# Patient Record
Sex: FEMALE | Race: BLACK OR AFRICAN AMERICAN | NOT HISPANIC OR LATINO | Employment: UNEMPLOYED | URBAN - METROPOLITAN AREA
[De-identification: names, ages, dates, MRNs, and addresses within clinical notes are randomized per-mention and may not be internally consistent; named-entity substitution may affect disease eponyms.]

---

## 2017-02-03 ENCOUNTER — TELEPHONE (OUTPATIENT)
Dept: OBSTETRICS AND GYNECOLOGY | Facility: CLINIC | Age: 27
End: 2017-02-03

## 2017-02-20 ENCOUNTER — TELEPHONE (OUTPATIENT)
Dept: OBSTETRICS AND GYNECOLOGY | Facility: CLINIC | Age: 27
End: 2017-02-20

## 2017-02-20 DIAGNOSIS — N91.2 AMENORRHEA: Primary | ICD-10-CM

## 2017-03-02 ENCOUNTER — TELEPHONE (OUTPATIENT)
Dept: OBSTETRICS AND GYNECOLOGY | Facility: CLINIC | Age: 27
End: 2017-03-02

## 2017-03-02 NOTE — TELEPHONE ENCOUNTER
----- Message from Mili Soto sent at 3/2/2017 12:17 PM CST -----  Contact: PAOLA HAWLEY [[id number]]  _x  1st Request  _  2nd Request  _  3rd Request        Who: PAOLA HAWLEY [[id number]]    Why: Pt states she would like to schedule her initial OB appointment. Pt states her LMP was around 12/25/16    What Number to Call Back: [phone]    When to Expect a call back: (Before the end of the day)   -- if call after 3:00 call back will be tomorrow.

## 2017-03-03 ENCOUNTER — TELEPHONE (OUTPATIENT)
Dept: OBSTETRICS AND GYNECOLOGY | Facility: CLINIC | Age: 27
End: 2017-03-03

## 2017-03-03 NOTE — TELEPHONE ENCOUNTER
She is already scheduled to see Linsey Silverman on 3/8. She was trying to get a sooner appt. She's going to keep her appt with Linsey Silverman.

## 2017-03-03 NOTE — TELEPHONE ENCOUNTER
----- Message from Mili Valera sent at 3/3/2017 12:47 PM CST -----  Contact: PAOLA HAWLEY [[id number]]  _x  1st Request  _  2nd Request  _  3rd Request        Who: PAOLA HAWLEY [[id number]]    Why: patient is returning a call    What Number to Call Back: [phone]    When to Expect a call back: (Before the end of the day)   -- if call after 3:00 call back will be tomorrow.

## 2017-03-08 ENCOUNTER — OFFICE VISIT (OUTPATIENT)
Dept: OBSTETRICS AND GYNECOLOGY | Facility: CLINIC | Age: 27
End: 2017-03-08
Payer: MEDICAID

## 2017-03-08 ENCOUNTER — HOSPITAL ENCOUNTER (EMERGENCY)
Facility: OTHER | Age: 27
Discharge: HOME | End: 2017-03-08
Attending: OBSTETRICS & GYNECOLOGY
Payer: MEDICAID

## 2017-03-08 VITALS
SYSTOLIC BLOOD PRESSURE: 120 MMHG | OXYGEN SATURATION: 97 % | TEMPERATURE: 99 F | DIASTOLIC BLOOD PRESSURE: 76 MMHG | HEART RATE: 92 BPM

## 2017-03-08 VITALS
SYSTOLIC BLOOD PRESSURE: 122 MMHG | HEIGHT: 62 IN | WEIGHT: 224.63 LBS | DIASTOLIC BLOOD PRESSURE: 78 MMHG | BODY MASS INDEX: 41.34 KG/M2

## 2017-03-08 DIAGNOSIS — R10.9 ABDOMINAL CRAMPING AFFECTING PREGNANCY, ANTEPARTUM: Primary | ICD-10-CM

## 2017-03-08 DIAGNOSIS — Z3A.18 18 WEEKS GESTATION OF PREGNANCY: ICD-10-CM

## 2017-03-08 DIAGNOSIS — N91.2 AMENORRHEA: Primary | ICD-10-CM

## 2017-03-08 DIAGNOSIS — M54.32 SCIATICA OF LEFT SIDE: ICD-10-CM

## 2017-03-08 DIAGNOSIS — O26.899 ABDOMINAL CRAMPING AFFECTING PREGNANCY, ANTEPARTUM: Primary | ICD-10-CM

## 2017-03-08 DIAGNOSIS — Z32.01 POSITIVE PREGNANCY TEST: ICD-10-CM

## 2017-03-08 DIAGNOSIS — O16.2 ELEVATED BLOOD PRESSURE COMPLICATING PREGNANCY, ANTEPARTUM, SECOND TRIMESTER: ICD-10-CM

## 2017-03-08 LAB
ALBUMIN SERPL BCP-MCNC: 3.1 G/DL
ALP SERPL-CCNC: 63 U/L
ALT SERPL W/O P-5'-P-CCNC: 8 U/L
ANION GAP SERPL CALC-SCNC: 8 MMOL/L
AST SERPL-CCNC: 10 U/L
B-HCG UR QL: POSITIVE
BACTERIA #/AREA URNS HPF: ABNORMAL /HPF
BILIRUB SERPL-MCNC: 0.2 MG/DL
BILIRUB SERPL-MCNC: NORMAL MG/DL
BILIRUB UR QL STRIP: NEGATIVE
BLOOD URINE, POC: NORMAL
BUN SERPL-MCNC: 6 MG/DL
CALCIUM SERPL-MCNC: 9.5 MG/DL
CHLORIDE SERPL-SCNC: 109 MMOL/L
CLARITY UR: CLEAR
CO2 SERPL-SCNC: 24 MMOL/L
COLOR UR: YELLOW
COLOR, POC UA: NORMAL
CREAT SERPL-MCNC: 0.6 MG/DL
CREAT UR-MCNC: 90.3 MG/DL
CTP QC/QA: YES
EST. GFR  (AFRICAN AMERICAN): >60 ML/MIN/1.73 M^2
EST. GFR  (NON AFRICAN AMERICAN): >60 ML/MIN/1.73 M^2
GLUCOSE SERPL-MCNC: 87 MG/DL
GLUCOSE UR QL STRIP: NEGATIVE
GLUCOSE UR QL STRIP: NORMAL
HGB UR QL STRIP: ABNORMAL
KETONES UR QL STRIP: NEGATIVE
KETONES UR QL STRIP: NORMAL
LDH SERPL L TO P-CCNC: 196 U/L
LEUKOCYTE ESTERASE UR QL STRIP: NEGATIVE
LEUKOCYTE ESTERASE URINE, POC: NORMAL
MICROSCOPIC COMMENT: ABNORMAL
NITRITE UR QL STRIP: NEGATIVE
NITRITE, POC UA: NORMAL
PH UR STRIP: 7 [PH] (ref 5–8)
PH, POC UA: NORMAL
POTASSIUM SERPL-SCNC: 3.4 MMOL/L
PROT SERPL-MCNC: 6.6 G/DL
PROT UR QL STRIP: NEGATIVE
PROT UR-MCNC: 12 MG/DL
PROT/CREAT RATIO, UR: 0.13
PROTEIN, POC: NORMAL
RBC #/AREA URNS HPF: 12 /HPF (ref 0–4)
SODIUM SERPL-SCNC: 141 MMOL/L
SP GR UR STRIP: 1.02 (ref 1–1.03)
SPECIFIC GRAVITY, POC UA: NORMAL
SQUAMOUS #/AREA URNS HPF: 5 /HPF
URN SPEC COLLECT METH UR: ABNORMAL
UROBILINOGEN UR STRIP-ACNC: 1 EU/DL
UROBILINOGEN, POC UA: NORMAL

## 2017-03-08 PROCEDURE — 81000 URINALYSIS NONAUTO W/SCOPE: CPT

## 2017-03-08 PROCEDURE — 80053 COMPREHEN METABOLIC PANEL: CPT

## 2017-03-08 PROCEDURE — 99283 EMERGENCY DEPT VISIT LOW MDM: CPT | Mod: ,,, | Performed by: OBSTETRICS & GYNECOLOGY

## 2017-03-08 PROCEDURE — 99284 EMERGENCY DEPT VISIT MOD MDM: CPT | Mod: 25,27

## 2017-03-08 PROCEDURE — 81002 URINALYSIS NONAUTO W/O SCOPE: CPT

## 2017-03-08 PROCEDURE — 99203 OFFICE O/P NEW LOW 30 MIN: CPT | Mod: S$PBB,TH,, | Performed by: NURSE PRACTITIONER

## 2017-03-08 PROCEDURE — 83615 LACTATE (LD) (LDH) ENZYME: CPT

## 2017-03-08 PROCEDURE — 84156 ASSAY OF PROTEIN URINE: CPT

## 2017-03-08 PROCEDURE — 99999 PR PBB SHADOW E&M-EST. PATIENT-LVL II: CPT | Mod: PBBFAC,,, | Performed by: NURSE PRACTITIONER

## 2017-03-08 NOTE — ED AVS SNAPSHOT
[facility]  [address]  [facility] [address]               Macie Guzman   3/8/2017  9:29 PM   ED    Description:  Female : 1990   Department:  [facility]           Your Care was Coordinated By:     Provider Role From To    Cara Platt DO Attending Provider [phone] --      Reason for Visit     Abdominal Pain     Back Pain           Diagnoses this Visit        Comments    Abdominal cramping affecting pregnancy, antepartum    -  Primary     Elevated blood pressure complicating pregnancy, antepartum, second trimester         18 weeks gestation of pregnancy           ED Disposition     ED Disposition Condition Comment    Discharge             To Do List           Follow-up Information     Follow up In 1 week.    Why:  primary OB - as scheduled      [facility]     [facility] Care [facility] -  Assistance  Registered nurses in the [facility] provide clinical advisement, health education, appointment booking, and other advisory services.  Call for this free service at [phone].             Medications           Message regarding Medications     Verify the changes and/or additions to your medication regime listed below are the same as discussed with your clinician today.  If any of these changes or additions are incorrect, please notify your healthcare provider.             Verify that the below list of medications is an accurate representation of the medications you are currently taking.  If none reported, the list may be blank. If incorrect, please contact your healthcare provider. Carry this list with you in case of emergency.           Current Medications            Clinical Reference Information           Your Vitals Were     BP Pulse Temp Last Period SpO2       120/76 92 98.8 °F (37.1 °C) 2016 (Approximate) 97%       Allergies as of 3/8/2017     No Known Allergies      Immunizations Administered on  Date of Encounter - 3/8/2017     None      ED Micro, Lab, POCT     Start Ordered       Status Ordering Provider    03/08/17 2217 03/08/17 2216  Urinalysis Clean Catch  STAT      Final result     03/08/17 2216 03/08/17 2216  Urinalysis Microscopic  Once      Final result     03/08/17 2154 03/08/17 2153  Lactate dehydrogenase  STAT      Final result     03/08/17 2154 03/08/17 2153  Protein / creatinine ratio, urine  Once      Final result     03/08/17 2154 03/08/17 2153  Comprehensive metabolic panel  STAT      Final result     03/08/17 2154 03/08/17 2154  POCT urinalysis, dipstick or tablet reag  Once      Final result       ED Imaging Orders     None        Discharge Instructions       Call clinic [phone] or L & D after hours at [phone] for vaginal bleeding, leakage of fluids, contractions 4-5 in one hour, decreased fetal movements ( 10 kicks in 2 hours), headache not relieved by Tylenol, blurry vision, or temp of 100.4 or greater.  Begin doing fetal kick counts, at least 10 movements in 2 hours starting at 28 weeks gestation.  Keep next clinic appointment      Your Scheduled Appointments     Apr 05, 2017  9:15 AM CDT   Routine Prenatal Visit with Brea Celestin MD   [facility] [address] [facility]    [address] [place] [id number] [phone]               [facility] complies with applicable Federal civil rights laws and does not discriminate on the basis of race, color, national origin, age, disability, or sex.        Language Assistance Services     ATTENTION: Language assistance services are available, free of charge. Please call [phone].      ATENCIÓN: Si habla español, tiene a mayo disposición servicios gratuitos de asistencia lingüística. Llame al [phone].     CHÚ Ý: N?u b?n nói Ti?ng Vi?t, có các d?ch v? h? tr? ngôn ng? mi?n phí dành cho b?n. G?i s? [phone].

## 2017-03-08 NOTE — PROGRESS NOTES
"  CC: Positive Pregnancy Test    HISTORY OF PRESENT ILLNESS:    Macie Guzman is a 26 y.o. female, ,  Presents today for a routine exam complaining of amenorrhea and positive home urine pregnancy test.  Patient's last menstrual period was 2016 (approximate).  Pt states she had the IUP confirmed at a women's clinic in [facility] with a NP that works with Dr. Paredes. Pt states they did an ultrasound, but told her the baby had "no organs". Pt states she was never given an ZAINAB or actually saw the doctor. Reports they did blood work and a pap which was normal. Pt is going to request her records from them. Transferring to [facility]-has seen Dr. Thurman in the past.  Reports + fetal movement. Denies pertinent medical history. Hx  x 2 ( and ) @ term with no complications per patient report.     Denies nausea. Reports breast tenderness. Denies pelvic pain. No vaginal bleeding.     LMP: 2016  EGA: 18w1d  EDC: 2017    ROS:  GENERAL: No weight changes. No swelling. No fatigue. No fever.  CARDIOVASCULAR: No chest pain. No shortness of breath. No leg cramps.   NEUROLOGICAL: No headaches. No vision changes.  BREASTS: No pain. No lumps. No discharge.  ABDOMEN: No pain. No diarrhea. No constipation.  REPRODUCTIVE: No abnormal bleeding.   VULVA: No pain. No lesions. No itching.  VAGINA: No relaxation. No itching. No odor. No discharge. No lesions.  URINARY: No incontinence. No nocturia. No frequency. No dysuria.    MEDICATIONS AND ALLERGIES:  Reviewed    COMPREHENSIVE GYN HISTORY:  PAP History: Denies abnormal Paps.  Infection History: Denies STDs. Denies PID.  Benign History: Denies uterine fibroids. Denies ovarian cysts. Denies endometriosis. Denies other conditions.  Cancer History: Denies cervical cancer. Denies uterine cancer or hyperplasia. Denies ovarian cancer. Denies vulvar cancer or pre-cancer. Denies vaginal cancer or pre-cancer. Denies breast cancer. Denies colon cancer.  Sexual Activity " "History: Reports currently being sexually active  Menstrual History: None.  Contraception: None    /78  Ht 5' 2" (1.575 m)  Wt 101.9 kg (224 lb 10.4 oz)  LMP 2016 (Approximate)  BMI 41.09 kg/m2    PE:  AFFECT: Calm, alert and oriented X 3. Interactive during exam  GENERAL: Appears well-nourished, well-developed, in no acute distress.  HEAD: Normocephalic, atruamatic  TEETH: Good dentition.  THYROID: No thyromegally   BREASTS: No masses, skin changes, nipple discharge or adenopathy bilaterally.  SKIN: Normal for race, warm, & dry. No lesions or rashes.  LUNGS: Easy and unlabored, clear to auscultation bilaterally.  HEART: Regular rate and rhythm   ABDOMEN: Soft and nontender without masses or organomegally.  VULVA: No lesions, masses or tenderness.  VAGINA: Moist and well rugated without lesions or discharge.  CERVIX: Moist and pink without lesions, discharge or tenderness.      UTERUS SIZE: 18 week size, nontender and without masses.  ADNEXA: No masses or tenderness.  ESTIMATE OF PELVIC CAPACITY: Adequate  EXTREMITIES: No cyanosis, clubbing or edema. No calf tenderness.  LYMPH NODES: No axillary or inguinal adenopathy.    PROCEDURES:  UPT Positive  Genprobe  Pap current    ASSESSMENT/PLAN:  Amenorrhea  Positive urine pregnancy test (ZAINAB: 2017, EGA: 18w1d based on LMP)    -  Routine prenatal care    Nausea and vomiting in pregnancy    -  Education regarding lifestyle and dietary modifications    -  Advised use of B6/Unisom. Pt will notify us if no relief/worsening symptoms, will consider Zofran if needed.    1st TRIMESTER COUNSELING: Discussed all, booklet provided:  Common complaints of pregnancy  HIV and other routine prenatal tests including  genetic screening  Risk factors identified by prenatal history  Oriented to practice - discussed anticipated course of prenatal care & indications for Ultrasound  Childbirth classes/Hospital facilities   Nutrition and weight gain " counseling  Toxoplasmosis precautions (Cats/Raw Meat)  Sexual activity and exercise  Environmental/Work hazards  Travel  Tobacco (Ask, Advise, Assess, Assist, and Arrange), as well as alcohol and drug use  Use of any medications (Including supplements, Vitamins, Herbs, or OTC Drugs)  Domestic violence  Seat belt use    TERATOLOGY COUNSELING:   Discussed indications and options for aneuploidy screening - pamphlets given    -  Pt declines testing    FOLLOW-UP in 4 weeks with Dr. Thurman  IOB labs today  Request records today  Anatomy/dating ultrasound ordered.   FHTs verified today with [id number]    Linsey Silverman NP  OB/GYN

## 2017-03-08 NOTE — MR AVS SNAPSHOT
"    [facility] [address]  [address]  [address]  [facility] [address]  Phone: [phone]  Fax: [phone]                  Macie Guzman   3/8/2017 11:00 AM   Office Visit    Description:  Female : 1990   Provider:  Linsey Silverman NP   Department:  [facility] [address]           Reason for Visit     Amenorrhea           Diagnoses this Visit        Comments    Amenorrhea    -  Primary     Positive pregnancy test                To Do List           Goals (5 Years of Data)     None      Follow-Up and Disposition     Return in about 4 weeks (around 2017) for OB FOLLOW-UP.      [facility]     [facility] On Call Nurse Care Line -  Assistance  Registered nurses in the [facility] Center provide clinical advisement, health education, appointment booking, and other advisory services.  Call for this free service at [phone].             Medications           Message regarding Medications     Verify the changes and/or additions to your medication regime listed below are the same as discussed with your clinician today.  If any of these changes or additions are incorrect, please notify your healthcare provider.        STOP taking these medications     naproxen (NAPROSYN) 500 MG tablet Take 1 tablet (500 mg total) by mouth every 8 (eight) hours.    oxycodone-acetaminophen (PERCOCET) 5-325 mg per tablet Take 1 tablet by mouth every 4 (four) hours as needed (pain 1-5).           Verify that the below list of medications is an accurate representation of the medications you are currently taking.  If none reported, the list may be blank. If incorrect, please contact your healthcare provider. Carry this list with you in case of emergency.           Current Medications            Clinical Reference Information           Your Vitals Were     BP Height Weight Last Period BMI    122/78 5' 2" (1.575 m) 101.9 kg (224 lb 10.4 oz) 2016 (Approximate) 41.09 kg/m2      Blood Pressure  "         Most Recent Value    BP  122/78      Allergies as of 3/8/2017     No Known Allergies      Immunizations Administered on Date of Encounter - 3/8/2017     None      Orders Placed During Today's Visit      Normal Orders This Visit    C. trachomatis/N. gonorrhoeae by AMP DNA Cervix     POCT urine pregnancy     Urine culture     Future Labs/Procedures Expected by Expires    Basic metabolic panel  3/8/2017 5/7/2018    CBC auto differential  3/8/2017 5/7/2018    Hepatitis B surface antigen  3/8/2017 5/7/2018    HIV-1 and HIV-2 antibodies  3/8/2017 5/7/2018    RPR  3/8/2017 5/7/2018    Rubella antibody, IgG  3/8/2017 5/7/2018    Type & Screen  3/8/2017 5/7/2018      Language Assistance Services     ATTENTION: Language assistance services are available, free of charge. Please call [phone].      ATENCIÓN: Si habla español, tiene a mayo disposición servicios gratuitos de asistencia lingüística. Llame al [phone].     CHÚ Ý: N?u b?n nói Ti?ng Vi?t, có các d?ch v? h? tr? ngôn ng? mi?n phí dành cho b?n. G?i s? [phone].         [facility] [address] complies with applicable Federal civil rights laws and does not discriminate on the basis of race, color, national origin, age, disability, or sex.

## 2017-03-09 NOTE — ED PROVIDER NOTES
Encounter Date: 3/8/2017       History     Chief Complaint   Patient presents with    Abdominal Pain    Back Pain     Review of patient's allergies indicates:  No Known Allergies  HPI Comments: Macie Guzman is a 26 y.o. [id number] at approximately 18 weeks presents complaining of back pain that radiates to her left leg. Patient reports driving approximately two hours prior to arriving to [facility] with sharp pain down her left leg. Patient denies one sided leg swelling, chest tightness or pain. Patient unsure if anything relieves or aggravates the pain. Patient denies dysuria, urinary urgency or frequency, denies fevers. Patient reports pain has diminished over the last two hours.   This IUP is complicated by late to pre  care.  Patient denies contractions, denies vaginal bleeding, denies LOF.   Fetal Movement: normal.      The history is provided by the patient.     No past medical history on file.  No past surgical history on file.  Family History   Problem Relation Age of Onset    Colon cancer Neg Hx     Eclampsia Neg Hx     Breast cancer Neg Hx     Ovarian cancer Neg Hx      Social History   Substance Use Topics    Smoking status: Never Smoker    Smokeless tobacco: Not on file    Alcohol use No     Review of Systems   Constitutional: Negative for chills, fatigue and fever.   Respiratory: Negative for cough, chest tightness and wheezing.    Cardiovascular: Negative for chest pain and leg swelling.   Gastrointestinal: Negative for abdominal pain.   Genitourinary: Negative for dysuria, pelvic pain, vaginal bleeding, vaginal discharge and vaginal pain.       Physical Exam   Initial Vitals   BP Pulse Resp Temp SpO2   17 2136 17 2136 -- 17 2136 17 2136   174/98 94  98.8 °F (37.1 °C) 100 %     Physical Exam    Constitutional: She appears well-developed and well-nourished. No distress.   HENT:   Head: Normocephalic and atraumatic.   Cardiovascular: Normal rate and regular rhythm.   No  "murmur heard.  Pulmonary/Chest: Breath sounds normal. No respiratory distress. She has no wheezes. She has no rales.   Abdominal: Soft. She exhibits distension (gravid).   Musculoskeletal:   Mild bilateral back pain. Negative CVA tenderness   Neurological: She is alert and oriented to person, place, and time.   Psychiatric: She has a normal mood and affect.     OB LABOR EXAM:                     Comments: FHT: Established with doppler 135bpm  SVE: cl/th/hi       ED Course   Procedures  Labs Reviewed   LACTATE DEHYDROGENASE   PROTEIN / CREATININE RATIO, URINE   COMPREHENSIVE METABOLIC PANEL   URINALYSIS   POCT URINALYSIS, DIPSTICK OR TABLET REAGENT, AUTOMATED, WITH MICROSCOP             Medical Decision Making:   Initial Assessment:   25 yo  at  ~18 weeks presents with left abdominal pain  Differential Diagnosis:   Normal discomforts of pregnancy vs nephrolithiasis  ED Management:  Back pain  - Normal discomforts of pregnancy. Patient pain relieved in triage and is comfortable without intervention  - No CVA tenderness  - 1+ blood in urine  - Patient counseled to return to [facility] if back pain returns, she becomes febrile, or has dysuria     Elevated pressures  - Transiently elevated pressures in [facility]. Resolved with multiple normal ranges  - CMP, CBC, P/C ratio wnl  - Denied HA, vision changes, difficulty breathing, chest pain, RUQ pain or new leg edema  - Follow up this week for BP checks with primary OB      IUP at 18 weeks  - Counseled patient to establish care with [facility] as patient desires to transfer from prior practice  - FHT established and wnl, cervix non laboring/closed  - Counseled to call MD for:  Excessive vaginal bleeding beyond spotting over next 48 hours  Frequent, painful contractions  Fluid loss ("gush of fluid")  Decreased fetal movement      Dispo:  Discharged home in stable condition              Attending Attestation:   Physician Attestation Statement for Resident:  As the supervising " MD   Physician Attestation Statement: I have personally seen and examined this patient.   I agree with the above history. -:   As the supervising MD I agree with the above PE.    As the supervising MD I agree with the above treatment, course, plan, and disposition.  I have reviewed and agree with the residents interpretation of the following: lab data.                    ED Course   Comment By Time   Macie is here after initial ob visit here at [facility].  She started having lower back pain radiating around abdomen.  On exam, BP's elevated initially.  BP series started, pre-e labs ordered.  FH 20 cm.  Cervix FT, thick and high. Will follow up results Cara Platt, DO 03/08 2226     Clinical Impression:   The primary encounter diagnosis was Abdominal cramping affecting pregnancy, antepartum. Diagnoses of Elevated blood pressure complicating pregnancy, antepartum, second trimester and 18 weeks gestation of pregnancy were also pertinent to this visit.          Brayan Peralta MD  Resident  03/09/17 0002       Cara Platt,   03/09/17 0102

## 2017-03-09 NOTE — ED NOTES
Pt discharged home with self care; discharge instructions reviewed, pt verbalized an understanding; will follow-up with primary OB

## 2017-03-09 NOTE — ED NOTES
Pt received in triage with c/o constant lower abdominal and back pain that started 1 hour prior to ED arrival; pt states that she is 24wks pregnant; denies LOF, VB; states that she has no prior history or complications during her other pregnancies; upon assessment elevated BPs noted; pt states that she is extremely hot and flush; pre-e labs sent, urine collected;

## 2017-03-09 NOTE — DISCHARGE INSTRUCTIONS
Call clinic [phone] or L & D after hours at [phone] for vaginal bleeding, leakage of fluids, contractions 4-5 in one hour, decreased fetal movements ( 10 kicks in 2 hours), headache not relieved by Tylenol, blurry vision, or temp of 100.4 or greater.  Begin doing fetal kick counts, at least 10 movements in 2 hours starting at 28 weeks gestation.  Keep next clinic appointment

## 2017-03-11 LAB — BACTERIA UR CULT: NORMAL

## 2017-03-13 ENCOUNTER — PATIENT MESSAGE (OUTPATIENT)
Dept: OBSTETRICS AND GYNECOLOGY | Facility: CLINIC | Age: 27
End: 2017-03-13

## 2017-03-27 ENCOUNTER — OFFICE VISIT (OUTPATIENT)
Dept: MATERNAL FETAL MEDICINE | Facility: CLINIC | Age: 27
End: 2017-03-27
Payer: MEDICAID

## 2017-03-27 ENCOUNTER — APPOINTMENT (OUTPATIENT)
Dept: LAB | Facility: OTHER | Age: 27
End: 2017-03-27
Attending: OBSTETRICS & GYNECOLOGY
Payer: MEDICAID

## 2017-03-27 DIAGNOSIS — Z32.01 POSITIVE PREGNANCY TEST: ICD-10-CM

## 2017-03-27 DIAGNOSIS — O35.03X0 CHOROID PLEXUS CYST OF FETUS AFFECTING MANAGEMENT OF MOTHER IN SINGLETON PREGNANCY, ANTEPARTUM: ICD-10-CM

## 2017-03-27 DIAGNOSIS — N91.2 AMENORRHEA: ICD-10-CM

## 2017-03-27 DIAGNOSIS — O35.DXX0 ECHOGENIC FOCUS OF BOWEL OF FETUS AFFECTING MANAGEMENT OF MOTHER IN SINGLETON PREGNANCY, ANTEPARTUM: ICD-10-CM

## 2017-03-27 PROCEDURE — 99215 OFFICE O/P EST HI 40 MIN: CPT | Mod: S$PBB,TH,, | Performed by: PEDIATRICS

## 2017-03-27 PROCEDURE — 76811 OB US DETAILED SNGL FETUS: CPT | Mod: 26,S$PBB,, | Performed by: PEDIATRICS

## 2017-03-27 NOTE — PROGRESS NOTES
Pt given [id number] paperwork give to pt along with instructions to get to the lab. Pt verbalized understanding.

## 2017-03-28 ENCOUNTER — PATIENT MESSAGE (OUTPATIENT)
Dept: OBSTETRICS AND GYNECOLOGY | Facility: CLINIC | Age: 27
End: 2017-03-28

## 2017-03-28 PROBLEM — O35.DXX0 ECHOGENIC FOCUS OF BOWEL OF FETUS AFFECTING MANAGEMENT OF MOTHER IN SINGLETON PREGNANCY, ANTEPARTUM: Status: ACTIVE | Noted: 2017-03-28

## 2017-03-28 PROBLEM — O35.03X0 CHOROID PLEXUS CYST OF FETUS AFFECTING MANAGEMENT OF MOTHER IN SINGLETON PREGNANCY, ANTEPARTUM: Status: ACTIVE | Noted: 2017-03-28

## 2017-03-28 NOTE — PROGRESS NOTES
Indication  ========    Fetal anatomy survey.    Pregnancy History  ==============    Maternal Lab Tests  Genetic screening declined.      Method  ======    Transabdominal ultrasound examination. View: Good view.    Pregnancy  =========    Lewis pregnancy. Number of fetuses: 1.    Dating  ======    Cycle: regular cycle  Ultrasound examination on: 3/27/2017  GA by U/S based upon: AC, BPD, Femur, HC  GA by U/S 25 w + 5 d  ZAINAB by U/S: 7/5/2017  Assigned: Dating performed on 03/27/2017, based on ultrasound (AC, BPD, Femur, HC)  Assigned GA 25 w + 5 d  Assigned ZAINAB: 7/5/2017    General Evaluation  ==============    Cardiac activity: present.  bpm.  Fetal movements: visualized.  Presentation: breech.  Placenta: posterior, fundal.  Umbilical cord: 2 vessel cord.  Amniotic fluid: normal amount.    Fetal Biometry  ============    Fetal Biometry  BPD 61.7 mm 20% 25w 0d Hadlock  OFD 89.7 mm >99% 28w 6d Pippa  .1 mm 62% 26w 5d Hadlock  .0 mm 41% 25w 5d Hadlock  Femur 45.9 mm 22% 25w 2d Hadlock  Cerebellum tr 27.1 mm 31% 25w 2d Robertson  CM 5.4 mm 23% Nicolaides  Humerus 42.9 mm 41% 25w 5d Pippa   g 34% 25w 2d Hadlock  Calculated by: Hadlock (BPD-HC-AC-FL)  EFW (lb) 1 lb  EFW (oz) 13 oz  Cephalic index 0.69 <1% Nicolaides  HC / AC 1.16  FL / BPD 0.74  FL / AC 0.22   bpm  Head / Face / Neck   3.3 mm    Fetal Anatomy  ===========    Cranium: normal  Lateral ventricles: normal  Choroid plexus: normal  Midline falx: normal  Cavum septi pellucidi: normal  Cerebellum: normal  Cisterna magna: normal  Neck: appears normal  Nuchal fold: normal  Lips: normal  Profile: normal  Nose: normal  4-chamber view: normal  RVOT: normal  LVOT: normal  Situs: normal  Aortic arch: normal  Ductal arch: normal  SVC: normal  IVC: normal  3-vessel view: normal  3-vessel-trachea view: normal  Cardiac axis: normal  Cardiac size: normal  Cardiac rhythm: normal  Rt lung: normal  Lt  lung: normal  Diaphragm: normal  Cord insertion: normal  Stomach: normal  Kidneys: normal  Bladder: normal  Genitals: normal  Abdom. wall: normal  Rt kidney: normal  Lt kidney: normal  Liver: normal  Small bowel: echogenic bowel  Rt renal artery: normal  Lt renal artery: normal  Cervical spine: normal  Thoracic spine: normal  Lumbar spine: normal  Sacral spine: normal  Rt hand: normal  Lt hand: normal  Rt foot: normal  Lt foot: normal  Gender: male  Wants to know gender: yes  Other: Right hand closed. Left hand closed    Maternal Structures  ===============    Uterus / Cervix  Uterus: Normal  Cervical length 69.4 mm  Ovaries / Tubes / Adnexa  Rt ovary: Visualized  Lt ovary: Visualized  Pouch of Sammy / Other Structures  Cul de Sac: Visualized    Consultation  ==========    Type: Abnormal Ultrasound Finding.  Referring MD: Dr. Celestin    The finding of isolated choroid plexus cyst(s) was reviewed with the patient. Choroid plexus cysts are seen in approximately 1 - 3% of normal  fetuses and have no functional or clinical significance. They are also seen in approximately 50% of fetuses with trisomy 18; therefore, their  primary significance on prenatal sonography is as a potential marker for trisomy 18. Risk assessment for trisomy 18 includes maternal age,  other screening tests for trisomy 18, and the presence of other sonographic abnormalities. At maternal age 25, the risk to have a child born  with trisomy 18 is approximately 1 in 3020.    I counselled the patient, as the additional minor marker, echogenic bowel, was noted.    Echogenic bowel can be a normal variant or it can be associated with chromosome abnormalities, congenital infections, cystic fibrosis,  intestinal malformation or bleeding during pregnancy. When seen in conjuction with an elevated msAFP, echogenic bowel has been associated  with poor pregnancy outcome such as IUGR, fetal demise and stillbirth. Liver calcifications can also be associated with  congenital infections.    Various criteria for diagnosis of bowel echogenicity have been suggested. The simplest criteria is echogenicity similar to or greater than that of  adjacent bone with the ultrasound gain set to the lowest point at which bone appears white. The iliac wing is the usual standard for  comparison. Some authors have defined grades of echogenicity, with the most severe form (grade 3) being as bright as bone.    The chromosome abnormality most commonly associated with echogenic bowel is trisomy 21. The infections most commonly associated with  echogenic bowel are cytomegalovirus (CMV), parvovirus, and toxoplasmosis. MsCristiana [ ] stated that she had/had not been around cats during the  pregnancy.    With regard to cystic fibrosis, the carrier rate in the  population is approximately 1 in 65. This makes the empiric risk for two  people of  origin to have a child with cystic fibrosis at 1 in 16,900.    Bleeding into the amniotic fluid might account for echogenic bowel if swallowed by the fetus.    We discussed cffDNA. She has accepted testing. No other sonographic abnormalities were noted on the detailed ultrasound study performed  today. The risk for Trisomy 18 in this pregnancy is very low. Nevertheless, the option of genetic amniocentesis for definitive cytogenetic  diagnosis was reviewed, including the risks and benefits of the procedure. The patient declined amniocentesis.    Impression  =========    Normal fetal anatomy with 2 minor markers for aneuploidy, a CPC and Gr 2 echogenic bowel. This completes the anatomic survey.    Dating done by this ultrasound.    Normal amniotic fluid volume per qualitative assessment.    Normal placental location without evidence of previa.  Normal appearing cervical length per trans-abdominal screening.      I spent 25 minutes in direct consultation and care management.    Recommendation  ==============    Suggest repeat scan in 4 weeks  for growth and follow-up.  Await [id number] results.    Thank you for allowing us to participate in the care of your patients. If you have any questions concerning today's consultation feel free to  contact me or one of my partners. We can be reached at [phone] during normal business hours. If you have a question after normal  business hours, please contact Labor and Delivery [phone] and the unit secretary will page our on call physician.

## 2017-03-30 ENCOUNTER — TELEPHONE (OUTPATIENT)
Dept: OBSTETRICS AND GYNECOLOGY | Facility: CLINIC | Age: 27
End: 2017-03-30

## 2017-03-30 NOTE — TELEPHONE ENCOUNTER
Contacted pt regarding concerns about her ultrasound report from [facility]. Discussed CPC, 2 vessel cord,and echogenic bowel findings. Pt verbalized understanding. MT21 test results are still pending.

## 2017-03-30 NOTE — TELEPHONE ENCOUNTER
----- Message from Tiffanie Hoover LPN sent at 3/29/2017  4:22 PM CDT -----  Ok Thanks! When will I  receive the results from the lab? I'm just very scared sorry to keep bothering you .

## 2017-04-03 ENCOUNTER — TELEPHONE (OUTPATIENT)
Dept: MATERNAL FETAL MEDICINE | Facility: CLINIC | Age: 27
End: 2017-04-03

## 2017-04-03 NOTE — TELEPHONE ENCOUNTER
Patient has been notified of NEGATIVE [id number] results. This specimen showed an expected representation of chromosomes 13, 18 and 21 material consistent with a MALE fetus.    Pt verbalized understanding of information.

## 2017-04-10 ENCOUNTER — PATIENT MESSAGE (OUTPATIENT)
Dept: OBSTETRICS AND GYNECOLOGY | Facility: CLINIC | Age: 27
End: 2017-04-10

## 2017-04-13 ENCOUNTER — ROUTINE PRENATAL (OUTPATIENT)
Dept: OBSTETRICS AND GYNECOLOGY | Facility: CLINIC | Age: 27
End: 2017-04-13
Attending: OBSTETRICS & GYNECOLOGY
Payer: MEDICAID

## 2017-04-13 VITALS
BODY MASS INDEX: 41.21 KG/M2 | SYSTOLIC BLOOD PRESSURE: 120 MMHG | DIASTOLIC BLOOD PRESSURE: 70 MMHG | WEIGHT: 225.31 LBS

## 2017-04-13 DIAGNOSIS — R10.9 CRAMPING AFFECTING PREGNANCY, ANTEPARTUM: ICD-10-CM

## 2017-04-13 DIAGNOSIS — O26.899 CRAMPING AFFECTING PREGNANCY, ANTEPARTUM: ICD-10-CM

## 2017-04-13 DIAGNOSIS — O09.30 LATE PRENATAL CARE: ICD-10-CM

## 2017-04-13 DIAGNOSIS — Z34.93 PREGNANCY WITH ONE FETUS, THIRD TRIMESTER: Primary | ICD-10-CM

## 2017-04-13 DIAGNOSIS — Z87.59 HISTORY OF GESTATIONAL HYPERTENSION: ICD-10-CM

## 2017-04-13 PROBLEM — Z34.90 PREGNANCY WITH ONE FETUS: Status: ACTIVE | Noted: 2017-04-13

## 2017-04-13 PROCEDURE — 99213 OFFICE O/P EST LOW 20 MIN: CPT | Mod: TH,S$PBB,, | Performed by: ADVANCED PRACTICE MIDWIFE

## 2017-04-13 PROCEDURE — 99999 PR PBB SHADOW E&M-EST. PATIENT-LVL II: CPT | Mod: PBBFAC,,, | Performed by: ADVANCED PRACTICE MIDWIFE

## 2017-04-13 PROCEDURE — 99212 OFFICE O/P EST SF 10 MIN: CPT | Mod: PBBFAC | Performed by: ADVANCED PRACTICE MIDWIFE

## 2017-04-13 NOTE — PROGRESS NOTES
Pt uncomfortable today - reports back pain and lower abdominal cramping.  Believes she lost her mucous plug three days ago and has become increasingly uncomfortable since that time. Reports cramping throughout the day today several times an hour.  Appears agitated during visit - moving around room and appears she cannot get comfortable with frequent position changes.  Reports good FM, no LOF or VB. Reports has been drinking good amount of water today and doesn't feel she's overexerted herself.  Advised F/U on L&D for further evaluation, r/o PTL.  Reviewed OB Glucola & CBC next week.  Limited prenatal care to this point - discussed importance of routine prenatal care with regular visits.    Reviewed [facility] ultrasound - CPC & Echogenic bowel noted. MaterniT 21 negative. Repeat sono with [facility] scheduled.  RTC 2 wks.

## 2017-04-17 ENCOUNTER — TELEPHONE (OUTPATIENT)
Dept: OBSTETRICS AND GYNECOLOGY | Facility: CLINIC | Age: 27
End: 2017-04-17

## 2017-04-17 NOTE — TELEPHONE ENCOUNTER
----- Message from Karyn Garza CNM sent at 4/13/2017  5:04 PM CDT -----  Please schedule for routine visit with Dr. Celestin in 2 weeks.

## 2017-04-26 ENCOUNTER — OFFICE VISIT (OUTPATIENT)
Dept: MATERNAL FETAL MEDICINE | Facility: CLINIC | Age: 27
End: 2017-04-26
Payer: MEDICAID

## 2017-04-26 ENCOUNTER — LAB VISIT (OUTPATIENT)
Dept: LAB | Facility: OTHER | Age: 27
End: 2017-04-26
Attending: OBSTETRICS & GYNECOLOGY
Payer: MEDICAID

## 2017-04-26 ENCOUNTER — ROUTINE PRENATAL (OUTPATIENT)
Dept: OBSTETRICS AND GYNECOLOGY | Facility: CLINIC | Age: 27
End: 2017-04-26
Attending: OBSTETRICS & GYNECOLOGY
Payer: MEDICAID

## 2017-04-26 VITALS — DIASTOLIC BLOOD PRESSURE: 80 MMHG | WEIGHT: 226 LBS | SYSTOLIC BLOOD PRESSURE: 130 MMHG | BODY MASS INDEX: 41.33 KG/M2

## 2017-04-26 VITALS — DIASTOLIC BLOOD PRESSURE: 84 MMHG | SYSTOLIC BLOOD PRESSURE: 132 MMHG

## 2017-04-26 DIAGNOSIS — E66.01 MORBID OBESITY WITH BMI OF 40.0-44.9, ADULT: ICD-10-CM

## 2017-04-26 DIAGNOSIS — Z36.89 ENCOUNTER FOR ULTRASOUND TO CHECK FETAL GROWTH: Primary | ICD-10-CM

## 2017-04-26 DIAGNOSIS — Z34.93 PREGNANCY WITH ONE FETUS, THIRD TRIMESTER: ICD-10-CM

## 2017-04-26 DIAGNOSIS — Z34.93 PREGNANCY WITH ONE FETUS, THIRD TRIMESTER: Primary | ICD-10-CM

## 2017-04-26 DIAGNOSIS — N91.2 AMENORRHEA: ICD-10-CM

## 2017-04-26 LAB
BASOPHILS # BLD AUTO: 0.01 K/UL
BASOPHILS NFR BLD: 0.1 %
DIFFERENTIAL METHOD: ABNORMAL
EOSINOPHIL # BLD AUTO: 0.1 K/UL
EOSINOPHIL NFR BLD: 0.6 %
ERYTHROCYTE [DISTWIDTH] IN BLOOD BY AUTOMATED COUNT: 13 %
GLUCOSE SERPL-MCNC: 126 MG/DL
HCT VFR BLD AUTO: 35.4 %
HGB BLD-MCNC: 11.9 G/DL
LYMPHOCYTES # BLD AUTO: 1.9 K/UL
LYMPHOCYTES NFR BLD: 14.9 %
MCH RBC QN AUTO: 28.7 PG
MCHC RBC AUTO-ENTMCNC: 33.6 %
MCV RBC AUTO: 86 FL
MONOCYTES # BLD AUTO: 0.5 K/UL
MONOCYTES NFR BLD: 3.8 %
NEUTROPHILS # BLD AUTO: 10.3 K/UL
NEUTROPHILS NFR BLD: 79.3 %
PLATELET # BLD AUTO: 227 K/UL
PMV BLD AUTO: 10.8 FL
RBC # BLD AUTO: 4.14 M/UL
WBC # BLD AUTO: 12.98 K/UL

## 2017-04-26 PROCEDURE — 76816 OB US FOLLOW-UP PER FETUS: CPT | Mod: 26,S$PBB,, | Performed by: PEDIATRICS

## 2017-04-26 PROCEDURE — 99999 PR PBB SHADOW E&M-EST. PATIENT-LVL II: CPT | Mod: PBBFAC,,, | Performed by: OBSTETRICS & GYNECOLOGY

## 2017-04-26 PROCEDURE — 82950 GLUCOSE TEST: CPT

## 2017-04-26 PROCEDURE — 83020 HEMOGLOBIN ELECTROPHORESIS: CPT

## 2017-04-26 PROCEDURE — 99213 OFFICE O/P EST LOW 20 MIN: CPT | Mod: TH,S$PBB,, | Performed by: OBSTETRICS & GYNECOLOGY

## 2017-04-26 PROCEDURE — 85025 COMPLETE CBC W/AUTO DIFF WBC: CPT

## 2017-04-26 PROCEDURE — 76819 FETAL BIOPHYS PROFIL W/O NST: CPT | Mod: 26,S$PBB,, | Performed by: PEDIATRICS

## 2017-04-26 PROCEDURE — 36415 COLL VENOUS BLD VENIPUNCTURE: CPT

## 2017-04-26 PROCEDURE — 99999 PR PBB SHADOW E&M-EST. PATIENT-LVL I: CPT | Mod: PBBFAC,,,

## 2017-04-26 PROCEDURE — 99499 UNLISTED E&M SERVICE: CPT | Mod: S$PBB,,, | Performed by: PEDIATRICS

## 2017-04-26 NOTE — PROGRESS NOTES
Complaints today: ctx  Good fm.  Denies vb, lof.  More than one ctx an hour    /80  Wt 102.5 kg (225 lb 15.5 oz)  LMP 2016  BMI 41.33 kg/m2    26 y.o., at 30w0d by Estimated Date of Delivery: 17  Patient Active Problem List   Diagnosis    Choroid plexus cyst of fetus affecting management of mother in cabrera pregnancy, antepartum    Echogenic focus of bowel of fetus affecting management of mother in cabrera pregnancy, antepartum    Late prenatal care    Pregnancy with one fetus    History of gestational hypertension - prior pregnancy     OB History    Para Term  AB SAB TAB Ectopic Multiple Living   3 2 2      0 2      # Outcome Date GA Lbr Erick/2nd Weight Sex Delivery Anes PTL Lv   3 Current            2 Term 14 39w0d  2.892 kg (6 lb 6 oz) F Vag-Spont EPI N Y      Complications: Gestational (pregnancy-induced) hypertension without significant proteinuria, first trimester   1 Term  37w0d  3.345 kg (7 lb 6 oz) F Vag-Spont  N Y      Obstetric Comments   Pt reports having 2 living children. Obstetric history initially had 4 NSVDs- updated on 3/8/2017 per patient report.        Dating reviewed    Allergies and problem list reviewed and updated    Medical and surgical history reviewed    Prenatal labs reviewed and updated    Physical Exam:  ABD: soft, gravid, nontender,     Assessment:  Macie was seen today for routine prenatal visit and dizziness.    Diagnoses and all orders for this visit:    Pregnancy with one fetus, third trimester  -     Hemoglobin Electrophoresis,Hgb A2 Remi.; Future  -     CBC auto differential; Future  -     OB Glucose Screen; Future         Plan:   PTL precautions.  follow up labs   follow up 2Weeks, kick counts, labor precautions

## 2017-04-26 NOTE — MR AVS SNAPSHOT
[facility] [address]  [address]  [address]  [place] [address]  Phone: [phone]  Fax: [phone]                  Macie Guzman   2017 3:00 PM   Routine Prenatal    Description:  Female : 1990   Provider:  Brea Celestin MD   Department:  [facility] [address]           Reason for Visit     Routine Prenatal Visit     Dizziness           Diagnoses this Visit        Comments    Pregnancy with one fetus, third trimester    -  Primary            To Do List           Goals (5 Years of Data)     None      Follow-Up and Disposition     Return in about 2 weeks (around 5/10/2017) for st.      [facility] On Call     [facility] On Call Nurse Care Line -  Assistance  Unless otherwise directed by your provider, please contact [facility], our nurse care line that is available for  assistance.     Registered nurses in the [facility] On Call Center provide: appointment scheduling, clinical advisement, health education, and other advisory services.  Call: [phone] (toll free)               Medications           Message regarding Medications     Verify the changes and/or additions to your medication regime listed below are the same as discussed with your clinician today.  If any of these changes or additions are incorrect, please notify your healthcare provider.             Verify that the below list of medications is an accurate representation of the medications you are currently taking.  If none reported, the list may be blank. If incorrect, please contact your healthcare provider. Carry this list with you in case of emergency.                Clinical Reference Information           Prenatal Vitals     Enc. Date GA Prenatal Vitals Prenatal Pulse Pain Level Urine Albumin/Glucose Edema Presentation Dilation/Effacement/Station    17 30w0d 132/84           17 30w0d 130/80 / 102.5 kg (225 lb 15.5 oz)  /  / Present  7 Trace / Negative       17 28w1d 120/70 / 102.2 kg  (225 lb 5 oz) 31 cm / 150 / Present  0 Trace / Negative         Your Vitals Were     BP Weight Last Period BMI       130/80 102.5 kg (225 lb 15.5 oz) 09/01/2016 41.33 kg/m2       Allergies as of 4/26/2017     No Known Allergies      Immunizations Administered on Date of Encounter - 4/26/2017     None      Orders Placed During Today's Visit     Future Labs/Procedures Expected by Expires    CBC auto differential  4/26/2017 4/26/2018    Hemoglobin Electrophoresis,Hgb A2 Remi.  4/26/2017 6/25/2018    OB Glucose Screen  4/26/2017 6/25/2018      Language Assistance Services     ATTENTION: Language assistance services are available, free of charge. Please call [phone].      ATENCIÓN: Si walkerla nacho, tiene a mayo disposición servicios gratuitos de asistencia lingüística. Llame al [phone].     CHÚ Ý: N?u b?n nói Ti?ng Vi?t, có các d?ch v? h? tr? ngôn ng? mi?n phí dành cho b?n. G?i s? [phone].         [facility] [address] complies with applicable Federal civil rights laws and does not discriminate on the basis of race, color, national origin, age, disability, or sex.

## 2017-04-28 ENCOUNTER — PATIENT MESSAGE (OUTPATIENT)
Dept: OBSTETRICS AND GYNECOLOGY | Facility: CLINIC | Age: 27
End: 2017-04-28

## 2017-04-28 PROBLEM — E66.01 MORBID OBESITY WITH BMI OF 40.0-44.9, ADULT: Status: ACTIVE | Noted: 2017-04-28

## 2017-04-28 NOTE — PROGRESS NOTES
Indication  ========    Evaluation of fetal growth, 2VC, echogenic bowel.    Pregnancy History  ==============    Maternal Lab Tests  Test: Cell Free DNA Testing  Result: Materni T21 negative  Wants to know gender: yes    Method  ======    Transabdominal ultrasound examination. View: Suboptimal view: limited by fetal position.    Pregnancy  =========    Lewis pregnancy. Number of fetuses: 1.    Dating  ======    Cycle: regular cycle  Ultrasound examination on: 4/26/2017  GA by U/S based upon: AC, BPD, Femur, HC  GA by U/S 30 w + 2 d  ZAINAB by U/S: 7/3/2017  Assigned: Dating performed on 03/27/2017, based on ultrasound (AC, BPD, Femur, HC)  Assigned GA 30 w + 0 d  Assigned ZAINAB: 7/5/2017    General Evaluation  ==============    Cardiac activity:  bpm.  Placenta: anterior, fundal.  Umbilical cord: 2 vessel cord.  Amniotic fluid: MVP 5.9 cm.    Biophysical Profile  ==============    2: Fetal breathing movements  2: Gross body movements  2: Fetal tone  2: Amniotic fluid volume  8/8: Biophysical profile score  Interpretation: normal    Fetal Biometry  ============    Fetal Biometry  BPD 76.5 mm 60% 30w 5d Hadlock  OFD 99.5 mm 94% 32w 1d Pippa  .9 mm 56% 31w 3d Hadlock  .6 mm 29% 29w 3d Hadlock  Femur 55.7 mm 18% 29w 2d Hadlock  EFW 1,437 g 28% 29w 2d Hadlock  Calculated by: Hadlock (BPD-HC-AC-FL)  EFW (lb) 3 lb  EFW (oz) 3 oz  Cephalic index 0.77 25% Nicolaides  HC / AC 1.13  FL / BPD 0.73  FL / AC 0.22  MVP 5.9 cm   bpm  Head / Face / Neck   8.6 mm    Fetal Anatomy  ============    Cranium: normal  Lateral ventricles: normal  Choroid plexus: normal  Midline falx: normal  Cavum septi pellucidi: documented previously  Cerebellum: normal  Cisterna magna: normal  Lips: normal  Profile: normal  Nose: normal  4-chamber view: normal  Cord insertion: normal  Stomach: normal  Kidneys: normal  Bladder: normal  Arms: both visualized  Legs: both visualized  Gender: male  Wants to know  gender: yes  Other: Right hand closed. Left hand closed        Impression  =========    Seen in consult earlier for 2 VC, echogenic bowel. [id number] screen negative.    Limited anatomy was negative. No anomalies seen.  AFV normal.  Fetal biometry is consistent and concordant with dating.        Recommendation  ==============    Repeat growth in 6 weeks with BPP and weekly BPP following (BMII>40.).    Thank you for allowing us to participate in the care of your patients. If you have any questions concerning today's consultation feel free to  contact me or one of my partners. We can be reached at [phone] during normal business hours. If you have a question after normal  business hours, please contact Labor and Delivery [phone] and the unit secretary will page our on call physician.

## 2017-05-01 LAB
HGB A2 MFR BLD HPLC: 2.9 %
HGB FRACT BLD ELPH-IMP: NORMAL
HGB FRACT BLD ELPH-IMP: NORMAL

## 2017-05-09 ENCOUNTER — ROUTINE PRENATAL (OUTPATIENT)
Dept: OBSTETRICS AND GYNECOLOGY | Facility: CLINIC | Age: 27
End: 2017-05-09
Payer: MEDICAID

## 2017-05-09 ENCOUNTER — PATIENT MESSAGE (OUTPATIENT)
Dept: OBSTETRICS AND GYNECOLOGY | Facility: CLINIC | Age: 27
End: 2017-05-09

## 2017-05-09 VITALS
SYSTOLIC BLOOD PRESSURE: 122 MMHG | WEIGHT: 225.75 LBS | DIASTOLIC BLOOD PRESSURE: 70 MMHG | BODY MASS INDEX: 41.29 KG/M2

## 2017-05-09 DIAGNOSIS — Z34.93 PREGNANCY WITH ONE FETUS, THIRD TRIMESTER: Primary | ICD-10-CM

## 2017-05-09 PROCEDURE — 99999 PR PBB SHADOW E&M-EST. PATIENT-LVL II: CPT | Mod: PBBFAC,,, | Performed by: OBSTETRICS & GYNECOLOGY

## 2017-05-09 PROCEDURE — 99212 OFFICE O/P EST SF 10 MIN: CPT | Mod: PBBFAC,PO | Performed by: OBSTETRICS & GYNECOLOGY

## 2017-05-09 PROCEDURE — 99213 OFFICE O/P EST LOW 20 MIN: CPT | Mod: TH,S$PBB,, | Performed by: OBSTETRICS & GYNECOLOGY

## 2017-05-09 NOTE — MR AVS SNAPSHOT
[facility]  [address] [address]  Phone: [phone]                  Macie Guzman   2017 3:00 PM   Routine Prenatal    Description:  Female : 1990   Provider:  Brea Celestin MD   Department:  [facility]           Reason for Visit     Routine Prenatal Visit     Abdominal Pain           Diagnoses this Visit        Comments    Pregnancy with one fetus, third trimester    -  Primary            To Do List           Future Appointments        Provider Department Dept Phone    2017 3:00 PM Brea Celestin MD [facility] OB/ -[phone]      Goals (5 Years of Data)     None      Follow-Up and Disposition     Return in about 2 weeks (around 2017).      [facility] On Call     [facility] On Call Nurse Care Line -  Assistance  Unless otherwise directed by your provider, please contact [facility], our nurse care line that is available for  assistance.     Registered nurses in the [facility] On Call Center provide: appointment scheduling, clinical advisement, health education, and other advisory services.  Call: [phone] (toll free)               Medications           Message regarding Medications     Verify the changes and/or additions to your medication regime listed below are the same as discussed with your clinician today.  If any of these changes or additions are incorrect, please notify your healthcare provider.             Verify that the below list of medications is an accurate representation of the medications you are currently taking.  If none reported, the list may be blank. If incorrect, please contact your healthcare provider. Carry this list with you in case of emergency.                Clinical Reference Information           Prenatal Vitals     Enc. Date GA Prenatal Vitals Prenatal Pulse Pain Level Urine Albumin/Glucose Edema Presentation Dilation/Effacement/Station    17 31w6d 122/70 / 102.4 kg (225 lb 12 oz) 32 cm /  165 / Present  0 Trace / Negative None / None      4/26/17 30w0d 132/84           4/26/17 30w0d 130/80 / 102.5 kg (225 lb 15.5 oz) 31 cm / + / Present  7 Trace / Negative None / None  0 / 0 / -5    4/13/17 28w1d 120/70 / 102.2 kg (225 lb 5 oz) 31 cm / 150 / Present  0 Trace / Negative         Your Vitals Were     BP Weight Last Period BMI       122/70 102.4 kg (225 lb 12 oz) 09/01/2016 41.29 kg/m2       Allergies as of 5/9/2017     No Known Allergies      Immunizations Administered on Date of Encounter - 5/9/2017     None      Language Assistance Services     ATTENTION: Language assistance services are available, free of charge. Please call [phone].      ATENCIÓN: Si walkerla nacho, tiene a mayo disposición servicios gratuitos de asistencia lingüística. Llame al [phone].     CHÚ Ý: N?u b?n nói Ti?ng Vi?t, có các d?ch v? h? tr? ngôn ng? mi?n phí dành cho b?n. G?i s? [phone].         [facility] complies with applicable Federal civil rights laws and does not discriminate on the basis of race, color, national origin, age, disability, or sex.

## 2017-05-09 NOTE — PROGRESS NOTES
Complaints today: none, doing well. Few rare contractions, good FM, no vaginal bleeding, no LOF    /70  Wt 102.4 kg (225 lb 12 oz)  LMP 2016  BMI 41.29 kg/m2    26 y.o., at 31w6d by Estimated Date of Delivery: 17  Patient Active Problem List   Diagnosis    Choroid plexus cyst of fetus affecting management of mother in cabrera pregnancy, antepartum    Echogenic focus of bowel of fetus affecting management of mother in cabrera pregnancy, antepartum    Late prenatal care    Pregnancy with one fetus/bottle/btl papers signed 17    History of gestational hypertension - prior pregnancy    Morbid obesity with BMI of 40.0-44.9, adult     OB History    Para Term  AB SAB TAB Ectopic Multiple Living   3 2 2      0 2      # Outcome Date GA Lbr Erick/2nd Weight Sex Delivery Anes PTL Lv   3 Current            2 Term 14 39w0d  2.892 kg (6 lb 6 oz) F Vag-Spont EPI N Y      Complications: Gestational (pregnancy-induced) hypertension without significant proteinuria, first trimester   1 Term  37w0d  3.345 kg (7 lb 6 oz) F Vag-Spont  N Y      Obstetric Comments   Pt reports having 2 living children. Obstetric history initially had 4 NSVDs- updated on 3/8/2017 per patient report.        Dating reviewed  Allergies and problem list reviewed and updated  Medical and surgical history reviewed  Prenatal labs reviewed and updated    Physical Exam:  ABD: soft, gravid, nontender, size appropriate for date  FHT verified    Assessment:  IUP @ 31.6    Plan:   RTC 2 weeks  Kick counts, labor precautions, JUSTINE discussed

## 2017-05-17 ENCOUNTER — PATIENT MESSAGE (OUTPATIENT)
Dept: OBSTETRICS AND GYNECOLOGY | Facility: CLINIC | Age: 27
End: 2017-05-17

## 2017-05-24 ENCOUNTER — PATIENT MESSAGE (OUTPATIENT)
Dept: OBSTETRICS AND GYNECOLOGY | Facility: CLINIC | Age: 27
End: 2017-05-24

## 2017-05-31 ENCOUNTER — PATIENT MESSAGE (OUTPATIENT)
Dept: OBSTETRICS AND GYNECOLOGY | Facility: CLINIC | Age: 27
End: 2017-05-31

## 2017-06-06 ENCOUNTER — HOSPITAL ENCOUNTER (INPATIENT)
Facility: OTHER | Age: 27
LOS: 2 days | Discharge: HOME | End: 2017-06-09
Attending: OBSTETRICS & GYNECOLOGY | Admitting: OBSTETRICS & GYNECOLOGY
Payer: MEDICAID

## 2017-06-06 DIAGNOSIS — O14.13 PRE-ECLAMPSIA, SEVERE, ANTEPARTUM, THIRD TRIMESTER: Primary | ICD-10-CM

## 2017-06-06 PROCEDURE — 96372 THER/PROPH/DIAG INJ SC/IM: CPT

## 2017-06-06 PROCEDURE — 96365 THER/PROPH/DIAG IV INF INIT: CPT

## 2017-06-06 PROCEDURE — 99285 EMERGENCY DEPT VISIT HI MDM: CPT | Mod: 25

## 2017-06-06 PROCEDURE — 59025 FETAL NON-STRESS TEST: CPT

## 2017-06-06 PROCEDURE — 51702 INSERT TEMP BLADDER CATH: CPT

## 2017-06-06 PROCEDURE — 96376 TX/PRO/DX INJ SAME DRUG ADON: CPT

## 2017-06-06 PROCEDURE — 11000001 HC ACUTE MED/SURG PRIVATE ROOM

## 2017-06-07 ENCOUNTER — ANESTHESIA (OUTPATIENT)
Dept: OBSTETRICS AND GYNECOLOGY | Facility: OTHER | Age: 27
End: 2017-06-07
Payer: MEDICAID

## 2017-06-07 ENCOUNTER — ANESTHESIA EVENT (OUTPATIENT)
Dept: OBSTETRICS AND GYNECOLOGY | Facility: OTHER | Age: 27
End: 2017-06-07
Payer: MEDICAID

## 2017-06-07 PROBLEM — O14.13 SEVERE PRE-ECLAMPSIA IN THIRD TRIMESTER: Status: ACTIVE | Noted: 2017-06-07

## 2017-06-07 PROBLEM — O14.10 PRE-ECLAMPSIA, SEVERE, ANTEPARTUM: Status: ACTIVE | Noted: 2017-06-07

## 2017-06-07 LAB
ABO + RH BLD: NORMAL
ALBUMIN SERPL BCP-MCNC: 3 G/DL
ALLENS TEST: ABNORMAL
ALP SERPL-CCNC: 114 U/L
ALT SERPL W/O P-5'-P-CCNC: 8 U/L
ANION GAP SERPL CALC-SCNC: 9 MMOL/L
AST SERPL-CCNC: 10 U/L
BASOPHILS # BLD AUTO: 0.01 K/UL
BASOPHILS NFR BLD: 0.1 %
BILIRUB SERPL-MCNC: 0.3 MG/DL
BLD GP AB SCN CELLS X3 SERPL QL: NORMAL
BUN SERPL-MCNC: 7 MG/DL
CALCIUM SERPL-MCNC: 8.6 MG/DL
CHLORIDE SERPL-SCNC: 107 MMOL/L
CO2 SERPL-SCNC: 22 MMOL/L
CREAT SERPL-MCNC: 0.7 MG/DL
CREAT UR-MCNC: 130.4 MG/DL
DIFFERENTIAL METHOD: ABNORMAL
EOSINOPHIL # BLD AUTO: 0.1 K/UL
EOSINOPHIL NFR BLD: 0.4 %
ERYTHROCYTE [DISTWIDTH] IN BLOOD BY AUTOMATED COUNT: 13 %
EST. GFR  (AFRICAN AMERICAN): >60 ML/MIN/1.73 M^2
EST. GFR  (NON AFRICAN AMERICAN): >60 ML/MIN/1.73 M^2
GLUCOSE SERPL-MCNC: 79 MG/DL
HCO3 UR-SCNC: 24.9 MMOL/L (ref 24–28)
HCT VFR BLD AUTO: 34.8 %
HGB BLD-MCNC: 11.9 G/DL
HIV 1+2 AB+HIV1 P24 AG SERPL QL IA: NEGATIVE
HIV1+2 IGG SERPL QL IA.RAPID: NEGATIVE
LDH SERPL L TO P-CCNC: 188 U/L
LYMPHOCYTES # BLD AUTO: 1.9 K/UL
LYMPHOCYTES NFR BLD: 15.4 %
MCH RBC QN AUTO: 28.6 PG
MCHC RBC AUTO-ENTMCNC: 34.2 %
MCV RBC AUTO: 84 FL
MONOCYTES # BLD AUTO: 0.9 K/UL
MONOCYTES NFR BLD: 7.3 %
NEUTROPHILS # BLD AUTO: 9.3 K/UL
NEUTROPHILS NFR BLD: 76.1 %
PCO2 BLDA: 52.2 MMHG (ref 35–45)
PH SMN: 7.29 [PH] (ref 7.35–7.45)
PLATELET # BLD AUTO: 228 K/UL
PMV BLD AUTO: 10.9 FL
PO2 BLDA: 18 MMHG (ref 80–100)
POC BE: -2 MMOL/L
POC SATURATED O2: 22 % (ref 95–100)
POTASSIUM SERPL-SCNC: 3.4 MMOL/L
PROT SERPL-MCNC: 6.7 G/DL
PROT UR-MCNC: 21 MG/DL
PROT/CREAT RATIO, UR: 0.16
RBC # BLD AUTO: 4.16 M/UL
RPR SER QL: NORMAL
SAMPLE: ABNORMAL
SITE: ABNORMAL
SODIUM SERPL-SCNC: 138 MMOL/L
WBC # BLD AUTO: 12.25 K/UL

## 2017-06-07 PROCEDURE — 72100003 HC LABOR CARE, EA. ADDL. 8 HRS

## 2017-06-07 PROCEDURE — 72100002 HC LABOR CARE, 1ST 8 HOURS

## 2017-06-07 PROCEDURE — 86900 BLOOD TYPING SEROLOGIC ABO: CPT

## 2017-06-07 PROCEDURE — 4A1HXCZ MONITORING OF PRODUCTS OF CONCEPTION, CARDIAC RATE, EXTERNAL APPROACH: ICD-10-PCS | Performed by: OBSTETRICS & GYNECOLOGY

## 2017-06-07 PROCEDURE — 11000001 HC ACUTE MED/SURG PRIVATE ROOM

## 2017-06-07 PROCEDURE — 63600175 PHARM REV CODE 636 W HCPCS: Performed by: OBSTETRICS & GYNECOLOGY

## 2017-06-07 PROCEDURE — 80053 COMPREHEN METABOLIC PANEL: CPT

## 2017-06-07 PROCEDURE — 27200710 HC EPIDURAL INFUSION PUMP SET: Performed by: STUDENT IN AN ORGANIZED HEALTH CARE EDUCATION/TRAINING PROGRAM

## 2017-06-07 PROCEDURE — 99900035 HC TECH TIME PER 15 MIN (STAT)

## 2017-06-07 PROCEDURE — 86592 SYPHILIS TEST NON-TREP QUAL: CPT

## 2017-06-07 PROCEDURE — 59200 INSERT CERVICAL DILATOR: CPT

## 2017-06-07 PROCEDURE — 86703 HIV-1/HIV-2 1 RESULT ANTBDY: CPT

## 2017-06-07 PROCEDURE — 25000003 PHARM REV CODE 250: Performed by: OBSTETRICS & GYNECOLOGY

## 2017-06-07 PROCEDURE — 59409 OBSTETRICAL CARE: CPT | Mod: AA,,, | Performed by: ANESTHESIOLOGY

## 2017-06-07 PROCEDURE — 25000003 PHARM REV CODE 250: Performed by: STUDENT IN AN ORGANIZED HEALTH CARE EDUCATION/TRAINING PROGRAM

## 2017-06-07 PROCEDURE — 62326 NJX INTERLAMINAR LMBR/SAC: CPT | Performed by: STUDENT IN AN ORGANIZED HEALTH CARE EDUCATION/TRAINING PROGRAM

## 2017-06-07 PROCEDURE — 82803 BLOOD GASES ANY COMBINATION: CPT

## 2017-06-07 PROCEDURE — 72200006 HC VAGINAL DELIVERY LEVEL III

## 2017-06-07 PROCEDURE — 83615 LACTATE (LD) (LDH) ENZYME: CPT

## 2017-06-07 PROCEDURE — 86850 RBC ANTIBODY SCREEN: CPT

## 2017-06-07 PROCEDURE — 3E0P7GC INTRODUCTION OF OTHER THERAPEUTIC SUBSTANCE INTO FEMALE REPRODUCTIVE, VIA NATURAL OR ARTIFICIAL OPENING: ICD-10-PCS | Performed by: OBSTETRICS & GYNECOLOGY

## 2017-06-07 PROCEDURE — 27800517 HC TRAY,EPIDURAL-CONTINUOUS: Performed by: STUDENT IN AN ORGANIZED HEALTH CARE EDUCATION/TRAINING PROGRAM

## 2017-06-07 PROCEDURE — 51702 INSERT TEMP BLADDER CATH: CPT

## 2017-06-07 PROCEDURE — 10907ZC DRAINAGE OF AMNIOTIC FLUID, THERAPEUTIC FROM PRODUCTS OF CONCEPTION, VIA NATURAL OR ARTIFICIAL OPENING: ICD-10-PCS | Performed by: OBSTETRICS & GYNECOLOGY

## 2017-06-07 PROCEDURE — 99283 EMERGENCY DEPT VISIT LOW MDM: CPT | Mod: ,,, | Performed by: OBSTETRICS & GYNECOLOGY

## 2017-06-07 PROCEDURE — 63600175 PHARM REV CODE 636 W HCPCS: Performed by: STUDENT IN AN ORGANIZED HEALTH CARE EDUCATION/TRAINING PROGRAM

## 2017-06-07 PROCEDURE — 82570 ASSAY OF URINE CREATININE: CPT

## 2017-06-07 PROCEDURE — 85025 COMPLETE CBC W/AUTO DIFF WBC: CPT

## 2017-06-07 PROCEDURE — 86703 HIV-1/HIV-2 1 RESULT ANTBDY: CPT | Mod: 91

## 2017-06-07 RX ORDER — SODIUM CITRATE AND CITRIC ACID MONOHYDRATE 334; 500 MG/5ML; MG/5ML
30 SOLUTION ORAL ONCE
Status: DISCONTINUED | OUTPATIENT
Start: 2017-06-07 | End: 2017-06-07

## 2017-06-07 RX ORDER — BUTALBITAL, ACETAMINOPHEN AND CAFFEINE 50; 325; 40 MG/1; MG/1; MG/1
2 TABLET ORAL EVERY 6 HOURS PRN
Status: DISCONTINUED | OUTPATIENT
Start: 2017-06-07 | End: 2017-06-07

## 2017-06-07 RX ORDER — FENTANYL CITRATE 50 UG/ML
INJECTION, SOLUTION INTRAMUSCULAR; INTRAVENOUS
Status: DISCONTINUED | OUTPATIENT
Start: 2017-06-07 | End: 2017-06-07

## 2017-06-07 RX ORDER — BUTALBITAL, ACETAMINOPHEN AND CAFFEINE 50; 325; 40 MG/1; MG/1; MG/1
2 TABLET ORAL ONCE
Status: DISCONTINUED | OUTPATIENT
Start: 2017-06-07 | End: 2017-06-07

## 2017-06-07 RX ORDER — CALCIUM GLUCONATE 98 MG/ML
1 INJECTION, SOLUTION INTRAVENOUS
Status: DISCONTINUED | OUTPATIENT
Start: 2017-06-07 | End: 2017-06-07

## 2017-06-07 RX ORDER — OXYTOCIN/RINGER'S LACTATE 20/1000 ML
41.65 PLASTIC BAG, INJECTION (ML) INTRAVENOUS CONTINUOUS
Status: ACTIVE | OUTPATIENT
Start: 2017-06-07 | End: 2017-06-08

## 2017-06-07 RX ORDER — METHYLERGONOVINE MALEATE 0.2 MG/ML
INJECTION INTRAVENOUS
Status: DISPENSED
Start: 2017-06-07 | End: 2017-06-08

## 2017-06-07 RX ORDER — MISOPROSTOL 200 UG/1
TABLET ORAL
Status: DISPENSED
Start: 2017-06-07 | End: 2017-06-08

## 2017-06-07 RX ORDER — MAGNESIUM SULFATE HEPTAHYDRATE 40 MG/ML
2 INJECTION, SOLUTION INTRAVENOUS ONCE
Status: COMPLETED | OUTPATIENT
Start: 2017-06-07 | End: 2017-06-07

## 2017-06-07 RX ORDER — OXYTOCIN/RINGER'S LACTATE 20/1000 ML
2 PLASTIC BAG, INJECTION (ML) INTRAVENOUS CONTINUOUS
Status: DISCONTINUED | OUTPATIENT
Start: 2017-06-07 | End: 2017-06-07

## 2017-06-07 RX ORDER — HYDRALAZINE HYDROCHLORIDE 20 MG/ML
5 INJECTION INTRAMUSCULAR; INTRAVENOUS ONCE
Status: DISCONTINUED | OUTPATIENT
Start: 2017-06-07 | End: 2017-06-07

## 2017-06-07 RX ORDER — OXYTOCIN 10 [USP'U]/ML
INJECTION, SOLUTION INTRAMUSCULAR; INTRAVENOUS
Status: DISPENSED
Start: 2017-06-07 | End: 2017-06-08

## 2017-06-07 RX ORDER — LIDOCAINE HYDROCHLORIDE AND EPINEPHRINE 15; 5 MG/ML; UG/ML
INJECTION, SOLUTION EPIDURAL
Status: DISCONTINUED | OUTPATIENT
Start: 2017-06-07 | End: 2017-06-07

## 2017-06-07 RX ORDER — DIPHENHYDRAMINE HYDROCHLORIDE 50 MG/ML
25 INJECTION INTRAMUSCULAR; INTRAVENOUS EVERY 4 HOURS PRN
Status: DISCONTINUED | OUTPATIENT
Start: 2017-06-07 | End: 2017-06-09 | Stop reason: HOSPADM

## 2017-06-07 RX ORDER — FENTANYL/BUPIVACAINE/NS/PF 2MCG/ML-.1
PLASTIC BAG, INJECTION (ML) INJECTION
Status: DISPENSED
Start: 2017-06-07 | End: 2017-06-08

## 2017-06-07 RX ORDER — TERBUTALINE SULFATE 1 MG/ML
0.25 INJECTION SUBCUTANEOUS
Status: DISCONTINUED | OUTPATIENT
Start: 2017-06-07 | End: 2017-06-07

## 2017-06-07 RX ORDER — MAGNESIUM SULFATE HEPTAHYDRATE 40 MG/ML
2 INJECTION, SOLUTION INTRAVENOUS CONTINUOUS
Status: DISCONTINUED | OUTPATIENT
Start: 2017-06-07 | End: 2017-06-08

## 2017-06-07 RX ORDER — FENTANYL/BUPIVACAINE/NS/PF 2MCG/ML-.1
PLASTIC BAG, INJECTION (ML) INJECTION CONTINUOUS
Status: DISCONTINUED | OUTPATIENT
Start: 2017-06-07 | End: 2017-06-07

## 2017-06-07 RX ORDER — OXYCODONE AND ACETAMINOPHEN 5; 325 MG/1; MG/1
1 TABLET ORAL EVERY 4 HOURS PRN
Status: DISCONTINUED | OUTPATIENT
Start: 2017-06-07 | End: 2017-06-09 | Stop reason: HOSPADM

## 2017-06-07 RX ORDER — FAMOTIDINE 10 MG/ML
20 INJECTION INTRAVENOUS ONCE
Status: DISCONTINUED | OUTPATIENT
Start: 2017-06-07 | End: 2017-06-07

## 2017-06-07 RX ORDER — METOCLOPRAMIDE HYDROCHLORIDE 5 MG/ML
10 INJECTION INTRAMUSCULAR; INTRAVENOUS ONCE
Status: DISCONTINUED | OUTPATIENT
Start: 2017-06-07 | End: 2017-06-07

## 2017-06-07 RX ORDER — IBUPROFEN 600 MG/1
600 TABLET ORAL EVERY 6 HOURS
Status: DISCONTINUED | OUTPATIENT
Start: 2017-06-07 | End: 2017-06-07

## 2017-06-07 RX ORDER — IBUPROFEN 600 MG/1
600 TABLET ORAL EVERY 6 HOURS PRN
Status: DISCONTINUED | OUTPATIENT
Start: 2017-06-07 | End: 2017-06-09 | Stop reason: HOSPADM

## 2017-06-07 RX ORDER — SODIUM CHLORIDE, SODIUM LACTATE, POTASSIUM CHLORIDE, CALCIUM CHLORIDE 600; 310; 30; 20 MG/100ML; MG/100ML; MG/100ML; MG/100ML
1000 INJECTION, SOLUTION INTRAVENOUS CONTINUOUS
Status: DISCONTINUED | OUTPATIENT
Start: 2017-06-07 | End: 2017-06-07

## 2017-06-07 RX ORDER — ONDANSETRON 8 MG/1
8 TABLET, ORALLY DISINTEGRATING ORAL EVERY 8 HOURS PRN
Status: DISCONTINUED | OUTPATIENT
Start: 2017-06-07 | End: 2017-06-07

## 2017-06-07 RX ORDER — BUTALBITAL, ACETAMINOPHEN AND CAFFEINE 50; 325; 40 MG/1; MG/1; MG/1
2 TABLET ORAL ONCE
Status: COMPLETED | OUTPATIENT
Start: 2017-06-07 | End: 2017-06-07

## 2017-06-07 RX ORDER — NIFEDIPINE 10 MG/1
CAPSULE ORAL
Status: DISPENSED
Start: 2017-06-07 | End: 2017-06-07

## 2017-06-07 RX ORDER — NIFEDIPINE 10 MG/1
10 CAPSULE ORAL ONCE
Status: COMPLETED | OUTPATIENT
Start: 2017-06-07 | End: 2017-06-07

## 2017-06-07 RX ORDER — SODIUM CHLORIDE, SODIUM LACTATE, POTASSIUM CHLORIDE, CALCIUM CHLORIDE 600; 310; 30; 20 MG/100ML; MG/100ML; MG/100ML; MG/100ML
INJECTION, SOLUTION INTRAVENOUS CONTINUOUS
Status: DISCONTINUED | OUTPATIENT
Start: 2017-06-07 | End: 2017-06-07

## 2017-06-07 RX ORDER — BETAMETHASONE SODIUM PHOSPHATE AND BETAMETHASONE ACETATE 3; 3 MG/ML; MG/ML
12 INJECTION, SUSPENSION INTRA-ARTICULAR; INTRALESIONAL; INTRAMUSCULAR; SOFT TISSUE EVERY 24 HOURS
Status: DISCONTINUED | OUTPATIENT
Start: 2017-06-07 | End: 2017-06-07

## 2017-06-07 RX ORDER — FENTANYL CITRATE 50 UG/ML
INJECTION, SOLUTION INTRAMUSCULAR; INTRAVENOUS
Status: DISPENSED
Start: 2017-06-07 | End: 2017-06-08

## 2017-06-07 RX ORDER — SODIUM CHLORIDE, SODIUM LACTATE, POTASSIUM CHLORIDE, CALCIUM CHLORIDE 600; 310; 30; 20 MG/100ML; MG/100ML; MG/100ML; MG/100ML
INJECTION, SOLUTION INTRAVENOUS CONTINUOUS
Status: DISCONTINUED | OUTPATIENT
Start: 2017-06-07 | End: 2017-06-09 | Stop reason: HOSPADM

## 2017-06-07 RX ORDER — HYDROCORTISONE 25 MG/G
CREAM TOPICAL 3 TIMES DAILY PRN
Status: DISCONTINUED | OUTPATIENT
Start: 2017-06-07 | End: 2017-06-09 | Stop reason: HOSPADM

## 2017-06-07 RX ORDER — OXYCODONE AND ACETAMINOPHEN 10; 325 MG/1; MG/1
1 TABLET ORAL EVERY 4 HOURS PRN
Status: DISCONTINUED | OUTPATIENT
Start: 2017-06-07 | End: 2017-06-09 | Stop reason: HOSPADM

## 2017-06-07 RX ORDER — MISOPROSTOL 200 UG/1
800 TABLET ORAL
Status: DISCONTINUED | OUTPATIENT
Start: 2017-06-07 | End: 2017-06-09 | Stop reason: HOSPADM

## 2017-06-07 RX ORDER — BUPIVACAINE HYDROCHLORIDE 2.5 MG/ML
INJECTION, SOLUTION EPIDURAL; INFILTRATION; INTRACAUDAL
Status: DISPENSED
Start: 2017-06-07 | End: 2017-06-08

## 2017-06-07 RX ORDER — FENTANYL/BUPIVACAINE/NS/PF 2MCG/ML-.1
PLASTIC BAG, INJECTION (ML) INJECTION CONTINUOUS PRN
Status: DISCONTINUED | OUTPATIENT
Start: 2017-06-07 | End: 2017-06-07

## 2017-06-07 RX ORDER — ONDANSETRON 8 MG/1
8 TABLET, ORALLY DISINTEGRATING ORAL EVERY 8 HOURS PRN
Status: DISCONTINUED | OUTPATIENT
Start: 2017-06-07 | End: 2017-06-09 | Stop reason: HOSPADM

## 2017-06-07 RX ORDER — OXYTOCIN/RINGER'S LACTATE 20/1000 ML
20 PLASTIC BAG, INJECTION (ML) INTRAVENOUS ONCE
Status: DISCONTINUED | OUTPATIENT
Start: 2017-06-07 | End: 2017-06-09 | Stop reason: HOSPADM

## 2017-06-07 RX ORDER — CARBOPROST TROMETHAMINE 250 UG/ML
INJECTION, SOLUTION INTRAMUSCULAR
Status: DISPENSED
Start: 2017-06-07 | End: 2017-06-08

## 2017-06-07 RX ORDER — CARBOPROST TROMETHAMINE 250 UG/ML
250 INJECTION, SOLUTION INTRAMUSCULAR
Status: DISCONTINUED | OUTPATIENT
Start: 2017-06-07 | End: 2017-06-09 | Stop reason: HOSPADM

## 2017-06-07 RX ORDER — MISOPROSTOL 100 MCG
25 TABLET ORAL EVERY 4 HOURS
Status: DISCONTINUED | OUTPATIENT
Start: 2017-06-07 | End: 2017-06-07

## 2017-06-07 RX ADMIN — BUTALBITAL, ACETAMINOPHEN AND CAFFEINE 2 TABLET: 50; 325; 40 TABLET ORAL at 12:06

## 2017-06-07 RX ADMIN — MAGNESIUM SULFATE HEPTAHYDRATE 2 G/HR: 40 INJECTION, SOLUTION INTRAVENOUS at 02:06

## 2017-06-07 RX ADMIN — FENTANYL CITRATE 100 MCG: 50 INJECTION, SOLUTION INTRAMUSCULAR; INTRAVENOUS at 01:06

## 2017-06-07 RX ADMIN — MAGNESIUM SULFATE IN WATER 2 G: 40 INJECTION, SOLUTION INTRAVENOUS at 01:06

## 2017-06-07 RX ADMIN — BUTALBITAL, ACETAMINOPHEN AND CAFFEINE 2 TABLET: 50; 325; 40 TABLET ORAL at 05:06

## 2017-06-07 RX ADMIN — IBUPROFEN 600 MG: 600 TABLET, FILM COATED ORAL at 09:06

## 2017-06-07 RX ADMIN — Medication 25 MCG: at 02:06

## 2017-06-07 RX ADMIN — DEXTROSE 2.5 MILLION UNITS: 50 INJECTION, SOLUTION INTRAVENOUS at 10:06

## 2017-06-07 RX ADMIN — Medication 2 MILLI-UNITS/MIN: at 06:06

## 2017-06-07 RX ADMIN — BETAMETHASONE SODIUM PHOSPHATE AND BETAMETHASONE ACETATE 12 MG: 3; 3 INJECTION, SUSPENSION INTRA-ARTICULAR; INTRALESIONAL; INTRAMUSCULAR at 01:06

## 2017-06-07 RX ADMIN — SODIUM CHLORIDE, SODIUM LACTATE, POTASSIUM CHLORIDE, AND CALCIUM CHLORIDE 1000 ML: .6; .31; .03; .02 INJECTION, SOLUTION INTRAVENOUS at 01:06

## 2017-06-07 RX ADMIN — DEXTROSE 2.5 MILLION UNITS: 50 INJECTION, SOLUTION INTRAVENOUS at 02:06

## 2017-06-07 RX ADMIN — Medication 10 ML: at 01:06

## 2017-06-07 RX ADMIN — LIDOCAINE HYDROCHLORIDE AND EPINEPHRINE 3 ML: 15; 5 INJECTION, SOLUTION EPIDURAL at 01:06

## 2017-06-07 RX ADMIN — DEXTROSE 5 MILLION UNITS: 50 INJECTION, SOLUTION INTRAVENOUS at 02:06

## 2017-06-07 RX ADMIN — NIFEDIPINE 10 MG: 10 CAPSULE, LIQUID FILLED ORAL at 12:06

## 2017-06-07 RX ADMIN — SODIUM CHLORIDE, SODIUM LACTATE, POTASSIUM CHLORIDE, AND CALCIUM CHLORIDE: .6; .31; .03; .02 INJECTION, SOLUTION INTRAVENOUS at 11:06

## 2017-06-07 RX ADMIN — NIFEDIPINE 10 MG: 10 CAPSULE, LIQUID FILLED ORAL at 06:06

## 2017-06-07 RX ADMIN — MAGNESIUM SULFATE HEPTAHYDRATE 2 G/HR: 40 INJECTION, SOLUTION INTRAVENOUS at 10:06

## 2017-06-07 RX ADMIN — Medication 10 ML/HR: at 01:06

## 2017-06-07 RX ADMIN — DEXTROSE 2.5 MILLION UNITS: 50 INJECTION, SOLUTION INTRAVENOUS at 06:06

## 2017-06-07 NOTE — PROGRESS NOTES
Cooks catheter inserted by Dr. White. U/V balloons inflated 20/20 per verbal order. Orders to increase 20/20 q20 minutes until balloons inflated to 80/80. Will continue to monitor pt.

## 2017-06-07 NOTE — PROGRESS NOTES
"MAG NOTE/LABOR NOTE     Macie Guzman is a 26 y.o.  who is undergoing induction of labor secondary to PreE w/SF at 36w0d, on MgSO4 for seizure prophylaxis.    To bedside for deep variable deceleration x 2 after epidural placement, spontaneously recovered.    GARCÍA returned around noon, improving after fioricet. Pt denies vision changes/CP/SOB. Denies lightheadedness/dizziness. Denies RUQ or epigastric pain.       Objective:       BP (!) 154/78   Pulse 93   Temp 97.7 °F (36.5 °C)   Resp 18   Ht 5' 1" (1.549 m)   Wt 101.6 kg (224 lb)   LMP 2016   SpO2 (!) 94%   Breastfeeding? No   BMI 42.32 kg/m²   Vitals:    17 1141 17 1155 17 1210 17 1310   BP: (!) 141/96 (!) 140/91 139/83 (!) 154/78   BP Method:       Pulse: 88 82 78 93   Resp:       Temp:       TempSrc:       SpO2:       Weight:       Height:             I/O last 3 completed shifts:  In: 340.8 [I.V.:340.8]  Out: 2250 [Urine:2250]    I/O this shift:  In: -   Out: 1700 [Urine:1700]      Date 17 07 - 17 0659   Shift 7642-5383 5719-6399 6959-5096 24 Hour Total   I  N  T  A  K  E   Shift Total  (mL/kg)       O  U  T  P  U  T   Urine  (mL/kg/hr) 1700   1700    Shift Total  (mL/kg) 1700  (16.7)   1700  (16.7)   Weight (kg) 101.6 101.6 101.6 101.6     General:   alert, appears stated age and cooperative   Lungs:   clear to auscultation bilaterally   Heart:   regular rate and rhythm, S1, S2 normal, no murmur, click, rub or gallop   Abdomen:  soft, non-tender; bowel sounds normal; no masses,  no organomegaly   Uterine Size:  gravid   Extremities: peripheral pulses normal, no pedal edema, no clubbing or cyanosis   Reflexes - 2+  bilaterally     SVE: 7/90/0  FHTs: 120 bpm, mod btbv, +deep variable x 2 w/ spont recovery, +accelerations after  Paul: q3-4 min    FSE placed this check    Lab Review    Chemistries:     Recent Labs  Lab 17  0110      K 3.4*      CO2 22*   BUN 7   CREATININE 0.7   CALCIUM " 8.6*   PROT 6.7   BILITOT 0.3   ALKPHOS 114   ALT 8*   AST 10        CBC/Anemia Labs:     Recent Labs  Lab 17  0110   WBC 12.25   HGB 11.9*   HCT 34.8*      MCV 84   RDW 13.0        PC ratio 0.16       Assessment:     Macie Guzman is a 26 y.o.  who is undergoing induction of labor secondary to PreE w/SF at 36w0d, on MgSO4 for seizure prophylaxis.     Plan:     Pre-e w/ SF (BP)  1. Continue magnesium sulfate, no signs of toxicity at this time  2. Monitor for s/s of worsening Pre-Eclampsia   - HA returned. Improving after fioricet.  3. Close maternal monitoring including UOP and BP   - BP: (130-167)/() 154/78   - Procardia 10mg x 2 at 0030, 0630. No sustained severe ranges since that time.   - UOP: 100-200cc/hr  4. Recheck in 2-4 hours or PRN    IOL  1. Intrapartum - cat 2 tracing, improving, reactive and reassuring. FSE in place.  2. S/p cook balloon, AROM  3. Continue pitocin augmentation as tolerated. IUPC in place.    Linda Angel MD  OB/GYN, PGY-1

## 2017-06-07 NOTE — ANESTHESIA PROCEDURE NOTES
Epidural    Patient location during procedure: OB   Reason for block: primary anesthetic   Diagnosis: IUP   Start time: 6/7/2017 1:25 PM  Timeout: 6/7/2017 1:24 PM  End time: 6/7/2017 1:35 PM  Surgery related to: Vaginal Delivery  Staffing  Anesthesiologist: FAB DAY  Resident/CRNA: NOHEMY BENNETT  Performed: resident/CRNA   Preanesthetic Checklist  Completed: patient identified, site marked, surgical consent, pre-op evaluation, timeout performed, IV checked, risks and benefits discussed, monitors and equipment checked, anesthesia consent given, hand hygiene performed and patient being monitored  Preparation  Patient position: sitting  Prep: ChloraPrep  Patient monitoring: Pulse Ox and Blood Pressure  Epidural  Skin Anesthetic: lidocaine 1%  Skin Wheal: 3 mL  Administration type: continuous  Approach: midline  Interspace: L3-4  Injection technique: ABI saline  Needle and Epidural Catheter  Needle type: Tuohy   Needle gauge: 17  Needle length: 3.5 inches  Needle insertion depth: 6 cm  Catheter type: [employer]  Catheter size: 19 G  Catheter at skin depth: 10 cm  Test dose: 3 mL of lidocaine 1.5% with Epi 1-to-200,000  Additional Documentation: incremental injection, negative aspiration for heme and CSF, no paresthesia on injection, no signs/symptoms of IV or SA injection, no significant pain on injection and no significant complaints from patient  Needle localization: anatomical landmarks  Medications:  Bolus administered: 10 mL of 0.125% bupivacaine  Epinephrine added: none  Opioid administered: 100 mcg of   fentanyl  Volume per aspiration: 5 mL  Time between aspirations: 5 minutes  Assessment  Ease of block: easy  Patient's tolerance of the procedure: comfortable throughout block and no complaints

## 2017-06-07 NOTE — PROGRESS NOTES
"MAG NOTE/LABOR NOTE     Macie Guzman is a 26 y.o.  who is undergoing induction of labor secondary to PreE w/SF at 36w0d, on MgSO4 for seizure prophylaxis.    Patient reports mildly painful contractions. She complains of a headache. Denies blurry vision, right upper quadrant pain, CP, SOB, nausea/vomiting.     Objective:       BP (!) 160/80 Comment: Dr. Angel  Pulse 83   Temp 97.2 °F (36.2 °C) (Temporal)   Resp 18   Ht 5' 1" (1.549 m)   Wt 101.6 kg (224 lb)   LMP 2016   SpO2 95%   Breastfeeding? No   BMI 42.32 kg/m²   Vitals:    17 0524 17 0554 17 0559 17 0610   BP: (!) 148/73 (!) 161/77  (!) 160/80   BP Method:       Pulse: 75 81  83   Resp:       Temp:   97.2 °F (36.2 °C)    TempSrc:   Temporal    SpO2: 97% 95%  95%   Weight:       Height:             No intake/output data recorded.    I/O this shift:  In: 340.8 [I.V.:340.8]  Out: 2250 [Urine:2250]       General:   alert, appears stated age and cooperative   Lungs:   clear to auscultation bilaterally   Heart:   regular rate and rhythm, S1, S2 normal, no murmur, click, rub or gallop   Abdomen:  soft, non-tender; bowel sounds normal; no masses,  no organomegaly   Uterine Size:  gravid   Extremities: peripheral pulses normal, no pedal edema, no clubbing or cyanosis   Reflexes - 2+  bilaterally     SVE: 2/60/-3, will place cook balloon  FHTs: 150bpm, Cat 1, reassuring  [place]: ctx q5-7 min    Lab Review    Chemistries:     Recent Labs  Lab 17  0110      K 3.4*      CO2 22*   BUN 7   CREATININE 0.7   CALCIUM 8.6*   PROT 6.7   BILITOT 0.3   ALKPHOS 114   ALT 8*   AST 10        CBC/Anemia Labs:     Recent Labs  Lab 17  0110   WBC 12.25   HGB 11.9*   HCT 34.8*      MCV 84   RDW 13.0        PC ratio 0.16       Assessment:     Macie Guzman is a 26 y.o.  who is undergoing induction of labor secondary to PreE w/SF at 36w0d, on MgSO4 for seizure prophylaxis.     Plan:   1. Continue magnesium " sulfate, no signs of toxicity at this time  2. Monitor for s/s of worsening Pre-Eclampsia   - complains of headache - fioricet given  3. Close maternal monitoring including UOP and BP   - BP severe range x 2 --> procardia 10mg po given, repeat BP in 20 minutes   - UOP 500cc/hr  4. Recheck in 2-4 hours or PRN  5. Intrapartum - cat 1 tracing, will place cook balloon, start pitocin    Evie Ochoa M.D.  PGY-3 OB/GYN  [phone]

## 2017-06-07 NOTE — PROGRESS NOTES
Dr. Good notified pt complaining of headache rated 5/10. Md states will put in orders for fiorcet. Will continue to monitor pt.

## 2017-06-07 NOTE — PROGRESS NOTES
"LABOR NOTE    S:  Complaints: No.  Epidural working:  not applicable      O: BP (!) 155/75   Pulse 88   Temp 97.7 °F (36.5 °C)   Resp 18   Ht 5' 1" (1.549 m)   Wt 101.6 kg (224 lb)   LMP 2016   SpO2 (!) 94%   Breastfeeding? No   BMI 42.32 kg/m²       FHT: 130 bpm Cat 1 (reassuring)  CTX: q 2 minutes  SVE: 6/70/-2    AROM this check, clear fluid, fetal vertex well engaged  IUPC placed after noting placental location    ASSESSMENT:   26 y.o.  at 36w0d, IOL for preE w/ SF    FHT reassuring    Active Hospital Problems    Diagnosis  POA    Severe pre-eclampsia in third trimester [O14.13]  Yes    Late prenatal care [O09.30]  Not Applicable    Pregnancy with one fetus/bottle/btl papers signed 17 [Z34.90]  Not Applicable    History of gestational hypertension - prior pregnancy [Z87.59]  Not Applicable    Choroid plexus cyst of fetus affecting management of mother in cabrera pregnancy, antepartum [O35.0XX0]  Yes    Echogenic focus of bowel of fetus affecting management of mother in cabrera pregnancy, antepartum [O35.8XX0]  Yes      Resolved Hospital Problems    Diagnosis Date Resolved POA   No resolved problems to display.         PLAN:    Continue Close Maternal/Fetal Monitoring  Pitocin Augmentation per protocol, currently at 10  Recheck 2 hours or PRN    Linda Angel MD  OB/GYN, PGY-1      "

## 2017-06-07 NOTE — PROGRESS NOTES
"MAG NOTE/LABOR NOTE     Macie Guzman is a 26 y.o.  who is undergoing induction of labor secondary to PreE w/SF at 36w0d, on MgSO4 for seizure prophylaxis.    Patient reports resolution of HA after second dose of fioricet. Denies vision changes, CP, SOB, RUQ pain. She declines epidural at this time.     Objective:       BP (!) 143/79   Pulse 87   Temp 97.2 °F (36.2 °C) (Temporal)   Resp 18   Ht 5' 1" (1.549 m)   Wt 101.6 kg (224 lb)   LMP 2016   SpO2 100%   Breastfeeding? No   BMI 42.32 kg/m²   Vitals:    17 0742 17 0755 17 0810 17 0825   BP: (!) 144/85 (!) 147/77 (!) 142/82 (!) 143/79   BP Method:       Pulse: 106 92 84 87   Resp:       Temp:       TempSrc:       SpO2:  97% 99% 100%   Weight:       Height:             I/O last 3 completed shifts:  In: 340.8 [I.V.:340.8]  Out: 2250 [Urine:2250]    I/O this shift:  In: -   Out: 725 [Urine:725]      Date 17 07 - 17 0659   Shift 7760-6672 4776-3187 8148-6256 24 Hour Total   I  N  T  A  K  E   Shift Total  (mL/kg)       O  U  T  P  U  T   Urine  (mL/kg/hr) 725   725    Shift Total  (mL/kg) 725  (7.1)   725  (7.1)   Weight (kg) 101.6 101.6 101.6 101.6     General:   alert, appears stated age and cooperative   Lungs:   clear to auscultation bilaterally   Heart:   regular rate and rhythm, S1, S2 normal, no murmur, click, rub or gallop   Abdomen:  soft, non-tender; bowel sounds normal; no masses,  no organomegaly   Uterine Size:  gravid   Extremities: peripheral pulses normal, no pedal edema, no clubbing or cyanosis   Reflexes - 2+  bilaterally     SVE: 4/70/-3, cook balloon left in place  FHTs: 120 bpm, Cat 1, reassuring  [place]: difficult picking up, will readjust toco    Lab Review    Chemistries:     Recent Labs  Lab 17  0110      K 3.4*      CO2 22*   BUN 7   CREATININE 0.7   CALCIUM 8.6*   PROT 6.7   BILITOT 0.3   ALKPHOS 114   ALT 8*   AST 10        CBC/Anemia Labs:     Recent Labs  Lab " 17  0110   WBC 12.25   HGB 11.9*   HCT 34.8*      MCV 84   RDW 13.0        PC ratio 0.16       Assessment:     Macie Guzman is a 26 y.o.  who is undergoing induction of labor secondary to PreE w/SF at 36w0d, on MgSO4 for seizure prophylaxis.     Plan:     Pre-e w/ SF (BP)  1. Continue magnesium sulfate, no signs of toxicity at this time  2. Monitor for s/s of worsening Pre-Eclampsia   - HA resolved  3. Close maternal monitoring including UOP and BP   - BP: (130-167)/() 145/88   - Procardia 10mg x 2 at 0030, 0630. No sustained severe ranges since that time.   - UOP: 125-375cc/hr  4. Recheck in 2-4 hours or PRN    IOL  1. Intrapartum - cat 1 tracing, will place cook balloon, start pitocin  2. Cook balloon in place  3. Will AROM next check if engaged  4. Continue pitocin augmentation per protocol, currently at 4    Linda Angel MD  OB/GYN, PGY-1

## 2017-06-07 NOTE — ED PROVIDER NOTES
Encounter Date: 2017       History     Chief Complaint   Patient presents with    Contractions     Review of patient's allergies indicates:  No Known Allergies    Macie Guzman is a 26 y.o. [id number] at 36w0d by 25w US presents complaining of contractions.   This IUP is complicated by late PNC, fetal chorioid plexus cyst and echogenic bowel (neg MT21), obesity, hx of GHTN.    Patient reports she has had contractions for the past 2 days, but they have become more painful and frequent tonight which prompted her to come to the ED. Contractions are every 3-4 minutes per patient. Denies vaginal bleeding, denies LOF.   Fetal Movement: normal.    She also complains of a headache, she has not taken any medication for this. Denies visual changes, RUQ pain, CP, SOB.            No past medical history on file.  No past surgical history on file.  Family History   Problem Relation Age of Onset    Colon cancer Neg Hx     Eclampsia Neg Hx     Breast cancer Neg Hx     Ovarian cancer Neg Hx      Social History   Substance Use Topics    Smoking status: Never Smoker    Smokeless tobacco: Never Used    Alcohol use No     Review of Systems   Constitutional: Negative for appetite change, chills and fever.   HENT: Negative for congestion and sore throat.    Respiratory: Negative for chest tightness, shortness of breath and wheezing.    Cardiovascular: Negative for chest pain and leg swelling.   Gastrointestinal: Positive for abdominal pain (contractions). Negative for constipation, diarrhea, nausea and vomiting.   Genitourinary: Negative for dysuria, frequency, vaginal bleeding, vaginal discharge and vaginal pain.   Musculoskeletal: Negative for back pain.   Neurological: Positive for headaches. Negative for dizziness.   Psychiatric/Behavioral: Negative for agitation, behavioral problems and confusion.       Physical Exam     Temp:  [97.5 °F (36.4 °C)-97.9 °F (36.6 °C)] 97.9 °F (36.6 °C)  Pulse:  [] 94  Resp:  [18]  18  SpO2:  [94 %-100 %] 97 %  BP: (130-166)/() 135/66    166/93 > 166/100 > 165/94 (manual) --> s/p procardia 10mg po --> 130/84    Physical Exam    Nursing note and vitals reviewed.  Constitutional: She appears well-developed and well-nourished. She is not diaphoretic. No distress.   HENT:   Head: Normocephalic and atraumatic.   Eyes: Conjunctivae and EOM are normal.   Neck: Normal range of motion.   Cardiovascular: Normal rate, regular rhythm and normal heart sounds.   Pulmonary/Chest: Breath sounds normal. No respiratory distress. She has no wheezes. She has no rales.   Abdominal: Soft. She exhibits no distension. There is no tenderness (no RUQ TTP). There is no rebound and no guarding.   Musculoskeletal: Normal range of motion.   Neurological: She is alert and oriented to person, place, and time. She has normal reflexes.   Skin: Skin is warm and dry.   Psychiatric: She has a normal mood and affect.     OB LABOR EXAM:       Method: Sterile vaginal exam per MD.   Vaginal Bleeding: none present.     Dilatation: 0.   Station: -3.   Amniotic Fluid Color: no fluid.     Comments: NST Interpretation:   140 BPM baseline  Variability: moderate  Accelerations: present  Decelerations: absent  Contractions: none noted on toco    Clinical Impression: Reactive Non-Stress Test    Vertex presentation via ultrasound         ED Course   Procedures  Labs Reviewed   CBC W/ AUTO DIFFERENTIAL - Abnormal; Notable for the following:        Result Value    Hemoglobin 11.9 (*)     Hematocrit 34.8 (*)     Gran # 9.3 (*)     Gran% 76.1 (*)     Lymph% 15.4 (*)     All other components within normal limits   COMPREHENSIVE METABOLIC PANEL   PROTEIN / CREATININE RATIO, URINE   LACTATE DEHYDROGENASE   HIV 1 / 2 ANTIBODY   RPR   RAPID HIV   TYPE & SCREEN             Medical Decision Making:   Initial Assessment:   25yo  at 36w0d with PreE w/SF  - severe range BP and headache                   ED Course     Clinical Impression:   The  encounter diagnosis was Pre-eclampsia, severe, antepartum, third trimester.    Disposition:   Disposition: Admitted  Condition: Stable    1. PreE w/SF  - severe range BP and headache  - procardia 10mg po and fioricet po given in triage  - start IV  - admit to L&D  - start MgSO4 for seizure prophlylaxis  - close monitoring of BP and symptoms  - recommend induction of labor as patient has PreE w/SF after 34 weeks  - follow up PreE labs    2. Induction of labor  - cytotec PV  - continuous maternal fetal monitoring  - consents signed  - Late term steroids    3. GBS unknown  - start PCN for GBS ppx       Evie Ochoa MD  Resident  06/07/17 0051       Evie Ochoa MD  Resident  06/07/17 [id number]

## 2017-06-07 NOTE — MEDICAL/APP STUDENT
"MAG NOTE     Macie Guzman is a 26 y.o. female  who is undergoing induction of labor secondary to PreE w/SF at 36w0d, on MgSO4 for seizure prophylaxis.    Patient reports resolution of HA after second dose of fioricet. Denies vision changes, CP, SOB, RUQ pain. She elected for epidural.      Objective:       BP (!) 154/78   Pulse 93   Temp 97.7 °F (36.5 °C)   Resp 18   Ht 5' 1" (1.549 m)   Wt 101.6 kg (224 lb)   LMP 2016   SpO2 (!) 94%   Breastfeeding? No   BMI 42.32 kg/m²   Vitals:    17 1141 17 1155 17 1210 17 1310   BP: (!) 141/96 (!) 140/91 139/83 (!) 154/78   BP Method:       Pulse: 88 82 78 93   Resp:       Temp:       TempSrc:       SpO2:       Weight:       Height:             I/O last 3 completed shifts:  In: 340.8 [I.V.:340.8]  Out: 2250 [Urine:2250]    I/O this shift:  In: -   Out: 1700 [Urine:1700]      Date 17 07 - 17 0659   Shift 4801-5315 8332-9806 3923-3935 24 Hour Total   I  N  T  A  K  E   Shift Total  (mL/kg)       O  U  T  P  U  T   Urine  (mL/kg/hr) 1700   1700    Shift Total  (mL/kg) 1700  (16.7)   1700  (16.7)   Weight (kg) 101.6 101.6 101.6 101.6         General:   alert, appears stated age and cooperative   Lungs:   clear to auscultation bilaterally   Heart:   regular rate and rhythm, S1, S2 normal, no murmur, click, rub or gallop   Abdomen:  soft, non-tender; bowel sounds normal; no masses,  no organomegaly   Uterine Size:  firm located at the umblicus.    Extremities: peripheral pulses normal, no pedal edema, no clubbing or cyanosis  Reflexes - 2+  bilaterally      SVE: 7/90/-2   FHTs: 110 bpm, Cat 2, + decelerations        Lab Review  Recent Results (from the past 48 hour(s))   CBC with Auto Differential    Collection Time: 17  1:10 AM   Result Value Ref Range    WBC 12.25 3.90 - 12.70 K/uL    RBC 4.16 4.00 - 5.40 M/uL    Hemoglobin 11.9 (L) 12.0 - 16.0 g/dL    Hematocrit 34.8 (L) 37.0 - 48.5 %    MCV 84 82 - 98 fL    MCH " 28.6 27.0 - 31.0 pg    MCHC 34.2 32.0 - 36.0 %    RDW 13.0 11.5 - 14.5 %    Platelets 228 150 - 350 K/uL    MPV 10.9 9.2 - 12.9 fL    Gran # 9.3 (H) 1.8 - 7.7 K/uL    Lymph # 1.9 1.0 - 4.8 K/uL    Mono # 0.9 0.3 - 1.0 K/uL    Eos # 0.1 0.0 - 0.5 K/uL    Baso # 0.01 0.00 - 0.20 K/uL    Gran% 76.1 (H) 38.0 - 73.0 %    Lymph% 15.4 (L) 18.0 - 48.0 %    Mono% 7.3 4.0 - 15.0 %    Eosinophil% 0.4 0.0 - 8.0 %    Basophil% 0.1 0.0 - 1.9 %    Differential Method Automated    Type & Screen    Collection Time: 17  1:10 AM   Result Value Ref Range    Group & Rh O POS     Indirect Martha NEG    Comprehensive metabolic panel    Collection Time: 17  1:10 AM   Result Value Ref Range    Sodium 138 136 - 145 mmol/L    Potassium 3.4 (L) 3.5 - 5.1 mmol/L    Chloride 107 95 - 110 mmol/L    CO2 22 (L) 23 - 29 mmol/L    Glucose 79 70 - 110 mg/dL    BUN, Bld 7 6 - 20 mg/dL    Creatinine 0.7 0.5 - 1.4 mg/dL    Calcium 8.6 (L) 8.7 - 10.5 mg/dL    Total Protein 6.7 6.0 - 8.4 g/dL    Albumin 3.0 (L) 3.5 - 5.2 g/dL    Total Bilirubin 0.3 0.1 - 1.0 mg/dL    Alkaline Phosphatase 114 55 - 135 U/L    AST 10 10 - 40 U/L    ALT 8 (L) 10 - 44 U/L    Anion Gap 9 8 - 16 mmol/L    eGFR if African American >60 >60 mL/min/1.73 m^2    eGFR if non African American >60 >60 mL/min/1.73 m^2   Lactate dehydrogenase    Collection Time: 17  1:10 AM   Result Value Ref Range     110 - 260 U/L   Rapid HIV    Collection Time: 17  1:10 AM   Result Value Ref Range    HIV Rapid Testing Negative Negative   Protein / creatinine ratio, urine    Collection Time: 17  1:49 AM   Result Value Ref Range    Protein, Urine Random 21 (H) 0 - 15 mg/dL    Creatinine, Random Ur 130.4 15.0 - 325.0 mg/dL    Prot/Creat Ratio, Ur 0.16 0.00 - 0.20          Assessment:     Macie Guzman is a 26 y.o.  who is undergoing induction of labor secondary to PreE w/SF at 36w0d, on MgSO4 for seizure prophylaxis.    Active Hospital Problems    Diagnosis  POA     Severe pre-eclampsia in third trimester [O14.13]  Yes    Late prenatal care [O09.30]  Not Applicable    Pregnancy with one fetus/bottle/btl papers signed 4/26/17 [Z34.90]  Not Applicable    History of gestational hypertension - prior pregnancy [Z87.59]  Not Applicable    Choroid plexus cyst of fetus affecting management of mother in cabrera pregnancy, antepartum [O35.0XX0]  Yes    Echogenic focus of bowel of fetus affecting management of mother in cabrera pregnancy, antepartum [O35.8XX0]  Yes      Resolved Hospital Problems    Diagnosis Date Resolved POA   No resolved problems to display.        Plan:   Pre-e w/ SF (BP)  1. Continue magnesium sulfate, no signs of toxicity at this time  2. Monitor for s/s of worsening Pre-Eclampsia                        - HA resolved  3. Close maternal monitoring including UOP and BP                        - BP: (139-154)/(78-91) 147/85                        - Procardia 10mg x 2 at 0030, 0630. No sustained severe ranges since that time.                        - UOP: 175-355cc/hr  4. Recheck in 2-4 hours or PRN     IOL  1. Intrapartum - cat 1 tracing  2. Continue pitocin augmentation per protocol, currently at 20U  3. Patient elected for epidural  4. S/p AROM and IUPC placement

## 2017-06-07 NOTE — L&D DELIVERY NOTE
To bedside for examination as FHTs with recurrent deep variable decelerations.  Patient found to be complete, +2 to +3 station. Available nursing staff called to room. L&D attending made aware.   Fetal head at introitus without maternal effort.    with 1 contraction.  Under epidural anesthesia.  Infant delivered OA over intact perineum.  Male infant delivered and placed on maternal abdomen for skin to skin. Infant with weak cry and movement, despite much stimulation by nursing at bedside.   Clamps not immediately available due to precipitous nature of delivery.   Cord clamped and cut after approximately 1 minute. Infant handed off to nursing staff. NICU called for assistance.   Umbilical arterial gas and venous blood obtained.  Placenta delivered spontaneously and IV pitocin given.  Uterus noted to be mildly atonic. Cytotec 800mcg placed NE.   No  lacerations.  Patient tolerated delivery well.   cc  Staff present for entire procedure.  S/L/N counts correct x2.    Linda Angel MD  OB/GYN, PGY-1       Delivery Information for  Shin Guzman    Birth information:  YOB: 2016   Time of birth: 3:00 PM   Sex: male   Head Delivery Date/Time: 2017  3:00 PM   Delivery type: Vaginal, Spontaneous Delivery   Gestational Age: 36w0d    Delivery Providers    Delivering clinician:  Linda Angel MD   Other personnel:   Provider Role   MD Sheela Carpenter RN Ana Valente, MD                       [place] Assessment    Living status:  Living   Apgars:      1 Minute:   5 Minute:   10 Minute:   15 Minute:   20 Minute:     Skin Color:   0  1       Heart Rate:   1  2       Reflex Irritability:   0  2       Muscle Tone:   1  2       Respiratory Effort:   0  2       Total:   2  9                  Apgars Assigned By:  GIANA SUH/MADDISON          Assisted Delivery Details:    Forceps attempted?:  No   Vacuum extractor attempted?:  No             Shoulder Dystocia     Shoulder dystocia present?:  No            Presentation and Position    Presentation:   Vertex   Position:       Occiput    Anterior               Interventions/Resuscitation    Method:  Bulb Suctioning, Tactile Stimulation, CPAP, NICU Attended        Cord    Vessels:  3 vessels   Complications:  None   Delayed Cord Clamping?:  Yes   Cord Clamped Date/Time:  2017  3:02 PM   Cord Blood Disposition:  Sent with Baby   Gases Sent?:  Yes   Stem Cell Collection (by MD):  No        Placenta    Date and time:  2017  3:04 PM   Removal:  Spontaneous   Appearance:  Intact   Placenta disposition:  discarded            Labor Events:       labor:       Labor Onset Date/Time:         Dilation Complete Date/Time:         Start Pushing Date/Time:       Rupture Date/Time:              Rupture type:           Fluid Amount:        Fluid Color:        Fluid Odor:        Membrane Status (PeriCalm): ARM (Artificial Rupture)      Rupture Date/Time (PeriCalm): 2017 12:17:00      Fluid Amount (PeriCalm): Small      Fluid Color (PeriCalm): Clear       steroids:       Antibiotics given for GBS:       Induction:       Indications for induction:        Augmentation:       Indications for augmentation:       Labor complications:       Additional complications:          Cervical ripening:                     Delivery:      Episiotomy: None     Indication for Episiotomy:       Perineal Lacerations: None Repaired:      Periurethral Laceration: none Repaired:     Labial Laceration: none Repaired:     Sulcus Laceration: none Repaired:     Vaginal Laceration: No Repaired:     Cervical Laceration: No Repaired:     Repair suture:       Repair # of packets:       Vaginal delivery QBL (mL): 150      QBL (mL): 0     Combined Blood Loss (mL): 150     Vaginal Sweep Performed: Yes     Surgicount Correct: Yes       Other providers:       Anesthesia    Method:  Epidural              Details (if  applicable):  Trial of Labor      Categorization:      Priority:     Indications for :     Incision Type:       Additional  information:  Forceps:    Vacuum:    Breech:    Observed anomalies    Other (Comments):

## 2017-06-07 NOTE — H&P
UPDATED HISTORY AND PHYSICAL          Subjective:      Macie Guzman is a 26 y.o. [id number] at 36w0d by 25w US presents complaining of contractions and headache.   This IUP is complicated by late PNC, fetal chorioid plexus cyst and echogenic bowel (neg MT21), obesity, hx of GHTN.     Patient reports she has had contractions for the past 2 days, but they have become more painful and frequent tonight which prompted her to come to the ED. Contractions are every 3-4 minutes per patient. Denies vaginal bleeding, denies LOF.   Fetal Movement: normal.     She also complains of a headache, she has not taken any medication for this. Denies visual changes, RUQ pain, CP, SOB.    PMHx: No past medical history on file.    PSHx: No past surgical history on file.    All: Review of patient's allergies indicates:  No Known Allergies    Meds:   (Not in a hospital admission)    SH:   Social History     Social History    Marital status: Single     Spouse name: N/A    Number of children: N/A    Years of education: N/A     Occupational History    Not on file.     Social History Main Topics    Smoking status: Never Smoker    Smokeless tobacco: Never Used    Alcohol use No    Drug use: No    Sexual activity: Yes     Partners: Male     Birth control/ protection: None     Other Topics Concern    Not on file     Social History Narrative    No narrative on file       FH:   Family History   Problem Relation Age of Onset    Colon cancer Neg Hx     Eclampsia Neg Hx     Breast cancer Neg Hx     Ovarian cancer Neg Hx        OBHx:   Obstetric History       T2      L2     SAB0   TAB0   Ectopic0   Multiple0   Live Births2       # Outcome Date GA Lbr Erick/2nd Weight Sex Delivery Anes PTL Lv   3 Current            2 Term 14 39w0d  2.892 kg (6 lb 6 oz) F Vag-Spont EPI N TANNER      Complications: Gestational (pregnancy-induced) hypertension without significant proteinuria, first trimester      Apgar1:  9                 Apgar5: 9   1 Term  37w0d  3.345 kg (7 lb 6 oz) F Vag-Spont  N TANNER      Obstetric Comments   Pt reports having 2 living children. Obstetric history initially had 4 NSVDs- updated on 3/8/2017 per patient report.        Objective:       /86   Pulse 95   Temp 97.5 °F (36.4 °C) (Oral)   Resp 18   LMP 2016   SpO2 98%   Breastfeeding? No     Vitals:    17 0056 17 0101 17 0103 17 0119   BP:   130/84 133/86   Pulse: 91 78 81 95   Resp:    18   Temp:       TempSrc:       SpO2: 98% 98%         General:   alert, appears stated age and cooperative   Lungs:   clear to auscultation bilaterally   Heart:   regular rate and rhythm, S1, S2 normal, no murmur, click, rub or gallop   Abdomen:  soft, non-tender; bowel sounds normal; no masses,  no organomegaly   Extremities negative edema, negative erythema, reflexes 2+ b/l   FHT: 140bpm Cat 1 (reassuring)                 TOCO: Difficult to  on toco   Presentations: cephalic by ultrasound   Cervix:     Dilation: Closed    Effacement: 50%    Station:  -3    Consistency: soft    Position: middle       Lab Review  Blood Type O POS  GBBS: unk  Rubella: Immune  RPR: NR 1T  HIV: negative 1T  HepB: negative       Assessment:      at 36w0d gestation, PreE w/SF (headache/BP)    Patient Active Problem List   Diagnosis    Choroid plexus cyst of fetus affecting management of mother in cabrera pregnancy, antepartum    Echogenic focus of bowel of fetus affecting management of mother in cabrera pregnancy, antepartum    Late prenatal care    Pregnancy with one fetus/bottle/btl papers signed 17    History of gestational hypertension - prior pregnancy    Morbid obesity with BMI of 40.0-44.9, adult    Severe pre-eclampsia in third trimester          Plan:     1. PreE w/SF  - severe range BP and headache  - procardia 10mg po and fioricet po given in triage  - start IV  - admit to L&D  - start MgSO4 for seizure prophlylaxis  - close  monitoring of BP and symptoms  - recommend induction of labor as patient has PreE w/SF after 34 weeks  - follow up PreE labs     2. Induction of labor  - cytotec PV  - continuous maternal fetal monitoring  - consents signed  - Late term steroids  - Risks, benefits, alternatives and possible complications have been discussed in detail with the patient.   - HIV/RPR drawn    3. GBS unknown  - start PCN for GBS ppx    4. Undesired fertility  - ppBTL consents signed  - medicaid consents printed, in chart      Evie Ochoa M.D.  PGY-3 OB/GYN.

## 2017-06-07 NOTE — ANESTHESIA PREPROCEDURE EVALUATION
2017  Pre-operative evaluation for * No surgery found *    Macie Guzman is a 26 y.o. female  @ 36/0 being induced for pre-E with SF. Pt with PMH obesity, gHTN, echogenic bowel, and fetal choroid plexus cyst. Pt with severe range BPs and HA, on mag. Pt is not interested in an epidural at this time. Patient had 2 successful epidurals for previous deliveries.     OB History    Para Term  AB Living   3 2 2   2   SAB TAB Ectopic Multiple Live Births      0 2      # Outcome Date GA Lbr Erick/2nd Weight Sex Delivery Anes PTL Lv   3 Current            2 Term 14 39w0d  2.892 kg (6 lb 6 oz) F Vag-Spont EPI N TANNER      Complications: Gestational (pregnancy-induced) hypertension without significant proteinuria, first trimester   1 Term  37w0d  3.345 kg (7 lb 6 oz) F Vag-Spont  N TANNER      Obstetric Comments   Pt reports having 2 living children. Obstetric history initially had 4 NSVDs- updated on 3/8/2017 per patient report.          Patient Active Problem List   Diagnosis    Choroid plexus cyst of fetus affecting management of mother in cabrera pregnancy, antepartum    Echogenic focus of bowel of fetus affecting management of mother in cabrera pregnancy, antepartum    Late prenatal care    Pregnancy with one fetus/bottle/btl papers signed 17    History of gestational hypertension - prior pregnancy    Morbid obesity with BMI of 40.0-44.9, adult    Severe pre-eclampsia in third trimester       Review of patient's allergies indicates:  No Known Allergies     No current facility-administered medications on file prior to encounter.      No current outpatient prescriptions on file prior to encounter.       No past surgical history on file.    Social History     Social History    Marital status: Single     Spouse name: N/A    Number of children: N/A    Years of education: N/A      Occupational History    Not on file.     Social History Main Topics    Smoking status: Never Smoker    Smokeless tobacco: Never Used    Alcohol use No    Drug use: No    Sexual activity: Yes     Partners: Male     Birth control/ protection: None     Other Topics Concern    Not on file     Social History Narrative    No narrative on file         Vital Signs Range (Last 24H):  Temp:  [36.4 °C (97.5 °F)]   Pulse:  []   Resp:  [18]   BP: (130-166)/()   SpO2:  [94 %-100 %]       CBC:   Recent Labs      06/07/17   0110   WBC  12.25   RBC  4.16   HGB  11.9*   HCT  34.8*   PLT  228   MCV  84   MCH  28.6   MCHC  34.2       CMP: No results for input(s): NA, K, CL, CO2, BUN, CREATININE, GLU, MG, PHOS, CALCIUM, ALBUMIN, PROT, ALKPHOS, ALT, AST, BILITOT in the last 72 hours.    INR  No results for input(s): INR, PROTIME, APTT in the last 72 hours.    Invalid input(s): PT        Anesthesia Evaluation    I have reviewed the Patient Summary Reports.     I have reviewed the Medications.     Review of Systems  Anesthesia Hx:  No problems with previous Anesthesia Denies Hx of Anesthetic complications  Neg history of prior surgery.  Denies Personal Hx of Anesthesia complications.   Social:  Non-Smoker, No Alcohol Use    Cardiovascular:   Exercise tolerance: good Hypertension    Pulmonary:  Pulmonary Normal    Renal/:  Renal/ Normal     Hepatic/GI:  Hepatic/GI Normal    Neurological:  Neurology Normal    Endocrine:  Endocrine Normal    Psych:  Psychiatric Normal           Physical Exam  General:  Well nourished, Obesity    Airway/Jaw/Neck:  Airway Findings: Mouth Opening: Normal Tongue: Normal  General Airway Assessment: Adult, Average  Mallampati: III  TM Distance: Normal, at least 6 cm  Jaw/Neck Findings:  Neck ROM: Normal ROM      Dental:  Dental Findings: In tact   Chest/Lungs:  Chest/Lungs Findings: Normal Respiratory Rate, Clear to auscultation     Heart/Vascular:  Heart Findings: Rate: Normal  Rhythm:  Regular Rhythm        Mental Status:  Mental Status Findings:  Alert and Oriented, Cooperative         Anesthesia Plan  Type of Anesthesia, risks & benefits discussed:  Anesthesia Type:  epidural  Patient's Preference:   Intra-op Monitoring Plan:   Intra-op Monitoring Plan Comments:   Post Op Pain Control Plan:   Post Op Pain Control Plan Comments:   Induction:    Beta Blocker:  Patient is not currently on a Beta-Blocker (No further documentation required).       Informed Consent: Patient understands risks and agrees with Anesthesia plan.  Questions answered. Anesthesia consent signed with patient.  ASA Score: 2     Day of Surgery Review of History & Physical:    H&P update referred to the provider.     Anesthesia Plan Notes:           Ready For Surgery From Anesthesia Perspective.

## 2017-06-07 NOTE — PROGRESS NOTES
"LABOR NOTE    S:  Complaints: Feels urge to push.  Epidural working:  not applicable      O: BP (!) 154/78   Pulse 93   Temp 97.7 °F (36.5 °C)   Resp 18   Ht 5' 1" (1.549 m)   Wt 101.6 kg (224 lb)   LMP 2016   SpO2 (!) 94%   Breastfeeding? No   BMI 42.32 kg/m²       FHT: 130 bpm Cat 1 (reassuring)  CTX: q 2 minutes  SVE: 7/80/-1    S/p AROM and IUPC placement    ASSESSMENT:   26 y.o.  at 36w0d, IOL for preE w/ SF    FHT reassuring    Active Hospital Problems    Diagnosis  POA    Severe pre-eclampsia in third trimester [O14.13]  Yes    Late prenatal care [O09.30]  Not Applicable    Pregnancy with one fetus/bottle/btl papers signed 17 [Z34.90]  Not Applicable    History of gestational hypertension - prior pregnancy [Z87.59]  Not Applicable    Choroid plexus cyst of fetus affecting management of mother in cabrera pregnancy, antepartum [O35.0XX0]  Yes    Echogenic focus of bowel of fetus affecting management of mother in cabrera pregnancy, antepartum [O35.8XX0]  Yes      Resolved Hospital Problems    Diagnosis Date Resolved POA   No resolved problems to display.         PLAN:    Continue Close Maternal/Fetal Monitoring  Pitocin Augmentation per protocol, currently at 14  Recheck 2 hours or PRN    Linda Angel MD  OB/GYN, PGY-1      "

## 2017-06-07 NOTE — PLAN OF CARE
Problem: Patient Care Overview  Goal: Plan of Care Review  Plan of care reviewed with pt. All questions were answered. Made available throughout the shift to answer any additional questions. Pt indxn for gest. Htn/pre e. 1 dose of vaginal cytotec. Cooks cathether inserted at 0630. SVE 2/60/-3. Pit started. Plan to continue with pit and cooks catheter. Will update POC as needed.

## 2017-06-07 NOTE — MEDICAL/APP STUDENT
"MAG NOTE    Ms. Macie Guzman is a 26 y.o.  female at 36w0d, admitted for Pre-E with SF on magnesium sulfate for seizure prophylaxis     Patient denies headaches, denies blurry vision and denies right upper quadrant pain. denies CP, denies SOB. Patient reports no nausea/no vomiting.     Objective:       BP (!) 155/92   Pulse 84   Temp 97.9 °F (36.6 °C)   Resp 18   Ht 5' 1" (1.549 m)   Wt 101.6 kg (224 lb)   LMP 2016   SpO2 99%   Breastfeeding? No   BMI 42.32 kg/m²   Vitals:    17 0156 17 0203 17 0245 17 0319   BP:   134/79 (!) 155/92   BP Method:       Pulse: 94  96 84   Resp:       Temp:  97.9 °F (36.6 °C)     TempSrc:       SpO2: 97%  100% 99%   Weight:       Height:             No intake/output data recorded.    No intake/output data recorded.           General:   alert, appears stated age and cooperative   Lungs:   clear to auscultation bilaterally   Heart:   regular rate and rhythm, S1, S2 normal, no murmur, click, rub or gallop   Abdomen:  soft, non-tender; bowel sounds normal; no masses,  no organomegaly   Uterine Size:  firm located at the umblicus.    Extremities: peripheral pulses normal, no pedal edema, no clubbing or cyanosis   Reflexes - 2+  bilaterally     SVE: Cl/50/-3    FHTs: 140, Cat 1, reassuring    Lab Review  Recent Results (from the past 48 hour(s))   CBC with Auto Differential    Collection Time: 17  1:10 AM   Result Value Ref Range    WBC 12.25 3.90 - 12.70 K/uL    RBC 4.16 4.00 - 5.40 M/uL    Hemoglobin 11.9 (L) 12.0 - 16.0 g/dL    Hematocrit 34.8 (L) 37.0 - 48.5 %    MCV 84 82 - 98 fL    MCH 28.6 27.0 - 31.0 pg    MCHC 34.2 32.0 - 36.0 %    RDW 13.0 11.5 - 14.5 %    Platelets 228 150 - 350 K/uL    MPV 10.9 9.2 - 12.9 fL    Gran # 9.3 (H) 1.8 - 7.7 K/uL    Lymph # 1.9 1.0 - 4.8 K/uL    Mono # 0.9 0.3 - 1.0 K/uL    Eos # 0.1 0.0 - 0.5 K/uL    Baso # 0.01 0.00 - 0.20 K/uL    Gran% 76.1 (H) 38.0 - 73.0 %    Lymph% 15.4 (L) 18.0 - 48.0 %    " Mono% 7.3 4.0 - 15.0 %    Eosinophil% 0.4 0.0 - 8.0 %    Basophil% 0.1 0.0 - 1.9 %    Differential Method Automated    Type & Screen    Collection Time: 17  1:10 AM   Result Value Ref Range    Group & Rh O POS     Indirect Martha NEG    Comprehensive metabolic panel    Collection Time: 17  1:10 AM   Result Value Ref Range    Sodium 138 136 - 145 mmol/L    Potassium 3.4 (L) 3.5 - 5.1 mmol/L    Chloride 107 95 - 110 mmol/L    CO2 22 (L) 23 - 29 mmol/L    Glucose 79 70 - 110 mg/dL    BUN, Bld 7 6 - 20 mg/dL    Creatinine 0.7 0.5 - 1.4 mg/dL    Calcium 8.6 (L) 8.7 - 10.5 mg/dL    Total Protein 6.7 6.0 - 8.4 g/dL    Albumin 3.0 (L) 3.5 - 5.2 g/dL    Total Bilirubin 0.3 0.1 - 1.0 mg/dL    Alkaline Phosphatase 114 55 - 135 U/L    AST 10 10 - 40 U/L    ALT 8 (L) 10 - 44 U/L    Anion Gap 9 8 - 16 mmol/L    eGFR if African American >60 >60 mL/min/1.73 m^2    eGFR if non African American >60 >60 mL/min/1.73 m^2   Lactate dehydrogenase    Collection Time: 17  1:10 AM   Result Value Ref Range     110 - 260 U/L   Rapid HIV    Collection Time: 17  1:10 AM   Result Value Ref Range    HIV Rapid Testing Negative Negative   Protein / creatinine ratio, urine    Collection Time: 17  1:49 AM   Result Value Ref Range    Protein, Urine Random 21 (H) 0 - 15 mg/dL    Creatinine, Random Ur 130.4 15.0 - 325.0 mg/dL    Prot/Creat Ratio, Ur 0.16 0.00 - 0.20          Assessment:     26 y.o.  female at 36w0d, admitted for Pre-E with SF on magnesium sulfate for seizure prophylaxis     Active Hospital Problems    Diagnosis  POA    Severe pre-eclampsia in third trimester [O14.13]  Yes    Late prenatal care [O09.30]  Not Applicable    Pregnancy with one fetus/bottle/btl papers signed 17 [Z34.90]  Not Applicable    History of gestational hypertension - prior pregnancy [Z87.59]  Not Applicable    Choroid plexus cyst of fetus affecting management of mother in cabrera pregnancy, antepartum [O35.0XX0]   Yes    Echogenic focus of bowel of fetus affecting management of mother in cabrera pregnancy, antepartum [O35.8XX0]  Yes      Resolved Hospital Problems    Diagnosis Date Resolved POA   No resolved problems to display.        Plan:   1. Continue magnesium sulfate, no signs of toxicity at this time  2. Monitor for s/s of eclampsia  3. Close maternal monitoring including UOP and BP,    UOP: 2L over the last 4 hours    BP: Over the last 3 hours: (130-155)/(66-92); 155/92  4. Recheck in 2-4 hours or PRN  5. Intrapartum - ***

## 2017-06-08 LAB
BASOPHILS # BLD AUTO: 0.01 K/UL
BASOPHILS NFR BLD: 0 %
DIFFERENTIAL METHOD: ABNORMAL
EOSINOPHIL # BLD AUTO: 0 K/UL
EOSINOPHIL NFR BLD: 0 %
ERYTHROCYTE [DISTWIDTH] IN BLOOD BY AUTOMATED COUNT: 13.2 %
HCT VFR BLD AUTO: 35 %
HGB BLD-MCNC: 12 G/DL
LYMPHOCYTES # BLD AUTO: 1.8 K/UL
LYMPHOCYTES NFR BLD: 8.4 %
MCH RBC QN AUTO: 28.4 PG
MCHC RBC AUTO-ENTMCNC: 34.3 %
MCV RBC AUTO: 83 FL
MONOCYTES # BLD AUTO: 1.8 K/UL
MONOCYTES NFR BLD: 8.7 %
NEUTROPHILS # BLD AUTO: 17.4 K/UL
NEUTROPHILS NFR BLD: 82.4 %
PLATELET # BLD AUTO: 261 K/UL
PMV BLD AUTO: 11 FL
RBC # BLD AUTO: 4.22 M/UL
WBC # BLD AUTO: 21.14 K/UL

## 2017-06-08 PROCEDURE — 11000001 HC ACUTE MED/SURG PRIVATE ROOM

## 2017-06-08 PROCEDURE — 85025 COMPLETE CBC W/AUTO DIFF WBC: CPT

## 2017-06-08 PROCEDURE — 25000003 PHARM REV CODE 250: Performed by: OBSTETRICS & GYNECOLOGY

## 2017-06-08 PROCEDURE — 36415 COLL VENOUS BLD VENIPUNCTURE: CPT

## 2017-06-08 RX ADMIN — OXYCODONE HYDROCHLORIDE AND ACETAMINOPHEN 1 TABLET: 5; 325 TABLET ORAL at 04:06

## 2017-06-08 RX ADMIN — IBUPROFEN 600 MG: 600 TABLET, FILM COATED ORAL at 04:06

## 2017-06-08 NOTE — MEDICAL/APP STUDENT
Magnesium Assessment Note    Subjective:         Macie Guzman is a 26 y.o. female at 36w0d on IV MgSO4 for seizure prophylaxis.    Patient denies headaches, denies blurry vision and denies right upper quadrant pain. Denies CP, denies SOB. Patient reports no nausea/no vomiting.     Objective:      Temp:  [97.2 °F (36.2 °C)-97.9 °F (36.6 °C)] 97.6 °F (36.4 °C)  Pulse:  [] 70  Resp:  [18] 18  SpO2:  [89 %-100 %] 100 %  BP: (125-167)/() 131/75    I/O last 3 completed shifts:  In: 1615.8 [I.V.:1615.8]  Out: 6220 [Urine:6070; Blood:150]  I/O this shift:  In: 200 [I.V.:200]  Out: 975 [Urine:975]    General:   alert, appears stated age and cooperative   Lungs:   clear to auscultation bilaterally   Heart::   regular rate and rhythm, S1, S2 normal, no murmur, click, rub or gallop   Extremities: peripheral pulses normal, no pedal edema, no clubbing or cyanosis   DTRs- 2+  bilaterally       Lab Review  Recent Results (from the past 24 hour(s))   CBC with Auto Differential    Collection Time: 06/07/17  1:10 AM   Result Value Ref Range    WBC 12.25 3.90 - 12.70 K/uL    RBC 4.16 4.00 - 5.40 M/uL    Hemoglobin 11.9 (L) 12.0 - 16.0 g/dL    Hematocrit 34.8 (L) 37.0 - 48.5 %    MCV 84 82 - 98 fL    MCH 28.6 27.0 - 31.0 pg    MCHC 34.2 32.0 - 36.0 %    RDW 13.0 11.5 - 14.5 %    Platelets 228 150 - 350 K/uL    MPV 10.9 9.2 - 12.9 fL    Gran # 9.3 (H) 1.8 - 7.7 K/uL    Lymph # 1.9 1.0 - 4.8 K/uL    Mono # 0.9 0.3 - 1.0 K/uL    Eos # 0.1 0.0 - 0.5 K/uL    Baso # 0.01 0.00 - 0.20 K/uL    Gran% 76.1 (H) 38.0 - 73.0 %    Lymph% 15.4 (L) 18.0 - 48.0 %    Mono% 7.3 4.0 - 15.0 %    Eosinophil% 0.4 0.0 - 8.0 %    Basophil% 0.1 0.0 - 1.9 %    Differential Method Automated    Type & Screen    Collection Time: 06/07/17  1:10 AM   Result Value Ref Range    Group & Rh O POS     Indirect Martha NEG    Comprehensive metabolic panel    Collection Time: 06/07/17  1:10 AM   Result Value Ref Range    Sodium 138 136 - 145 mmol/L    Potassium  3.4 (L) 3.5 - 5.1 mmol/L    Chloride 107 95 - 110 mmol/L    CO2 22 (L) 23 - 29 mmol/L    Glucose 79 70 - 110 mg/dL    BUN, Bld 7 6 - 20 mg/dL    Creatinine 0.7 0.5 - 1.4 mg/dL    Calcium 8.6 (L) 8.7 - 10.5 mg/dL    Total Protein 6.7 6.0 - 8.4 g/dL    Albumin 3.0 (L) 3.5 - 5.2 g/dL    Total Bilirubin 0.3 0.1 - 1.0 mg/dL    Alkaline Phosphatase 114 55 - 135 U/L    AST 10 10 - 40 U/L    ALT 8 (L) 10 - 44 U/L    Anion Gap 9 8 - 16 mmol/L    eGFR if African American >60 >60 mL/min/1.73 m^2    eGFR if non African American >60 >60 mL/min/1.73 m^2   Lactate dehydrogenase    Collection Time: 17  1:10 AM   Result Value Ref Range     110 - 260 U/L   HIV-1 and HIV-2 antibodies    Collection Time: 17  1:10 AM   Result Value Ref Range    HIV 1/2 Ag/Ab Negative Negative   RPR    Collection Time: 17  1:10 AM   Result Value Ref Range    RPR Non-reactive Non-reactive   Rapid HIV    Collection Time: 17  1:10 AM   Result Value Ref Range    HIV Rapid Testing Negative Negative   Protein / creatinine ratio, urine    Collection Time: 17  1:49 AM   Result Value Ref Range    Protein, Urine Random 21 (H) 0 - 15 mg/dL    Creatinine, Random Ur 130.4 15.0 - 325.0 mg/dL    Prot/Creat Ratio, Ur 0.16 0.00 - 0.20   ISTAT PROCEDURE    Collection Time: 17  3:25 PM   Result Value Ref Range    POC PH 7.286 (L) 7.35 - 7.45    POC PCO2 52.2 (H) 35 - 45 mmHg    POC PO2 18 (L) 80 - 100 mmHg    POC HCO3 24.9 24 - 28 mmol/L    POC BE -2 -2 to 2 mmol/L    POC SATURATED O2 22 (L) 95 - 100 %    Sample CRD V     Site Other     Allens Test N/A         Assessment:     26 y.o.  female at 36w0d, on IV magnesium sulfate.    Active Hospital Problems    Diagnosis  POA    * (spontaneous vaginal delivery) [O80]  Not Applicable    Severe pre-eclampsia in third trimester [O14.13]  Yes    Late prenatal care [O09.30]  Not Applicable    Pregnancy with one fetus/bottle/btl papers signed 17 [Z34.90]  Not Applicable     History of gestational hypertension - prior pregnancy [Z87.59]  Not Applicable    Choroid plexus cyst of fetus affecting management of mother in cabrera pregnancy, antepartum [O35.0XX0]  Yes    Echogenic focus of bowel of fetus affecting management of mother in cabrera pregnancy, antepartum [O35.8XX0]  Yes      Resolved Hospital Problems    Diagnosis Date Resolved POA   No resolved problems to display.        Plan:     - Continue magnesium sulfate, no signs of toxicity at this time  - Close maternal monitoring including UOP and BP   Urine output over the last 4 hours averaged 243 mL/h   BP range over the last 4 hours of (131-149)/(75-90) 149/90  - Recheck in 2-4 hours or PRN      Ricco Bear

## 2017-06-08 NOTE — PLAN OF CARE
06/08/17 0944   ED Admissions Case Approval   ED Admissions Case Approval (!) CM Approved  (IP )

## 2017-06-08 NOTE — PROGRESS NOTES
[facility]  Obstetrics  Postpartum Progress Note    Patient Name: Macie Guzman  MRN: [id number]  Admission Date: 2017  Hospital Length of Stay: 1 days  Attending Physician: Jovanni Cloud IV, MD  Primary Care Provider: Esther Thurman MD    Subjective:     Principal Problem: (spontaneous vaginal delivery)    Interval History:  Macie Guzman is a 26 y.o. female status post Spontaneous vaginal delivery on 17 03:00 PM  at 36w0d. Complicated by PreE w/ SF (BP/HA), on magnesium sulfate for seizure ppx.     Patient is doing well today. She denies  nausea, vomiting, fever or chills, HA, vision changes, SOB, RUQ pain. Patient reports mild abdominal pain that is well relieved by oral pain medications. Vaginal bleeding is light. Patient is not ambulating of voiding yet as saunders in place and on mag. She has passed flatus, and has not had BM.     Patient is breastfeeding, pumping for infant in NICU.      Objective:     Vital Signs (Most Recent):  Temp: 97.1 °F (36.2 °C) (17 0409)  Pulse: 61 (17 0540)  Resp: 18 (17 0327)  BP: (!) 143/76 (17 0534)  SpO2: 99 % (17 0540) Vital Signs (24h Range):  Temp:  [97.1 °F (36.2 °C)-97.7 °F (36.5 °C)] 97.1 °F (36.2 °C)  Pulse:  [] 61  Resp:  [18] 18  SpO2:  [89 %-100 %] 99 %  BP: (124-167)/() 143/76     Weight: 101.6 kg (224 lb)  Body mass index is 42.32 kg/m².      Intake/Output Summary (Last 24 hours) at 17 0709  Last data filed at 17 0500   Gross per 24 hour   Intake             1475 ml   Output             5935 ml   Net            -4460 ml       Significant Labs:  Lab Results   Component Value Date    GROUPTRH O POS 2017    HEPBSAG Negative 2017    STREPBCULT No Group B Streptococcus isolated 2014       Recent Labs  Lab 17  0110   HGB 11.9*   HCT 34.8*       Physical Exam:   Constitutional: She is oriented to person, place, and time. She appears well-developed and  well-nourished. No distress.     Eyes: EOM are normal.     Cardiovascular: Normal rate, regular rhythm and normal heart sounds.     Pulmonary/Chest: Effort normal and breath sounds normal. No respiratory distress. She has no wheezes. She has no rales.        Abdominal: Soft. She exhibits no distension. There is no tenderness.   Fundus firm, non-tender, and at the umbilicus             Musculoskeletal: Moves all extremeties. She exhibits edema (1+).   SCDs in place       Neurological: She is alert and oriented to person, place, and time. She has normal reflexes.    Skin: Skin is warm and dry.    Psychiatric: She has a normal mood and affect. Her behavior is normal. Thought content normal.       Assessment/Plan:     26 y.o. female  for:    *  (spontaneous vaginal delivery)    Postpartum care:  - Patient doing well. Continue routine management and advances.  - Continue PO pain meds. Pain well controlled.  - Heme: H/h 12/35 >pending. VSS, asymptomatic.  - Encourage ambulation  - Circumcision : defer to NICU  - Contraception: defer to postpartum visit   - Lactation : breastfeeding, pumping for infant in NICU, consult lactation prn   - Rh pos        Severe pre-eclampsia in third trimester    - on magnesium, no s/s of toxicity at this time  - BP normal to mild range   BP: (124-167)/() 143/76  - UOP more than adequate, 1.9L overnight  - asymptomatic  - meets short mag protocol, discontinue mag now  - remove saunders, continue strict I/Os  - monitor BP and for recurrence of symptoms              Disposition: As patient meets milestones, will plan to discharge PPD#2.    Fabiola Naik MD  Obstetrics  [facility]

## 2017-06-08 NOTE — PROGRESS NOTES
Magnesium Assessment Note     Subjective:       Macie Guzman is a 26 y.o. now ppd#0 s/p  on IV MgSO4 for seizure prophylaxis.     Patient denies headaches, denies blurry vision and denies right upper quadrant pain. Denies CP, denies SOB. Patient reports no nausea/no vomiting.      Objective:      Temp:  [97.2 °F (36.2 °C)-97.9 °F (36.6 °C)] 97.6 °F (36.4 °C)  Pulse:  [] 70  Resp:  [18] 18  SpO2:  [89 %-100 %] 100 %  BP: (125-167)/() 131/75     I/O last 3 completed shifts:  In: 1615.8 [I.V.:1615.8]  Out: 6220 [Urine:6070; Blood:150]  I/O this shift:  In: 200 [I.V.:200]  Out: 975 [Urine:975]           General:   alert, appears stated age and cooperative    Lungs:   clear to auscultation bilaterally    Heart::   regular rate and rhythm, S1, S2 normal, no murmur, click, rub or gallop    Extremities: peripheral pulses normal, no pedal edema, no clubbing or cyanosis   DTRs- 2+  bilaterally         Lab Review   Recent Results          Recent Results (from the past 24 hour(s))   CBC with Auto Differential     Collection Time: 17  1:10 AM   Result Value Ref Range     WBC 12.25 3.90 - 12.70 K/uL     RBC 4.16 4.00 - 5.40 M/uL     Hemoglobin 11.9 (L) 12.0 - 16.0 g/dL     Hematocrit 34.8 (L) 37.0 - 48.5 %     MCV 84 82 - 98 fL     MCH 28.6 27.0 - 31.0 pg     MCHC 34.2 32.0 - 36.0 %     RDW 13.0 11.5 - 14.5 %     Platelets 228 150 - 350 K/uL     MPV 10.9 9.2 - 12.9 fL     Gran # 9.3 (H) 1.8 - 7.7 K/uL     Lymph # 1.9 1.0 - 4.8 K/uL     Mono # 0.9 0.3 - 1.0 K/uL     Eos # 0.1 0.0 - 0.5 K/uL     Baso # 0.01 0.00 - 0.20 K/uL     Gran% 76.1 (H) 38.0 - 73.0 %     Lymph% 15.4 (L) 18.0 - 48.0 %     Mono% 7.3 4.0 - 15.0 %     Eosinophil% 0.4 0.0 - 8.0 %     Basophil% 0.1 0.0 - 1.9 %     Differential Method Automated     Type & Screen     Collection Time: 17  1:10 AM   Result Value Ref Range     Group & Rh O POS       Indirect Martha NEG     Comprehensive metabolic panel     Collection Time: 17  1:10 AM    Result Value Ref Range     Sodium 138 136 - 145 mmol/L     Potassium 3.4 (L) 3.5 - 5.1 mmol/L     Chloride 107 95 - 110 mmol/L     CO2 22 (L) 23 - 29 mmol/L     Glucose 79 70 - 110 mg/dL     BUN, Bld 7 6 - 20 mg/dL     Creatinine 0.7 0.5 - 1.4 mg/dL     Calcium 8.6 (L) 8.7 - 10.5 mg/dL     Total Protein 6.7 6.0 - 8.4 g/dL     Albumin 3.0 (L) 3.5 - 5.2 g/dL     Total Bilirubin 0.3 0.1 - 1.0 mg/dL     Alkaline Phosphatase 114 55 - 135 U/L     AST 10 10 - 40 U/L     ALT 8 (L) 10 - 44 U/L     Anion Gap 9 8 - 16 mmol/L     eGFR if African American >60 >60 mL/min/1.73 m^2     eGFR if non African American >60 >60 mL/min/1.73 m^2   Lactate dehydrogenase     Collection Time: 17  1:10 AM   Result Value Ref Range      110 - 260 U/L   HIV-1 and HIV-2 antibodies     Collection Time: 17  1:10 AM   Result Value Ref Range     HIV 1/2 Ag/Ab Negative Negative   RPR     Collection Time: 17  1:10 AM   Result Value Ref Range     RPR Non-reactive Non-reactive   Rapid HIV     Collection Time: 17  1:10 AM   Result Value Ref Range     HIV Rapid Testing Negative Negative   Protein / creatinine ratio, urine     Collection Time: 17  1:49 AM   Result Value Ref Range     Protein, Urine Random 21 (H) 0 - 15 mg/dL     Creatinine, Random Ur 130.4 15.0 - 325.0 mg/dL     Prot/Creat Ratio, Ur 0.16 0.00 - 0.20   ISTAT PROCEDURE     Collection Time: 17  3:25 PM   Result Value Ref Range     POC PH 7.286 (L) 7.35 - 7.45     POC PCO2 52.2 (H) 35 - 45 mmHg     POC PO2 18 (L) 80 - 100 mmHg     POC HCO3 24.9 24 - 28 mmol/L     POC BE -2 -2 to 2 mmol/L     POC SATURATED O2 22 (L) 95 - 100 %     Sample CRD V       Site Other       Allens Test N/A              Assessment:      26 y.o.  female at 36w0d, on IV magnesium sulfate.           Active Hospital Problems     Diagnosis   POA    * (spontaneous vaginal delivery) [O80]   Not Applicable    Severe pre-eclampsia in third trimester [O14.13]   Yes    Late  prenatal care [O09.30]   Not Applicable    Pregnancy with one fetus/bottle/btl papers signed 4/26/17 [Z34.90]   Not Applicable    History of gestational hypertension - prior pregnancy [Z87.59]   Not Applicable    Choroid plexus cyst of fetus affecting management of mother in cabrera pregnancy, antepartum [O35.0XX0]   Yes    Echogenic focus of bowel of fetus affecting management of mother in cabrera pregnancy, antepartum [O35.8XX0]   Yes       Resolved Hospital Problems     Diagnosis Date Resolved POA   No resolved problems to display.         Plan:      - Continue magnesium sulfate, no signs of toxicity at this time  - Close maternal monitoring including UOP and BP                        Urine output over the last 4 hours averaged 243 mL/h                        BP range over the last 4 hours of (131-149)/(75-90) 149/90  - Recheck in 4 hours or PRN    Sheldon Good MD   PGY-2 Ob-gyn

## 2017-06-08 NOTE — PROGRESS NOTES
"MAG NOTE     Ms. Macie Guzman is a 26 y.o. female, PPD#0 who was admitted for Pre-E with SF on magnesium sulfate for seizure prophylaxis.      Patient denies headaches, denies blurry vision and denies right upper quadrant pain. denies CP, denies SOB. Patient reports no nausea/no vomiting.      Objective:       BP (!) 140/90   Pulse 76   Temp 97.6 °F (36.4 °C)   Resp 18   Ht 5' 1" (1.549 m)   Wt 101.6 kg (224 lb)   LMP 2016   SpO2 (!) 93%   Breastfeeding? No   BMI 42.32 kg/m²    Vitals           Vitals:     17 1758 17 1801 17 1806 17 1807   BP:           BP Method:           Pulse: 98 76 75 76   Resp:           Temp:           TempSrc:           SpO2: (!) 94% 100% 97% (!) 93%   Weight:           Height:                       I/O last 3 completed shifts:  In: 1615.8 [I.V.:1615.8]  Out: 5945 [Urine:5795; Blood:150]     No intake/output data recorded.                Date 17 0700 - 17 0659   Shift 2580-0491 5547-4513 2724-1259 24 Hour Total   I  N  T  A  K  E  I.V.  (mL/kg)   1275  (12.5)   1275  (12.5)    Shift Total  (mL/kg)   1275  (12.5)   1275  (12.5)   O  U  T  P  U  T  Urine  (mL/kg/hr) 2960  (3.6) 585   3545    Blood   150   150    Shift Total  (mL/kg) 2960  (29.1) 735  (7.2)   3695  (36.4)   Weight (kg) 101.6 101.6 101.6 101.6                  General:   alert, appears stated age and cooperative    Lungs:   clear to auscultation bilaterally    Heart:   regular rate and rhythm, S1, S2 normal, no murmur, click, rub or gallop    Abdomen:  soft, non-tender; bowel sounds normal; no masses,  no organomegaly    Uterine Size:  firm located at the umblicus.    Extremities: peripheral pulses normal, no pedal edema, no clubbing or cyanosis   Reflexes - 2+  bilaterally            Lab Review   Recent Results          Recent Results (from the past 48 hour(s))   CBC with Auto Differential     Collection Time: 17  1:10 AM   Result Value Ref Range     WBC 12.25 " 3.90 - 12.70 K/uL     RBC 4.16 4.00 - 5.40 M/uL     Hemoglobin 11.9 (L) 12.0 - 16.0 g/dL     Hematocrit 34.8 (L) 37.0 - 48.5 %     MCV 84 82 - 98 fL     MCH 28.6 27.0 - 31.0 pg     MCHC 34.2 32.0 - 36.0 %     RDW 13.0 11.5 - 14.5 %     Platelets 228 150 - 350 K/uL     MPV 10.9 9.2 - 12.9 fL     Gran # 9.3 (H) 1.8 - 7.7 K/uL     Lymph # 1.9 1.0 - 4.8 K/uL     Mono # 0.9 0.3 - 1.0 K/uL     Eos # 0.1 0.0 - 0.5 K/uL     Baso # 0.01 0.00 - 0.20 K/uL     Gran% 76.1 (H) 38.0 - 73.0 %     Lymph% 15.4 (L) 18.0 - 48.0 %     Mono% 7.3 4.0 - 15.0 %     Eosinophil% 0.4 0.0 - 8.0 %     Basophil% 0.1 0.0 - 1.9 %     Differential Method Automated     Type & Screen     Collection Time: 06/07/17  1:10 AM   Result Value Ref Range     Group & Rh O POS       Indirect Martha NEG     Comprehensive metabolic panel     Collection Time: 06/07/17  1:10 AM   Result Value Ref Range     Sodium 138 136 - 145 mmol/L     Potassium 3.4 (L) 3.5 - 5.1 mmol/L     Chloride 107 95 - 110 mmol/L     CO2 22 (L) 23 - 29 mmol/L     Glucose 79 70 - 110 mg/dL     BUN, Bld 7 6 - 20 mg/dL     Creatinine 0.7 0.5 - 1.4 mg/dL     Calcium 8.6 (L) 8.7 - 10.5 mg/dL     Total Protein 6.7 6.0 - 8.4 g/dL     Albumin 3.0 (L) 3.5 - 5.2 g/dL     Total Bilirubin 0.3 0.1 - 1.0 mg/dL     Alkaline Phosphatase 114 55 - 135 U/L     AST 10 10 - 40 U/L     ALT 8 (L) 10 - 44 U/L     Anion Gap 9 8 - 16 mmol/L     eGFR if African American >60 >60 mL/min/1.73 m^2     eGFR if non African American >60 >60 mL/min/1.73 m^2   Lactate dehydrogenase     Collection Time: 06/07/17  1:10 AM   Result Value Ref Range      110 - 260 U/L   HIV-1 and HIV-2 antibodies     Collection Time: 06/07/17  1:10 AM   Result Value Ref Range     HIV 1/2 Ag/Ab Negative Negative   RPR     Collection Time: 06/07/17  1:10 AM   Result Value Ref Range     RPR Non-reactive Non-reactive   Rapid HIV     Collection Time: 06/07/17  1:10 AM   Result Value Ref Range     HIV Rapid Testing Negative Negative   Protein /  creatinine ratio, urine     Collection Time: 17  1:49 AM   Result Value Ref Range     Protein, Urine Random 21 (H) 0 - 15 mg/dL     Creatinine, Random Ur 130.4 15.0 - 325.0 mg/dL     Prot/Creat Ratio, Ur 0.16 0.00 - 0.20   ISTAT PROCEDURE     Collection Time: 17  3:25 PM   Result Value Ref Range     POC PH 7.286 (L) 7.35 - 7.45     POC PCO2 52.2 (H) 35 - 45 mmHg     POC PO2 18 (L) 80 - 100 mmHg     POC HCO3 24.9 24 - 28 mmol/L     POC BE -2 -2 to 2 mmol/L     POC SATURATED O2 22 (L) 95 - 100 %     Sample CRD V       Site Other       Allens Test N/A                 Assessment:      26 y.o. female admitted for Pre-E with SF on magnesium sulfate for seizure prophylaxis            Active Hospital Problems     Diagnosis   POA    * (spontaneous vaginal delivery) [O80]   Not Applicable    Severe pre-eclampsia in third trimester [O14.13]   Yes    Late prenatal care [O09.30]   Not Applicable    Pregnancy with one fetus/bottle/btl papers signed 17 [Z34.90]   Not Applicable    History of gestational hypertension - prior pregnancy [Z87.59]   Not Applicable    Choroid plexus cyst of fetus affecting management of mother in cabrera pregnancy, antepartum [O35.0XX0]   Yes    Echogenic focus of bowel of fetus affecting management of mother in cabrera pregnancy, antepartum [O35.8XX0]   Yes       Resolved Hospital Problems     Diagnosis Date Resolved POA   No resolved problems to display.         Plan:   1. Continue magnesium sulfate, no signs of toxicity at this time  2. Monitor for s/s of eclampsia  3. Close maternal monitoring including UOP and BP                        UOP: Avg. of 195 cc over last 3 hours                        BP: Last was 140/90 at 1730  4. Recheck in 4 hours or PRN

## 2017-06-08 NOTE — ASSESSMENT & PLAN NOTE
Postpartum care:  - Patient doing well. Continue routine management and advances.  - Continue PO pain meds. Pain well controlled.  - Heme: H/h 12/35 >pending. VSS, asymptomatic.  - Encourage ambulation  - Circumcision : defer to NICU  - Contraception: defer to postpartum visit   - Lactation : breastfeeding, pumping for infant in NICU, consult lactation prn   - Rh pos

## 2017-06-08 NOTE — ASSESSMENT & PLAN NOTE
- on magnesium, no s/s of toxicity at this time  - BP normal to mild range   BP: (124-167)/() 143/76  - UOP more than adequate, 1.9L overnight  - asymptomatic  - meets short mag protocol, discontinue mag now  - remove saunders, continue strict I/Os  - monitor BP and for recurrence of symptoms

## 2017-06-08 NOTE — SUBJECTIVE & OBJECTIVE
Interval History:  Macie Guzman is a 26 y.o. female status post Spontaneous vaginal delivery on 6/7/17 03:00 PM  at 36w0d. Complicated by PreE w/ SF (BP/HA), on magnesium sulfate for seizure ppx.     Patient is doing well today. She denies  nausea, vomiting, fever or chills, HA, vision changes, SOB, RUQ pain. Patient reports mild abdominal pain that is well relieved by oral pain medications. Vaginal bleeding is light. Patient is not ambulating of voiding yet as saunders in place and on mag. She has passed flatus, and has not had BM.     Patient is breastfeeding, pumping for infant in NICU.      Objective:     Vital Signs (Most Recent):  Temp: 97.1 °F (36.2 °C) (06/08/17 0409)  Pulse: 61 (06/08/17 0540)  Resp: 18 (06/08/17 0327)  BP: (!) 143/76 (06/08/17 0534)  SpO2: 99 % (06/08/17 0540) Vital Signs (24h Range):  Temp:  [97.1 °F (36.2 °C)-97.7 °F (36.5 °C)] 97.1 °F (36.2 °C)  Pulse:  [] 61  Resp:  [18] 18  SpO2:  [89 %-100 %] 99 %  BP: (124-167)/() 143/76     Weight: 101.6 kg (224 lb)  Body mass index is 42.32 kg/m².      Intake/Output Summary (Last 24 hours) at 06/08/17 0709  Last data filed at 06/08/17 0500   Gross per 24 hour   Intake             1475 ml   Output             5935 ml   Net            -4460 ml       Significant Labs:  Lab Results   Component Value Date    GROUPTRH O POS 06/07/2017    HEPBSAG Negative 03/08/2017    STREPBCULT No Group B Streptococcus isolated 09/22/2014       Recent Labs  Lab 06/07/17  0110   HGB 11.9*   HCT 34.8*       Physical Exam:   Constitutional: She is oriented to person, place, and time. She appears well-developed and well-nourished. No distress.     Eyes: EOM are normal.     Cardiovascular: Normal rate, regular rhythm and normal heart sounds.     Pulmonary/Chest: Effort normal and breath sounds normal. No respiratory distress. She has no wheezes. She has no rales.        Abdominal: Soft. She exhibits no distension. There is no tenderness.   Fundus firm, non-tender,  and at the umbilicus             Musculoskeletal: Moves all extremeties. She exhibits edema (1+).   SCDs in place       Neurological: She is alert and oriented to person, place, and time. She has normal reflexes.    Skin: Skin is warm and dry.    Psychiatric: She has a normal mood and affect. Her behavior is normal. Thought content normal.

## 2017-06-08 NOTE — MEDICAL/APP STUDENT
Magnesium Assessment Note    Subjective:         Macie Guzman is a 26 y.o. female at 36w0d on IV MgSO4 for seizure prophylaxis.    Patient denies headaches, denies blurry vision and denies right upper quadrant pain. Denies CP, denies SOB. Patient reports no nausea/no vomiting.     Objective:      Temp:  [97.2 °F (36.2 °C)-97.7 °F (36.5 °C)] 97.6 °F (36.4 °C)  Pulse:  [] 73  Resp:  [18] 18  SpO2:  [89 %-100 %] 100 %  BP: (125-167)/() 128/72    I/O last 3 completed shifts:  In: 1615.8 [I.V.:1615.8]  Out: 6220 [Urine:6070; Blood:150]  I/O this shift:  In: 200 [I.V.:200]  Out: 1265 [Urine:1265]    General:   alert, appears stated age and cooperative   Lungs:   clear to auscultation bilaterally   Heart::   regular rate and rhythm, S1, S2 normal, no murmur, click, rub or gallop   Extremities: peripheral pulses normal, no pedal edema, no clubbing or cyanosis   DTRs- 1+  bilaterally     Lab Review  Recent Results (from the past 24 hour(s))   ISTAT PROCEDURE    Collection Time: 17  3:25 PM   Result Value Ref Range    POC PH 7.286 (L) 7.35 - 7.45    POC PCO2 52.2 (H) 35 - 45 mmHg    POC PO2 18 (L) 80 - 100 mmHg    POC HCO3 24.9 24 - 28 mmol/L    POC BE -2 -2 to 2 mmol/L    POC SATURATED O2 22 (L) 95 - 100 %    Sample CRD V     Site Other     Allens Test N/A         Assessment:     26 y.o.  female at 36w0d, on IV magnesium sulfate.    Active Hospital Problems    Diagnosis  POA    * (spontaneous vaginal delivery) [O80]  Not Applicable    Severe pre-eclampsia in third trimester [O14.13]  Yes    Late prenatal care [O09.30]  Not Applicable    Pregnancy with one fetus/bottle/btl papers signed 17 [Z34.90]  Not Applicable    History of gestational hypertension - prior pregnancy [Z87.59]  Not Applicable    Choroid plexus cyst of fetus affecting management of mother in cabrera pregnancy, antepartum [O35.0XX0]  Yes    Echogenic focus of bowel of fetus affecting management of mother in cabrera  pregnancy, antepartum [O35.8XX0]  Yes      Resolved Hospital Problems    Diagnosis Date Resolved POA   No resolved problems to display.        Plan:     - Continue magnesium sulfate, no signs of toxicity at this time  - Close maternal monitoring including UOP and BP   Urine output averaged 96 mL/h over the past 4 hours   BP range over the past 4 hours of (127-137)/(65-85) 137/65  - Recheck in 2-4 hours or PRN      Ricco Bear

## 2017-06-08 NOTE — LACTATION NOTE
06/07/17 1900   Maternal Infant Assessment   Breast Density soft;Bilateral:   Nipple(s) Bilateral:;everted   Maternal Infant Feeding   Time Spent (min) 15-30 min   Breastfeeding History   Currently Breastfeeding yes   Equipment Type/Education   Pump Type Pump 'n Style   Breast Pump Type double electric, hospital grade   Breast Pump Flange Type hard   Breast Pump Flange Size 27 mm   Pumping Frequency (times) (8 or more times daily)   Lactation Referrals   Lactation Consult Initial assessment;Pump teaching   Lactation Interventions   Breastfeeding Assistance support offered;electric breast pump used   Maternal Breastfeeding Support lactation counseling provided   assisted pt with pumping for nicu baby. Education given. Questions answered.

## 2017-06-08 NOTE — MEDICAL/APP STUDENT
"MAG NOTE    Ms. Macie Guzman is a 26 y.o. female, PPD#1 who was admitted for Pre-E with SF on magnesium sulfate for seizure prophylaxis.     Patient denies headaches, denies blurry vision and denies right upper quadrant pain. denies CP, denies SOB. Patient reports no nausea/no vomiting.     Objective:       BP (!) 140/90   Pulse 76   Temp 97.6 °F (36.4 °C)   Resp 18   Ht 5' 1" (1.549 m)   Wt 101.6 kg (224 lb)   LMP 2016   SpO2 (!) 93%   Breastfeeding? No   BMI 42.32 kg/m²   Vitals:    17 1758 17 1801 17 1806 17 1807   BP:       BP Method:       Pulse: 98 76 75 76   Resp:       Temp:       TempSrc:       SpO2: (!) 94% 100% 97% (!) 93%   Weight:       Height:             I/O last 3 completed shifts:  In: 1615.8 [I.V.:1615.8]  Out: 5945 [Urine:5795; Blood:150]    No intake/output data recorded.      Date 17 0700 - 17 0659   Shift 5063-9996 3082-4873 1645-6532 24 Hour Total   I  N  T  A  K  E   I.V.  (mL/kg)  1275  (12.5)  1275  (12.5)    Shift Total  (mL/kg)  1275  (12.5)  1275  (12.5)   O  U  T  P  U  T   Urine  (mL/kg/hr) 2960  (3.6) 585  3545    Blood  150  150    Shift Total  (mL/kg) 2960  (29.1) 735  (7.2)  3695  (36.4)   Weight (kg) 101.6 101.6 101.6 101.6         General:   alert, appears stated age and cooperative   Lungs:   clear to auscultation bilaterally   Heart:   regular rate and rhythm, S1, S2 normal, no murmur, click, rub or gallop   Abdomen:  soft, non-tender; bowel sounds normal; no masses,  no organomegaly   Uterine Size:  firm located at the umblicus.    Extremities: peripheral pulses normal, no pedal edema, no clubbing or cyanosis   Reflexes - 2+  bilaterally         Lab Review  Recent Results (from the past 48 hour(s))   CBC with Auto Differential    Collection Time: 17  1:10 AM   Result Value Ref Range    WBC 12.25 3.90 - 12.70 K/uL    RBC 4.16 4.00 - 5.40 M/uL    Hemoglobin 11.9 (L) 12.0 - 16.0 g/dL    Hematocrit 34.8 (L) 37.0 - " 48.5 %    MCV 84 82 - 98 fL    MCH 28.6 27.0 - 31.0 pg    MCHC 34.2 32.0 - 36.0 %    RDW 13.0 11.5 - 14.5 %    Platelets 228 150 - 350 K/uL    MPV 10.9 9.2 - 12.9 fL    Gran # 9.3 (H) 1.8 - 7.7 K/uL    Lymph # 1.9 1.0 - 4.8 K/uL    Mono # 0.9 0.3 - 1.0 K/uL    Eos # 0.1 0.0 - 0.5 K/uL    Baso # 0.01 0.00 - 0.20 K/uL    Gran% 76.1 (H) 38.0 - 73.0 %    Lymph% 15.4 (L) 18.0 - 48.0 %    Mono% 7.3 4.0 - 15.0 %    Eosinophil% 0.4 0.0 - 8.0 %    Basophil% 0.1 0.0 - 1.9 %    Differential Method Automated    Type & Screen    Collection Time: 06/07/17  1:10 AM   Result Value Ref Range    Group & Rh O POS     Indirect Martha NEG    Comprehensive metabolic panel    Collection Time: 06/07/17  1:10 AM   Result Value Ref Range    Sodium 138 136 - 145 mmol/L    Potassium 3.4 (L) 3.5 - 5.1 mmol/L    Chloride 107 95 - 110 mmol/L    CO2 22 (L) 23 - 29 mmol/L    Glucose 79 70 - 110 mg/dL    BUN, Bld 7 6 - 20 mg/dL    Creatinine 0.7 0.5 - 1.4 mg/dL    Calcium 8.6 (L) 8.7 - 10.5 mg/dL    Total Protein 6.7 6.0 - 8.4 g/dL    Albumin 3.0 (L) 3.5 - 5.2 g/dL    Total Bilirubin 0.3 0.1 - 1.0 mg/dL    Alkaline Phosphatase 114 55 - 135 U/L    AST 10 10 - 40 U/L    ALT 8 (L) 10 - 44 U/L    Anion Gap 9 8 - 16 mmol/L    eGFR if African American >60 >60 mL/min/1.73 m^2    eGFR if non African American >60 >60 mL/min/1.73 m^2   Lactate dehydrogenase    Collection Time: 06/07/17  1:10 AM   Result Value Ref Range     110 - 260 U/L   HIV-1 and HIV-2 antibodies    Collection Time: 06/07/17  1:10 AM   Result Value Ref Range    HIV 1/2 Ag/Ab Negative Negative   RPR    Collection Time: 06/07/17  1:10 AM   Result Value Ref Range    RPR Non-reactive Non-reactive   Rapid HIV    Collection Time: 06/07/17  1:10 AM   Result Value Ref Range    HIV Rapid Testing Negative Negative   Protein / creatinine ratio, urine    Collection Time: 06/07/17  1:49 AM   Result Value Ref Range    Protein, Urine Random 21 (H) 0 - 15 mg/dL    Creatinine, Random Ur 130.4 15.0 -  325.0 mg/dL    Prot/Creat Ratio, Ur 0.16 0.00 - 0.20   ISTAT PROCEDURE    Collection Time: 17  3:25 PM   Result Value Ref Range    POC PH 7.286 (L) 7.35 - 7.45    POC PCO2 52.2 (H) 35 - 45 mmHg    POC PO2 18 (L) 80 - 100 mmHg    POC HCO3 24.9 24 - 28 mmol/L    POC BE -2 -2 to 2 mmol/L    POC SATURATED O2 22 (L) 95 - 100 %    Sample CRD V     Site Other     Allens Test N/A           Assessment:     26 y.o. female admitted for Pre-E with SF on magnesium sulfate for seizure prophylaxis     Active Hospital Problems    Diagnosis  POA    * (spontaneous vaginal delivery) [O80]  Not Applicable    Severe pre-eclampsia in third trimester [O14.13]  Yes    Late prenatal care [O09.30]  Not Applicable    Pregnancy with one fetus/bottle/btl papers signed 17 [Z34.90]  Not Applicable    History of gestational hypertension - prior pregnancy [Z87.59]  Not Applicable    Choroid plexus cyst of fetus affecting management of mother in cabrera pregnancy, antepartum [O35.0XX0]  Yes    Echogenic focus of bowel of fetus affecting management of mother in cabrera pregnancy, antepartum [O35.8XX0]  Yes      Resolved Hospital Problems    Diagnosis Date Resolved POA   No resolved problems to display.        Plan:   1. Continue magnesium sulfate, no signs of toxicity at this time  2. Monitor for s/s of eclampsia  3. Close maternal monitoring including UOP and BP   UOP: Avg. of 195 cc over last 3 hours   BP: Last was 140/90 at 1730  4. Recheck in 4 hours or PRN

## 2017-06-08 NOTE — PROGRESS NOTES
Magnesium Assessment Note      Subjective:         Macie Guzman is a 26 y.o. female at PPD#1 s/p  on IV MgSO4 for seizure prophylaxis.    Patient denies headaches, denies blurry vision and denies right upper quadrant pain. Denies CP, denies SOB. Patient reports no nausea/no vomiting.     Objective:      Temp:  [97.1 °F (36.2 °C)-97.7 °F (36.5 °C)] 97.1 °F (36.2 °C)  Pulse:  [] 62  Resp:  [18] 18  SpO2:  [89 %-100 %] 100 %  BP: (124-167)/() 145/87    I/O last 3 completed shifts:  In: 1615.8 [I.V.:1615.8]  Out: 6220 [Urine:6070; Blood:150]  I/O this shift:  In: 200 [I.V.:200]  Out: 1790 [Urine:1790]    General:   alert, appears stated age and cooperative   Lungs:   clear to auscultation bilaterally   Heart::   regular rate and rhythm, S1, S2 normal, no murmur, click, rub or gallop   Extremities: peripheral pulses normal, no pedal edema, no clubbing or cyanosis   DTRs- 1+  bilaterally     Lab Review  Recent Results (from the past 24 hour(s))   ISTAT PROCEDURE    Collection Time: 17  3:25 PM   Result Value Ref Range    POC PH 7.286 (L) 7.35 - 7.45    POC PCO2 52.2 (H) 35 - 45 mmHg    POC PO2 18 (L) 80 - 100 mmHg    POC HCO3 24.9 24 - 28 mmol/L    POC BE -2 -2 to 2 mmol/L    POC SATURATED O2 22 (L) 95 - 100 %    Sample CRD V     Site Other     Allens Test N/A         Assessment:     26 y.o.  female who is PPD#1 s/p , on IV magnesium sulfate.    Active Hospital Problems    Diagnosis  POA    * (spontaneous vaginal delivery) [O80]  Not Applicable    Severe pre-eclampsia in third trimester [O14.13]  Yes    Late prenatal care [O09.30]  Not Applicable    Pregnancy with one fetus/bottle/btl papers signed 17 [Z34.90]  Not Applicable    History of gestational hypertension - prior pregnancy [Z87.59]  Not Applicable    Choroid plexus cyst of fetus affecting management of mother in cabrera pregnancy, antepartum [O35.0XX0]  Yes    Echogenic focus of bowel of fetus affecting management  of mother in cabrera pregnancy, antepartum [O35.8XX0]  Yes      Resolved Hospital Problems    Diagnosis Date Resolved POA   No resolved problems to display.        Plan:     - Continue magnesium sulfate, no signs of toxicity at this time  - Close maternal monitoring including UOP and BP   Urine output averaged 96 mL/h over the past 4 hours   BP range over the past 4 hours of (127-137)/(65-85) 137/65  - Recheck in 2-4 hours or PRN      Evie Ochoa M.D.  PGY-3 OB/GYN  [phone]

## 2017-06-09 ENCOUNTER — TELEPHONE (OUTPATIENT)
Dept: OBSTETRICS AND GYNECOLOGY | Facility: CLINIC | Age: 27
End: 2017-06-09

## 2017-06-09 VITALS
RESPIRATION RATE: 18 BRPM | DIASTOLIC BLOOD PRESSURE: 80 MMHG | WEIGHT: 224 LBS | HEART RATE: 72 BPM | SYSTOLIC BLOOD PRESSURE: 136 MMHG | BODY MASS INDEX: 42.29 KG/M2 | OXYGEN SATURATION: 100 % | HEIGHT: 61 IN | TEMPERATURE: 98 F

## 2017-06-09 PROCEDURE — 25000003 PHARM REV CODE 250: Performed by: OBSTETRICS & GYNECOLOGY

## 2017-06-09 PROCEDURE — 90715 TDAP VACCINE 7 YRS/> IM: CPT | Performed by: OBSTETRICS & GYNECOLOGY

## 2017-06-09 PROCEDURE — 3E0234Z INTRODUCTION OF SERUM, TOXOID AND VACCINE INTO MUSCLE, PERCUTANEOUS APPROACH: ICD-10-PCS | Performed by: OBSTETRICS & GYNECOLOGY

## 2017-06-09 PROCEDURE — 90471 IMMUNIZATION ADMIN: CPT | Performed by: OBSTETRICS & GYNECOLOGY

## 2017-06-09 PROCEDURE — 63600175 PHARM REV CODE 636 W HCPCS: Performed by: OBSTETRICS & GYNECOLOGY

## 2017-06-09 PROCEDURE — 25000003 PHARM REV CODE 250: Performed by: STUDENT IN AN ORGANIZED HEALTH CARE EDUCATION/TRAINING PROGRAM

## 2017-06-09 RX ORDER — IBUPROFEN 600 MG/1
600 TABLET ORAL EVERY 6 HOURS PRN
Qty: 60 TABLET | Refills: 0 | Status: SHIPPED | OUTPATIENT
Start: 2017-06-09 | End: 2019-03-01

## 2017-06-09 RX ORDER — OXYCODONE AND ACETAMINOPHEN 5; 325 MG/1; MG/1
1 TABLET ORAL EVERY 6 HOURS PRN
Qty: 15 TABLET | Refills: 0 | Status: SHIPPED | OUTPATIENT
Start: 2017-06-09 | End: 2019-03-01

## 2017-06-09 RX ORDER — NIFEDIPINE 10 MG/1
10 CAPSULE ORAL ONCE
Status: COMPLETED | OUTPATIENT
Start: 2017-06-09 | End: 2017-06-09

## 2017-06-09 RX ORDER — NIFEDIPINE 30 MG/1
30 TABLET, EXTENDED RELEASE ORAL DAILY
Qty: 30 TABLET | Refills: 5 | Status: SHIPPED | OUTPATIENT
Start: 2017-06-09 | End: 2019-03-01

## 2017-06-09 RX ADMIN — NIFEDIPINE 10 MG: 10 CAPSULE, LIQUID FILLED ORAL at 03:06

## 2017-06-09 RX ADMIN — OXYCODONE HYDROCHLORIDE AND ACETAMINOPHEN 1 TABLET: 10; 325 TABLET ORAL at 06:06

## 2017-06-09 RX ADMIN — IBUPROFEN 600 MG: 600 TABLET, FILM COATED ORAL at 01:06

## 2017-06-09 RX ADMIN — CLOSTRIDIUM TETANI TOXOID ANTIGEN (FORMALDEHYDE INACTIVATED), CORYNEBACTERIUM DIPHTHERIAE TOXOID ANTIGEN (FORMALDEHYDE INACTIVATED), BORDETELLA PERTUSSIS TOXOID ANTIGEN (GLUTARALDEHYDE INACTIVATED), BORDETELLA PERTUSSIS FILAMENTOUS HEMAGGLUTININ ANTIGEN (FORMALDEHYDE INACTIVATED), BORDETELLA PERTUSSIS PERTACTIN ANTIGEN, AND BORDETELLA PERTUSSIS FIMBRIAE 2/3 ANTIGEN 0.5 ML: 5; 2; 2.5; 5; 3; 5 INJECTION, SUSPENSION INTRAMUSCULAR at 02:06

## 2017-06-09 NOTE — NURSING
Pt given discharge instructions, avs, and prescription medications. Pt verbalized understanding of instructions. Pt discharged and went to NICU to visit baby, she stated her ride home will pick her up from NICU.

## 2017-06-09 NOTE — TELEPHONE ENCOUNTER
----- Message from Tiffanie Hoover LPN sent at 6/6/2017  4:43 PM CDT -----  Reschedule appt. Missed on 06/06/2017.

## 2017-06-09 NOTE — ASSESSMENT & PLAN NOTE
Postpartum care:  - Patient doing well. Continue routine management and advances.  - Continue PO pain meds. Pain well controlled.  - Heme: H/h 12/35 >12/35. VSS, asymptomatic.  - Encourage ambulation  - Circumcision : defer to NICU  - Contraception: defer to postpartum visit   - Lactation : breastfeeding, pumping for infant in NICU, consult lactation prn   - Rh pos

## 2017-06-09 NOTE — ASSESSMENT & PLAN NOTE
- s/p magnesium  - BP normal to mild range without requiring antihypertensives                        BP: (119-146)/(61-83) 132/74  - Urinating well  - asymptomatic  - monitor BP and for recurrence of symptoms, needs BP follow up in 1 week

## 2017-06-09 NOTE — PROGRESS NOTES
Dr Angel and Dr Newman notified of pt's bps, will continue to monitor. Pt denies ha, blurry vision and epigastric pain.

## 2017-06-09 NOTE — SUBJECTIVE & OBJECTIVE
Interval History:  Macie Guzman is a 26 y.o. female status post Spontaneous vaginal delivery on 6/7/17 03:00 PM  at 36w0d. Complicated by PreE w/ SF (BP/HA), s/p magnesium sulfate for seizure ppx.     Patient is doing well today. She denies  nausea, vomiting, fever or chills, HA, vision changes, SOB, RUQ pain. Patient reports mild abdominal and perineal pain that is well relieved by oral pain medications. Vaginal bleeding is light. Patient ambulating and voiding without difficulty. She has passed flatus, and has had BM.     Patient is breastfeeding, pumping for infant in NICU.      Objective:     Vital Signs (Most Recent):  Temp: 96.6 °F (35.9 °C) (06/09/17 0602)  Pulse: 60 (06/09/17 0602)  Resp: 18 (06/09/17 0602)  BP: 132/74 (06/09/17 0602)  SpO2: 100 % (06/09/17 0602) Vital Signs (24h Range):  Temp:  [96.4 °F (35.8 °C)-96.9 °F (36.1 °C)] 96.6 °F (35.9 °C)  Pulse:  [57-67] 60  Resp:  [18] 18  SpO2:  [94 %-100 %] 100 %  BP: (119-146)/(61-83) 132/74     Weight: 101.6 kg (224 lb)  Body mass index is 42.32 kg/m².    No intake or output data in the 24 hours ending 06/09/17 0624    Significant Labs:  Lab Results   Component Value Date    GROUPTRH O POS 06/07/2017    HEPBSAG Negative 03/08/2017    STREPBCULT No Group B Streptococcus isolated 09/22/2014       Recent Labs  Lab 06/08/17  0542   HGB 12.0   HCT 35.0*       Physical Exam:   Constitutional: She is oriented to person, place, and time. She appears well-developed and well-nourished. No distress.     Eyes: EOM are normal.     Cardiovascular: Normal rate, regular rhythm and normal heart sounds.     Pulmonary/Chest: Effort normal and breath sounds normal. No respiratory distress. She has no wheezes. She has no rales.        Abdominal: Soft. She exhibits no distension. There is no tenderness.   Fundus firm, non-tender, and at the umbilicus             Musculoskeletal: Moves all extremeties. She exhibits edema (1+).       Neurological: She is alert and oriented to  person, place, and time. She has normal reflexes.    Skin: Skin is warm and dry.    Psychiatric: She has a normal mood and affect. Her behavior is normal. Thought content normal.

## 2017-06-09 NOTE — DISCHARGE INSTRUCTIONS
After a Vaginal Birth  After having a baby, your body may be very tired. It can take time to recover from a vaginal delivery. You may stay in the hospital or birth center from 1 to 4 days. In some cases, you may be able to go home the same day.    Right after the delivery  Your temperature and blood pressure will be taken until they are stable. A nurse or other healthcare provider will observe you as you rest. You may have afterbirth pains. These are cramps caused by the uterus shrinking. Sanitary pads are used to soak up the discharge of the uterine lining. To make sure that you arent bleeding too much, the pad will be checked. And the firmness of your uterus will be checked. To do this, a nurse will gently push down on your stomach. If you had anesthesia, youll be watched closely until you can feel and move your toes. If you have perineal pain (pain between the vagina and anus), an ice pack can help.   care  While still in the hospital or birth center, youll learn how to hold and feed your baby. You will also be given instructions on how to care for your baby. This includes bathing and feeding.   Preparing to go home  You may be anxious to go home as soon as possible. Before you and your baby go home, a healthcare provider will check to be sure you are healthy enough to take care of your baby and yourself. Youre ready to go home when:  · You can walk to the bathroom and use the bathroom without help.  · You can eat solid food and swallow pills (if needed).  · You have no sign of infection or other health problems, including fever.   · You have adequate pain control.  · Your bleeding isn't excessive.  · You are able to care for your  and are emotionally stable.  Before leaving the hospital or birth center, youll be given written instructions for home self-care after vaginal delivery. Be sure to follow these instructions carefully. If you have questions or concerns, talk about them now.  If you  have stitches  You may have received stitches in the skin near your vagina. The stitches might have closed an episiotomy (an incision that enlarges the opening of the vagina). Or you may have needed stitches to repair torn skin. Either way, your stitches should dissolve within weeks. Until then, you can help reduce discomfort, aid healing, and reduce your risk of infection by keeping the stitches clean. These tips can help:  · Gently wipe from front to back after you urinate or have a bowel movement.  · After wiping, spray warm water on the area. Or you can have a sitz bath. This means sitting in a tub with a few inches of water in it. Then pat the area dry or use a hairdryer on a cool setting.  · Do not use soap or any solution except water on the area.  · You can take a shower unless told not to.  · Change sanitary pads at least every 2 to 4 hours.  · Place cold or heat packs on the area as directed by your healthcare providers or nurses. Keep a thin towel between the pack and your skin.  · Sit on firm seats so the stitches pull less.   follow-up  Schedule a  follow-up exam with your healthcare provider for about 6 weeks after delivery. During this exam, your uterus and vaginal area will be checked. Contact your healthcare provider if you think you or your baby are having any problems.  When to call your healthcare provider  Call your health care provider right away if you have:  · A fever of 100.4°F (38.0°C) or higher  · Bleeding that requires a new sanitary pad after an hour, or large blood clots  · Pain in your vagina that gets worse and isn't relieved with medicine  · Swelling, discharge, or increased pain from vaginal tear or episiotomy  · Burning, pain, red streaks, or lumpy areas in your breasts that may be accompanied by flu-like symptoms  · Cracks, blisters, or blood on your nipples  · Burning or pain when you urinate  · Nausea or vomiting  · Dizziness or fainting  · Feelings of extreme  sadness or anxiety, or a feeling that you dont want to be with your baby  · Abdominal pain that isnt relieved with medicine  · Vaginal discharge that has a bad odor  · No bowel movement for 5 days  · Painful urination, orinability to control urination  · Redness, warmth, or pain in the lower leg  · Chest pain   Date Last Reviewed: 8/2/2015  © 5666-9660 [employer]. [address] [place] [address], [place] [address]. All rights reserved. This information is not intended as a substitute for professional medical care. Always follow your healthcare professional's instructions.         Mother baby care guide reviewed.     Preparation and Hygiene:  1. Shower daily.  2. Wear a clean bra each day and wash daily in warm soapy water.  3. Change wet or moist breast pads frequently.  Moist pads can promote growth of germs.  Actively wash your hands, paying close attention to the area around and under your fingernails, thoroughly with soap and water for 15 seconds before pumping or handling your milk.  Re-wash your hands if you touch anything (scratching your nose, answering the phone, etc) while pumping or handling your milk.   4. Before pumping your breasts, assemble the pump collection kit and have ready the sterile container and labels.  Place these items on a clean surface next to the breast pump.  5. Each time after you have finished pumping, take apart all of the parts of the breast pump collection kit and place them in a separate cleaning container (do not place them in the sink).  Be sure to remove the yellow valve from the breast shield and separate the white membrane from the yellow valve.  Rinse all of these parts with cool water.  Then use a new sponge and/or bottle brush and dishwashing detergent to clean the parts.  Rinse off the soapy water with cool water and air dry on a clean towel covered with a clean cloth.  All parts may also be washed after each use in the top rack of a .  6. Once each  day, sanitize all of the parts of the breast pump collection kit.  This can be done by boiling the kit parts for 10 minutes or by using a Quick Clean Micro-Steam Bag made by [employer]  7. If condensation appears in the tubing, continue to run the pump with the tubing attached for 1-2 minutes or until the tubing is dry.   8. Notify your babys nurse or doctor if you become ill or need to take any medication, even over-the-counter medicines.  Collection and Storage of Expressed Breast milk:         1. Pump your breasts at least 8 or more times every 24 hours.  Double pump (both breasts at the same time) for at least 15-20 minutes using the most suction that is comfortable.    2. Write the date and time of pumping and the name of any medications you are taking on the babys pre-printed hospital identification label.   3.    Do not touch the inside of the storage containers or lids.  4.        Tightly screw the lid onto the container and place immediately into the refrigerator for daily use.  5.    Expressed breast milk should be refrigerated or frozen within 4 hours of pumping.  6.        Do not store expressed breast milk on the door of your refrigerator or freezer             where the temperature is warmer.   7.        Refrigerated milk may be stored for up to 7 days.  At this point it can be moved to the freezer for 6 -12 months.  8.        Thaw frozen breast milk in overnight in the refrigerator.  Once milk is thawed it must be used within 24 hours.  9.        Refrigerated breast milk needs to be warmed to room temperature.  Warm by leaving unrefrigerated until it reached room temperature, or place sealed bottle into a cup of warm (not boiling) water or use a bottle warmer.               Never warm breast milk in a microwave or boiling water.    For any questions or concerns call the Lactation Department at ***

## 2017-06-09 NOTE — DISCHARGE SUMMARY
Delivery Discharge Summary  Obstetrics      Primary OB Clinician: [facility]    Admission date: 2017  Discharge date: 2017    Disposition: To home, self care    Admit Dx:      Patient Active Problem List   Diagnosis    Choroid plexus cyst of fetus affecting management of mother in cabrera pregnancy, antepartum    Echogenic focus of bowel of fetus affecting management of mother in cabrera pregnancy, antepartum    Late prenatal care    Pregnancy with one fetus/bottle/btl papers signed 17    History of gestational hypertension - prior pregnancy    Morbid obesity with BMI of 40.0-44.9, adult    Severe pre-eclampsia in third trimester     (spontaneous vaginal delivery)     Discharge Dx:    Patient Active Problem List   Diagnosis    Choroid plexus cyst of fetus affecting management of mother in cabrera pregnancy, antepartum    Echogenic focus of bowel of fetus affecting management of mother in cabrera pregnancy, antepartum    Late prenatal care    Pregnancy with one fetus/bottle/btl papers signed 17    History of gestational hypertension - prior pregnancy    Morbid obesity with BMI of 40.0-44.9, adult    Severe pre-eclampsia in third trimester     (spontaneous vaginal delivery)       Procedure:     Hospital Course:  Macie Guzman is a 26 y.o. now , PPD #2 who was admitted on 2017 at 36w0d for PreE w/SF (BP/HA). On initial assessment, BP was elevated in severe range; vital signs were otherwise stable and physical exam was normal. Infant was in cephalic presentation. Patient was subsequently admitted to labor and delivery unit with signed consents. She received late  steroids and magnesium sulfate for seizure ppx. Labor course was managed with cytotec, cook balloon, AROM, and pitocin.  Patient delivered a single viable  male. Please see delivery note for further details. Pt was in stable condition post delivery and was transferred to the  Mother-Baby Unit. She was continued on magnesium until PPD#1. BP controlled with procardia.     Her postpartum course was otherwise uncomplicated. On discharge day, patient's pain is controlled with oral pain medications. Pt is tolerating ambulation without SOB or CP, and PO diet without N/V. Reports lochia is mild. Denies any HA, vision changes, F/C, LE swelling. Denies any breast pain/soreness.  Pt in stable condition and ready for discharge. She has been instructed to continue procardia as indicated and pain medications as needed and to follow up in the OB clinic in 1 week for BP check and in 6 weeks for postpartum visit with her obstetrics provider.    Pertinent studies:  Postpartum CBC  Lab Results   Component Value Date    WBC 21.14 (H) 06/08/2017    HGB 12.0 06/08/2017    HCT 35.0 (L) 06/08/2017    MCV 83 06/08/2017     06/08/2017       Tubal Ligation: n/a  Feeding Method: breast, pumping for infant in NICU  Rh Immune Globulin Given(O POS): N/A  Rubella Vaccine Given: N/A, immune  Tdap Vaccine Given: 6/9/17    Delivery:    Episiotomy: None   Lacerations: None   Repair suture:     Repair # of packets:     Blood loss (ml): 150     Birth information:  YOB: 2016   Time of birth: 3:00 PM   Sex: male   Delivery type: Vaginal, Spontaneous Delivery   Gestational Age: 36w0d    Delivery Clinician:      Other providers:       Additional  information:  Forceps:    Vacuum:    Breech:    Observed anomalies      Living?:           APGARS  One minute Five minutes Ten minutes   Skin color:         Heart rate:         Grimace:         Muscle tone:         Breathing:         Totals: 2  9        Placenta: Delivered:       appearance      Patient Instructions:   Discharge Medication List as of 6/9/2017  5:50 PM      START taking these medications    Details   ibuprofen (ADVIL,MOTRIN) 600 MG tablet Take 1 tablet (600 mg total) by mouth every 6 (six) hours as needed., Starting Fri 6/9/2017, Normal       nifedipine (PROCARDIA-XL) 30 MG (OSM) 24 hr tablet Take 1 tablet (30 mg total) by mouth once daily., Starting Fri 6/9/2017, Until Sat 6/9/2018, Normal      oxycodone-acetaminophen (PERCOCET) 5-325 mg per tablet Take 1 tablet by mouth every 6 (six) hours as needed., Starting Fri 6/9/2017, Print               Discharge Procedure Orders  Diet general     Activity as tolerated     Other restrictions (specify):   Order Comments: Pelvic rest until follow up visit. Nothing in vagina -no sex, tampons, douching, etc.     Call MD for:  temperature >100.4     Call MD for:  persistent nausea and vomiting or diarrhea     Call MD for:  severe uncontrolled pain     Call MD for:  difficulty breathing or increased cough     Call MD for:  severe persistent headache     Call MD for:  persistent dizziness, light-headedness, or visual disturbances     Call MD for:  increased confusion or weakness     Call MD for:   Order Comments: Heavy vaginal bleeding saturating more than 1 pad per hr for at least consecutive 2 hrs.         Follow-up Information     Brea Celestin MD In 1 week.    Specialties:  Obstetrics, Obstetrics and Gynecology  Why:  Blood pressure check  Contact information:  [address] [id number] [phone]             Brea Celestin MD In 6 weeks.    Specialties:  Obstetrics, Obstetrics and Gynecology  Why:  Post Partum  Contact information:  [address] [address] [phone]                  Fabiola Naik MD  OBGYN - PGY 2

## 2017-06-09 NOTE — PROGRESS NOTES
[facility]  Maternal & Fetal Medicine  Progress Note    Patient Name: Macie Guzman  MRN: [id number]  Code Status: Prior   Admission Date: 6/6/2017  Length of Stay: 2 days  Attending Physician: Jovanni Cloud IV, MD  Primary Care Provider: Esther Thurman MD    Subjective:     Interval History:  Macie Guzman is a 26 y.o. female status post Spontaneous vaginal delivery on 6/7/17 03:00 PM  at 36w0d. Complicated by PreE w/ SF (BP/HA), s/p magnesium sulfate for seizure ppx.     Patient is doing well today. She denies  nausea, vomiting, fever or chills, HA, vision changes, SOB, RUQ pain. Patient reports mild abdominal and perineal pain that is well relieved by oral pain medications. Vaginal bleeding is light. Patient ambulating and voiding without difficulty. She has passed flatus, and has had BM.     Patient is breastfeeding, pumping for infant in NICU.      Objective:     Vital Signs (Most Recent):  Temp: 96.6 °F (35.9 °C) (06/09/17 0602)  Pulse: 60 (06/09/17 0602)  Resp: 18 (06/09/17 0602)  BP: 132/74 (06/09/17 0602)  SpO2: 100 % (06/09/17 0602) Vital Signs (24h Range):  Temp:  [96.4 °F (35.8 °C)-96.9 °F (36.1 °C)] 96.6 °F (35.9 °C)  Pulse:  [57-67] 60  Resp:  [18] 18  SpO2:  [94 %-100 %] 100 %  BP: (119-146)/(61-83) 132/74     Weight: 101.6 kg (224 lb)  Body mass index is 42.32 kg/m².    No intake or output data in the 24 hours ending 06/09/17 0624    Significant Labs:  Lab Results   Component Value Date    GROUPTRH O POS 06/07/2017    HEPBSAG Negative 03/08/2017    STREPBCULT No Group B Streptococcus isolated 09/22/2014       Recent Labs  Lab 06/08/17  0542   HGB 12.0   HCT 35.0*       Physical Exam:   Constitutional: She is oriented to person, place, and time. She appears well-developed and well-nourished. No distress.     Eyes: EOM are normal.     Cardiovascular: Normal rate, regular rhythm and normal heart sounds.     Pulmonary/Chest: Effort normal and breath sounds normal. No respiratory  distress. She has no wheezes. She has no rales.        Abdominal: Soft. She exhibits no distension. There is no tenderness.   Fundus firm, non-tender, and at the umbilicus             Musculoskeletal: Moves all extremeties. She exhibits edema (1+).       Neurological: She is alert and oriented to person, place, and time. She has normal reflexes.    Skin: Skin is warm and dry.    Psychiatric: She has a normal mood and affect. Her behavior is normal. Thought content normal.       Assessment/Plan:   25 yo     *  (spontaneous vaginal delivery)    Postpartum care:  - Patient doing well. Continue routine management and advances.  - Continue PO pain meds. Pain well controlled.  - Heme: H/h  >. VSS, asymptomatic.  - Encourage ambulation  - Circumcision : defer to NICU  - Contraception: defer to postpartum visit   - Lactation : breastfeeding, pumping for infant in NICU, consult lactation prn   - Rh pos            Severe pre-eclampsia in third trimester    - s/p magnesium  - BP normal to mild range without requiring antihypertensives                         BP: (119-146)/(61-83) 132/74  - Urinating well  - asymptomatic  - monitor BP and for recurrence of symptoms, needs BP follow up in 1 week          Dispo: discharge home today, PPD#2    Fabiola Naik MD  Maternal & Fetal Medicine  [facility]    I have reviewed the notes, assessments, and/or procedures performed by DR NAIK, I concur with her/his documentation of Macie Guzman.

## 2017-06-09 NOTE — PLAN OF CARE
Problem: Patient Care Overview  Goal: Plan of Care Review  Outcome: Ongoing (interventions implemented as appropriate)  Pt doing well.  VS stable. No acute changes this shift.   Pt denies headaches, blurry vision, and epigastric pain.

## 2017-06-11 ENCOUNTER — PATIENT MESSAGE (OUTPATIENT)
Dept: OBSTETRICS AND GYNECOLOGY | Facility: CLINIC | Age: 27
End: 2017-06-11

## 2017-07-06 ENCOUNTER — PATIENT MESSAGE (OUTPATIENT)
Dept: OBSTETRICS AND GYNECOLOGY | Facility: CLINIC | Age: 27
End: 2017-07-06

## 2017-07-06 DIAGNOSIS — N30.00 ACUTE CYSTITIS WITHOUT HEMATURIA: Primary | ICD-10-CM

## 2017-07-06 RX ORDER — NITROFURANTOIN 25; 75 MG/1; MG/1
100 CAPSULE ORAL 2 TIMES DAILY
Qty: 14 CAPSULE | Refills: 0 | Status: SHIPPED | OUTPATIENT
Start: 2017-07-06 | End: 2017-07-13

## 2017-07-12 ENCOUNTER — TELEPHONE (OUTPATIENT)
Dept: OBSTETRICS AND GYNECOLOGY | Facility: CLINIC | Age: 27
End: 2017-07-12

## 2017-07-12 NOTE — TELEPHONE ENCOUNTER
Pt returned call and c/o vaginal itching, irritation and a thick white vaginal discharge without an odor. Scheduled appt per pt request. Pt voiced understanding.

## 2017-07-22 ENCOUNTER — PATIENT MESSAGE (OUTPATIENT)
Dept: OBSTETRICS AND GYNECOLOGY | Facility: CLINIC | Age: 27
End: 2017-07-22

## 2017-07-24 ENCOUNTER — PATIENT MESSAGE (OUTPATIENT)
Dept: OBSTETRICS AND GYNECOLOGY | Facility: CLINIC | Age: 27
End: 2017-07-24

## 2017-07-24 ENCOUNTER — TELEPHONE (OUTPATIENT)
Dept: OBSTETRICS AND GYNECOLOGY | Facility: CLINIC | Age: 27
End: 2017-07-24

## 2017-07-24 RX ORDER — FLUCONAZOLE 150 MG/1
150 TABLET ORAL DAILY
Qty: 1 TABLET | Refills: 1 | Status: SHIPPED | OUTPATIENT
Start: 2017-07-24 | End: 2017-07-25

## 2017-07-25 ENCOUNTER — TELEPHONE (OUTPATIENT)
Dept: OBSTETRICS AND GYNECOLOGY | Facility: CLINIC | Age: 27
End: 2017-07-25

## 2017-08-23 ENCOUNTER — PATIENT MESSAGE (OUTPATIENT)
Dept: OBSTETRICS AND GYNECOLOGY | Facility: CLINIC | Age: 27
End: 2017-08-23

## 2017-09-11 ENCOUNTER — PATIENT MESSAGE (OUTPATIENT)
Dept: OBSTETRICS AND GYNECOLOGY | Facility: CLINIC | Age: 27
End: 2017-09-11

## 2017-09-13 ENCOUNTER — PATIENT MESSAGE (OUTPATIENT)
Dept: OBSTETRICS AND GYNECOLOGY | Facility: CLINIC | Age: 27
End: 2017-09-13

## 2017-09-13 ENCOUNTER — TELEPHONE (OUTPATIENT)
Dept: OBSTETRICS AND GYNECOLOGY | Facility: CLINIC | Age: 27
End: 2017-09-13

## 2017-09-13 RX ORDER — NITROFURANTOIN 25; 75 MG/1; MG/1
100 CAPSULE ORAL 2 TIMES DAILY
Qty: 14 CAPSULE | Refills: 0 | Status: SHIPPED | OUTPATIENT
Start: 2017-09-13 | End: 2017-09-20

## 2017-09-13 NOTE — TELEPHONE ENCOUNTER
----- Message from Tiffanie Hoover LPN sent at 9/11/2017  4:23 PM CDT -----    I'm going to get Tiffanie to schedule you an appointment.

## 2017-09-19 RX ORDER — METRONIDAZOLE 500 MG/1
500 TABLET ORAL 2 TIMES DAILY
Qty: 14 TABLET | Refills: 0 | Status: SHIPPED | OUTPATIENT
Start: 2017-09-19 | End: 2017-09-24

## 2017-10-30 ENCOUNTER — PATIENT MESSAGE (OUTPATIENT)
Dept: OBSTETRICS AND GYNECOLOGY | Facility: CLINIC | Age: 27
End: 2017-10-30

## 2018-01-10 ENCOUNTER — PATIENT MESSAGE (OUTPATIENT)
Dept: OBSTETRICS AND GYNECOLOGY | Facility: CLINIC | Age: 28
End: 2018-01-10

## 2018-04-16 ENCOUNTER — PATIENT MESSAGE (OUTPATIENT)
Dept: OBSTETRICS AND GYNECOLOGY | Facility: CLINIC | Age: 28
End: 2018-04-16

## 2018-04-16 NOTE — TELEPHONE ENCOUNTER
Called pt and scheduled appt at [facility] per Dr. Celestin. Pt voiced understanding.

## 2018-09-10 ENCOUNTER — HOSPITAL ENCOUNTER (EMERGENCY)
Facility: OTHER | Age: 28
Discharge: HOME | End: 2018-09-10
Attending: EMERGENCY MEDICINE
Payer: MEDICAID

## 2018-09-10 VITALS
TEMPERATURE: 98 F | OXYGEN SATURATION: 98 % | HEIGHT: 60 IN | SYSTOLIC BLOOD PRESSURE: 140 MMHG | RESPIRATION RATE: 18 BRPM | BODY MASS INDEX: 37.57 KG/M2 | WEIGHT: 191.38 LBS | HEART RATE: 54 BPM | DIASTOLIC BLOOD PRESSURE: 84 MMHG

## 2018-09-10 DIAGNOSIS — R42 DIZZINESS: Primary | ICD-10-CM

## 2018-09-10 LAB
ANION GAP SERPL CALC-SCNC: 15 MMOL/L
B-HCG UR QL: NEGATIVE
BACTERIA #/AREA URNS HPF: ABNORMAL /HPF
BASOPHILS # BLD AUTO: 0.02 K/UL
BASOPHILS NFR BLD: 0.4 %
BILIRUB UR QL STRIP: NEGATIVE
BUN SERPL-MCNC: 7 MG/DL
CALCIUM SERPL-MCNC: 9.3 MG/DL
CHLORIDE SERPL-SCNC: 107 MMOL/L
CLARITY UR: CLEAR
CO2 SERPL-SCNC: 20 MMOL/L
COLOR UR: YELLOW
CREAT SERPL-MCNC: 0.7 MG/DL
CTP QC/QA: YES
D DIMER PPP IA.FEU-MCNC: 0.19 MG/L FEU
DIFFERENTIAL METHOD: ABNORMAL
EOSINOPHIL # BLD AUTO: 0.1 K/UL
EOSINOPHIL NFR BLD: 1.1 %
ERYTHROCYTE [DISTWIDTH] IN BLOOD BY AUTOMATED COUNT: 12.8 %
EST. GFR  (AFRICAN AMERICAN): >60 ML/MIN/1.73 M^2
EST. GFR  (NON AFRICAN AMERICAN): >60 ML/MIN/1.73 M^2
GIANT PLATELETS BLD QL SMEAR: PRESENT
GLUCOSE SERPL-MCNC: 89 MG/DL
GLUCOSE UR QL STRIP: NEGATIVE
HCT VFR BLD AUTO: 39.1 %
HGB BLD-MCNC: 12.2 G/DL
HGB UR QL STRIP: ABNORMAL
KETONES UR QL STRIP: ABNORMAL
LEUKOCYTE ESTERASE UR QL STRIP: NEGATIVE
LYMPHOCYTES # BLD AUTO: 2.1 K/UL
LYMPHOCYTES NFR BLD: 43.3 %
MCH RBC QN AUTO: 27.9 PG
MCHC RBC AUTO-ENTMCNC: 31.2 G/DL
MCV RBC AUTO: 90 FL
MICROSCOPIC COMMENT: ABNORMAL
MONOCYTES # BLD AUTO: 0.3 K/UL
MONOCYTES NFR BLD: 5.7 %
NEUTROPHILS # BLD AUTO: 2.4 K/UL
NEUTROPHILS NFR BLD: 49.5 %
NITRITE UR QL STRIP: NEGATIVE
PH UR STRIP: 7 [PH] (ref 5–8)
PLATELET # BLD AUTO: 63 K/UL
PLATELET BLD QL SMEAR: ABNORMAL
PMV BLD AUTO: 11.3 FL
POLYCHROMASIA BLD QL SMEAR: ABNORMAL
POTASSIUM SERPL-SCNC: 4.4 MMOL/L
PROT UR QL STRIP: NEGATIVE
RBC # BLD AUTO: 4.37 M/UL
RBC #/AREA URNS HPF: 6 /HPF (ref 0–4)
SODIUM SERPL-SCNC: 142 MMOL/L
SP GR UR STRIP: 1.02 (ref 1–1.03)
URN SPEC COLLECT METH UR: ABNORMAL
UROBILINOGEN UR STRIP-ACNC: ABNORMAL EU/DL
WBC # BLD AUTO: 4.76 K/UL

## 2018-09-10 PROCEDURE — 99283 EMERGENCY DEPT VISIT LOW MDM: CPT | Mod: 25

## 2018-09-10 PROCEDURE — 85379 FIBRIN DEGRADATION QUANT: CPT

## 2018-09-10 PROCEDURE — 93005 ELECTROCARDIOGRAM TRACING: CPT

## 2018-09-10 PROCEDURE — 81000 URINALYSIS NONAUTO W/SCOPE: CPT

## 2018-09-10 PROCEDURE — 25000003 PHARM REV CODE 250: Performed by: EMERGENCY MEDICINE

## 2018-09-10 PROCEDURE — 81025 URINE PREGNANCY TEST: CPT | Performed by: EMERGENCY MEDICINE

## 2018-09-10 PROCEDURE — 80048 BASIC METABOLIC PNL TOTAL CA: CPT

## 2018-09-10 PROCEDURE — 96360 HYDRATION IV INFUSION INIT: CPT

## 2018-09-10 PROCEDURE — 93010 ELECTROCARDIOGRAM REPORT: CPT | Mod: ,,, | Performed by: INTERNAL MEDICINE

## 2018-09-10 PROCEDURE — 85025 COMPLETE CBC W/AUTO DIFF WBC: CPT

## 2018-09-10 RX ORDER — SODIUM CHLORIDE 9 MG/ML
1000 INJECTION, SOLUTION INTRAVENOUS
Status: COMPLETED | OUTPATIENT
Start: 2018-09-10 | End: 2018-09-10

## 2018-09-10 RX ADMIN — SODIUM CHLORIDE 1000 ML: 0.9 INJECTION, SOLUTION INTRAVENOUS at 02:09

## 2018-09-10 NOTE — ED PROVIDER NOTES
Encounter Date: 9/10/2018       History     Chief Complaint   Patient presents with    Dizziness     Pt came to the ED tonight c/o dizziness, nause when she eats and high blood pressure on and off for 2 weeks now     Time seen by provider: 1:34 AM    This is a 28 y.o. Female, with history of HTN, who presents with complaint of lightheadedness and nausea which started 3 days ago after work. Her symptoms dissipated and reappeared today after bathing, prompting her to be seen today. She also reports some tension in neck and SOB. She denies fever, chills, sore throat, CP, abdominal pain, V/D, any urinary symptoms, back pain, headache, and weakness. She reports that she is noncompliant with pressure medications.             Review of patient's allergies indicates:  No Known Allergies  History reviewed. No pertinent past medical history.  History reviewed. No pertinent surgical history.  Family History   Problem Relation Age of Onset    Colon cancer Neg Hx     Eclampsia Neg Hx     Breast cancer Neg Hx     Ovarian cancer Neg Hx      Social History     Tobacco Use    Smoking status: Never Smoker    Smokeless tobacco: Never Used   Substance Use Topics    Alcohol use: No    Drug use: No     Review of Systems   Constitutional: Negative for chills and fever.   HENT: Negative for sore throat.    Respiratory: Positive for shortness of breath.    Cardiovascular: Negative for chest pain.   Gastrointestinal: Negative for abdominal pain, diarrhea, nausea and vomiting.   Genitourinary: Negative for decreased urine volume, difficulty urinating, dyspareunia, dysuria, enuresis, frequency, hematuria and urgency.   Musculoskeletal: Positive for neck pain. Negative for back pain.   Skin: Negative for color change, pallor and rash.   Neurological: Positive for light-headedness. Negative for headaches.   Hematological: Negative for adenopathy. Does not bruise/bleed easily.       Physical Exam     Initial Vitals [09/10/18 0118]   BP  Pulse Resp Temp SpO2   (!) 166/105 67 19 98.6 °F (37 °C) 98 %      MAP       --         Physical Exam    Nursing note and vitals reviewed.  Constitutional: She appears well-developed and well-nourished. She is not diaphoretic. No distress.   HENT:   Head: Normocephalic and atraumatic.   Eyes: Conjunctivae and EOM are normal. No scleral icterus.   Neck: Normal range of motion. Neck supple.   Cardiovascular: Normal rate, regular rhythm and normal heart sounds. Exam reveals no gallop and no friction rub.    No murmur heard.  Pulmonary/Chest: Breath sounds normal. No respiratory distress. She has no wheezes. She has no rhonchi. She has no rales.   Abdominal: Soft. Bowel sounds are normal. She exhibits no distension. There is no tenderness. There is no rebound and no guarding.   Musculoskeletal: Normal range of motion. She exhibits no edema or tenderness.   Neurological:   Cranial nerves II through XII grossly intact.  5/5 motor strength all 4 extremities.  Sensation is normal.  Finger to nose normal.  Gait normal.  Speech and cognition is normal.  No focal neurologic deficit. No midline spinal tenderness, stepoffs, or deformities. No palpable muscle spasm.    Skin: Skin is warm and dry. No rash noted. No erythema. No pallor.   Psychiatric: She has a normal mood and affect. Her behavior is normal. Judgment and thought content normal.         ED Course   Procedures  Labs Reviewed   POCT URINE PREGNANCY     EKG Readings: (Independently Interpreted)   Sinus bradycardic at 53bpm. No STEMI. Unchanged since 1-7-18 including bradycardia.       Imaging Results    None          Medical Decision Making:   Clinical Tests:   Lab Tests: Ordered and Reviewed  Medical Tests: Ordered and Reviewed  ED Management:  Well-appearing patient with a constellation of symptoms that do not unite into a typical medical diagnosis.  There is no signs of meningitis, no signs of subarachnoid hemorrhage, no signs of acute ischemia.  Blood work  including D-dimer without acute abnormalities.  EKG demonstrates unchanged bradycardia from priors no concerning findings.  Patient feels better after saline hydration.  Unclear if she is coming down with a viral syndrome.  Discharged to follow up close with primary care return here if worse.    I did have an extensive talk regarding signs to return for and need for follow up. Patient expressed understanding and will monitor symptoms closely and follow-up as needed.    MONTY Franco M.D.  09/10/2018  3:13 AM                        Clinical Impression:     1. Dizziness                                   Prateek Franco MD  09/10/18 0313

## 2018-09-10 NOTE — ED TRIAGE NOTES
Pt came to the ED tonight c.o. Multiple medical complaints for about 2 weeks. Pt has been having dizziness with some associated high blood pressure and nausea only when she eats. Pt is able to walk with steady gait with no assistance. Pt states that these symptoms are not associated together and they come and go at different times of the day. Pt is in no acute distress at this time and denies CP, SOB, or photophobia. Will be monitored

## 2018-09-10 NOTE — ED NOTES
Pt requesting to go to BR. Sat up on side of bed w/o difficulty, denies dizziness. Ambulated to and from BR with steady gait. No complaints at this time

## 2018-10-15 ENCOUNTER — TELEPHONE (OUTPATIENT)
Dept: OBSTETRICS AND GYNECOLOGY | Facility: CLINIC | Age: 28
End: 2018-10-15

## 2018-10-15 NOTE — TELEPHONE ENCOUNTER
----- Message from Dianne Solares sent at 10/15/2018 11:19 AM CDT -----  Contact: pt   Can the clinic reply in [facility]: no    Who Called: PAOLA HAWLEY [[id number]]    Date of Positive Preg Test: 09-    1st day of Last Menstrual Cycle: 08-    List Any Difficulties: no    What Number to Call Back: [phone]

## 2018-10-23 PROBLEM — O35.DXX0 ECHOGENIC FOCUS OF BOWEL OF FETUS AFFECTING MANAGEMENT OF MOTHER IN SINGLETON PREGNANCY, ANTEPARTUM: Status: RESOLVED | Noted: 2017-03-28 | Resolved: 2018-10-23

## 2018-10-23 PROBLEM — O09.30 LATE PRENATAL CARE: Status: RESOLVED | Noted: 2017-04-13 | Resolved: 2018-10-23

## 2018-10-23 PROBLEM — Z87.59 HISTORY OF GESTATIONAL HYPERTENSION: Status: RESOLVED | Noted: 2017-04-13 | Resolved: 2018-10-23

## 2018-10-23 PROBLEM — O14.13 SEVERE PRE-ECLAMPSIA IN THIRD TRIMESTER: Status: RESOLVED | Noted: 2017-06-07 | Resolved: 2018-10-23

## 2018-10-23 PROBLEM — O35.03X0 CHOROID PLEXUS CYST OF FETUS AFFECTING MANAGEMENT OF MOTHER IN SINGLETON PREGNANCY, ANTEPARTUM: Status: RESOLVED | Noted: 2017-03-28 | Resolved: 2018-10-23

## 2018-10-23 PROBLEM — Z34.90 PREGNANCY WITH ONE FETUS: Status: RESOLVED | Noted: 2017-04-13 | Resolved: 2018-10-23

## 2018-10-23 LAB
HM PAP SMEAR: NORMAL
HUMAN PAPILLOMAVIRUS (HPV): POSITIVE

## 2018-12-26 DIAGNOSIS — Z36.89 ENCOUNTER FOR FETAL ANATOMIC SURVEY: Primary | ICD-10-CM

## 2018-12-26 DIAGNOSIS — O10.919 CHRONIC HYPERTENSION AFFECTING PREGNANCY: ICD-10-CM

## 2019-03-01 ENCOUNTER — PROCEDURE VISIT (OUTPATIENT)
Dept: MATERNAL FETAL MEDICINE | Facility: CLINIC | Age: 29
End: 2019-03-01
Payer: MEDICAID

## 2019-03-01 ENCOUNTER — INITIAL CONSULT (OUTPATIENT)
Dept: MATERNAL FETAL MEDICINE | Facility: CLINIC | Age: 29
End: 2019-03-01
Attending: OBSTETRICS & GYNECOLOGY
Payer: MEDICAID

## 2019-03-01 VITALS
DIASTOLIC BLOOD PRESSURE: 78 MMHG | BODY MASS INDEX: 41.72 KG/M2 | WEIGHT: 213.63 LBS | SYSTOLIC BLOOD PRESSURE: 118 MMHG

## 2019-03-01 DIAGNOSIS — O99.891 CURRENT MATERNAL CONDITION AFFECTING PREGNANCY: ICD-10-CM

## 2019-03-01 DIAGNOSIS — O16.2 HYPERTENSION DURING PREGNANCY IN SECOND TRIMESTER, UNSPECIFIED HYPERTENSION IN PREGNANCY TYPE: ICD-10-CM

## 2019-03-01 DIAGNOSIS — Z36.89 ENCOUNTER FOR FETAL ANATOMIC SURVEY: Primary | ICD-10-CM

## 2019-03-01 DIAGNOSIS — Z36.3 ANTENATAL SCREENING FOR MALFORMATION USING ULTRASONICS: ICD-10-CM

## 2019-03-01 DIAGNOSIS — Z36.89 ENCOUNTER FOR ULTRASOUND TO CHECK FETAL GROWTH: ICD-10-CM

## 2019-03-01 DIAGNOSIS — E66.01 MORBID OBESITY WITH BMI OF 40.0-44.9, ADULT: ICD-10-CM

## 2019-03-01 PROCEDURE — 99213 PR OFFICE/OUTPT VISIT, EST, LEVL III, 20-29 MIN: ICD-10-PCS | Mod: S$PBB,25,TH, | Performed by: OBSTETRICS & GYNECOLOGY

## 2019-03-01 PROCEDURE — 99999 PR PBB SHADOW E&M-EST. PATIENT-LVL III: ICD-10-PCS | Mod: PBBFAC,,, | Performed by: OBSTETRICS & GYNECOLOGY

## 2019-03-01 PROCEDURE — 76811 OB US DETAILED SNGL FETUS: CPT | Mod: PBBFAC | Performed by: OBSTETRICS & GYNECOLOGY

## 2019-03-01 PROCEDURE — 76811 PR US, OB FETAL EVAL & EXAM, TRANSABDOM,FIRST GESTATION: ICD-10-PCS | Mod: 26,S$PBB,, | Performed by: OBSTETRICS & GYNECOLOGY

## 2019-03-01 PROCEDURE — 99999 PR PBB SHADOW E&M-EST. PATIENT-LVL III: CPT | Mod: PBBFAC,,, | Performed by: OBSTETRICS & GYNECOLOGY

## 2019-03-01 PROCEDURE — 99213 OFFICE O/P EST LOW 20 MIN: CPT | Mod: S$PBB,25,TH, | Performed by: OBSTETRICS & GYNECOLOGY

## 2019-03-01 PROCEDURE — 76811 OB US DETAILED SNGL FETUS: CPT | Mod: 26,S$PBB,, | Performed by: OBSTETRICS & GYNECOLOGY

## 2019-03-01 RX ORDER — METRONIDAZOLE 250 MG/1
TABLET ORAL
Refills: 0 | COMMUNITY
Start: 2018-12-14 | End: 2019-04-02

## 2019-03-01 RX ORDER — NITROFURANTOIN 25; 75 MG/1; MG/1
CAPSULE ORAL
Refills: 0 | COMMUNITY
Start: 2018-12-14 | End: 2019-04-02

## 2019-03-01 RX ORDER — NAPROXEN SODIUM 220 MG/1
81 TABLET, FILM COATED ORAL DAILY
COMMUNITY
End: 2020-04-27 | Stop reason: CLARIF

## 2019-03-01 RX ORDER — LABETALOL 100 MG/1
TABLET, FILM COATED ORAL
Refills: 1 | COMMUNITY
Start: 2018-12-20 | End: 2019-04-02

## 2019-03-01 NOTE — LETTER
March 3, 2019      Terrell Jamison NP  [address]  [address]  [facility] [address]           [facility] [address]  [address]  [facility] [address]  Phone: [phone]          Patient: Macie Guzman   MR Number: [id number]   YOB: 1990   Date of Visit: 3/1/2019       Dear Terrell Jamison:    Thank you for referring Macie Guzman to me for evaluation. Attached you will find relevant portions of my assessment and plan of care.    If you have questions, please do not hesitate to call me. I look forward to following Macie Guzman along with you.    Sincerely,    Dianne Cardona MD    Enclosure  CC:  No Recipients    If you would like to receive this communication electronically, please contact [email or web address] or [phone] to request more information on [facility] access.    For providers and/or their staff who would like to refer a patient to [facility], please contact us through our one-stop-shop provider referral line, [facility] , at [phone].    If you feel you have received this communication in error or would no longer like to receive these types of communications, please e-mail [email or web address]

## 2019-03-05 NOTE — PROGRESS NOTES
See ultrasound report for details of ultrasound evaluation, consultation,  and recommendations.

## 2019-04-02 ENCOUNTER — TELEPHONE (OUTPATIENT)
Dept: OBSTETRICS AND GYNECOLOGY | Facility: CLINIC | Age: 29
End: 2019-04-02

## 2019-04-02 ENCOUNTER — PROCEDURE VISIT (OUTPATIENT)
Dept: MATERNAL FETAL MEDICINE | Facility: CLINIC | Age: 29
End: 2019-04-02
Payer: MEDICAID

## 2019-04-02 ENCOUNTER — ROUTINE PRENATAL (OUTPATIENT)
Dept: OBSTETRICS AND GYNECOLOGY | Facility: CLINIC | Age: 29
End: 2019-04-02
Payer: MEDICAID

## 2019-04-02 ENCOUNTER — APPOINTMENT (OUTPATIENT)
Dept: LAB | Facility: HOSPITAL | Age: 29
End: 2019-04-02
Attending: OBSTETRICS & GYNECOLOGY
Payer: MEDICAID

## 2019-04-02 VITALS
SYSTOLIC BLOOD PRESSURE: 130 MMHG | BODY MASS INDEX: 43.44 KG/M2 | WEIGHT: 222.44 LBS | DIASTOLIC BLOOD PRESSURE: 82 MMHG

## 2019-04-02 DIAGNOSIS — Z3A.30 30 WEEKS GESTATION OF PREGNANCY: Primary | ICD-10-CM

## 2019-04-02 DIAGNOSIS — O10.919 CHRONIC HYPERTENSION AFFECTING PREGNANCY: ICD-10-CM

## 2019-04-02 DIAGNOSIS — Z36.89 ENCOUNTER FOR ULTRASOUND TO CHECK FETAL GROWTH: ICD-10-CM

## 2019-04-02 LAB
ABO + RH BLD: NORMAL
BASOPHILS # BLD AUTO: 0.03 K/UL (ref 0–0.2)
BASOPHILS NFR BLD: 0.3 % (ref 0–1.9)
BLD GP AB SCN CELLS X3 SERPL QL: NORMAL
DIFFERENTIAL METHOD: ABNORMAL
EOSINOPHIL # BLD AUTO: 0.1 K/UL (ref 0–0.5)
EOSINOPHIL NFR BLD: 0.7 % (ref 0–8)
ERYTHROCYTE [DISTWIDTH] IN BLOOD BY AUTOMATED COUNT: 12.8 % (ref 11.5–14.5)
HCT VFR BLD AUTO: 35.6 % (ref 37–48.5)
HGB BLD-MCNC: 11.7 G/DL (ref 12–16)
IMM GRANULOCYTES # BLD AUTO: 0.08 K/UL (ref 0–0.04)
IMM GRANULOCYTES NFR BLD AUTO: 0.7 % (ref 0–0.5)
LYMPHOCYTES # BLD AUTO: 1.8 K/UL (ref 1–4.8)
LYMPHOCYTES NFR BLD: 16.4 % (ref 18–48)
MCH RBC QN AUTO: 28.7 PG (ref 27–31)
MCHC RBC AUTO-ENTMCNC: 32.9 G/DL (ref 32–36)
MCV RBC AUTO: 87 FL (ref 82–98)
MONOCYTES # BLD AUTO: 0.6 K/UL (ref 0.3–1)
MONOCYTES NFR BLD: 5.4 % (ref 4–15)
NEUTROPHILS # BLD AUTO: 8.2 K/UL (ref 1.8–7.7)
NEUTROPHILS NFR BLD: 76.5 % (ref 38–73)
NRBC BLD-RTO: 0 /100 WBC
PLATELET # BLD AUTO: 231 K/UL (ref 150–350)
PMV BLD AUTO: 10.9 FL (ref 9.2–12.9)
RBC # BLD AUTO: 4.08 M/UL (ref 4–5.4)
WBC # BLD AUTO: 10.75 K/UL (ref 3.9–12.7)

## 2019-04-02 PROCEDURE — 99999 PR PBB SHADOW E&M-EST. PATIENT-LVL III: CPT | Mod: PBBFAC,,, | Performed by: ADVANCED PRACTICE MIDWIFE

## 2019-04-02 PROCEDURE — 99499 UNLISTED E&M SERVICE: CPT | Mod: S$PBB,,, | Performed by: OBSTETRICS & GYNECOLOGY

## 2019-04-02 PROCEDURE — 99213 OFFICE O/P EST LOW 20 MIN: CPT | Mod: PBBFAC,25,TH,PO | Performed by: ADVANCED PRACTICE MIDWIFE

## 2019-04-02 PROCEDURE — 87491 CHLMYD TRACH DNA AMP PROBE: CPT

## 2019-04-02 PROCEDURE — 99499 NO LOS: ICD-10-PCS | Mod: S$PBB,,, | Performed by: OBSTETRICS & GYNECOLOGY

## 2019-04-02 PROCEDURE — 86762 RUBELLA ANTIBODY: CPT

## 2019-04-02 PROCEDURE — 86850 RBC ANTIBODY SCREEN: CPT

## 2019-04-02 PROCEDURE — 85025 COMPLETE CBC W/AUTO DIFF WBC: CPT

## 2019-04-02 PROCEDURE — 99999 PR PBB SHADOW E&M-EST. PATIENT-LVL III: ICD-10-PCS | Mod: PBBFAC,,, | Performed by: ADVANCED PRACTICE MIDWIFE

## 2019-04-02 PROCEDURE — 99213 PR OFFICE/OUTPT VISIT, EST, LEVL III, 20-29 MIN: ICD-10-PCS | Mod: TH,S$PBB,, | Performed by: ADVANCED PRACTICE MIDWIFE

## 2019-04-02 PROCEDURE — 86592 SYPHILIS TEST NON-TREP QUAL: CPT

## 2019-04-02 PROCEDURE — 76816 OB US FOLLOW-UP PER FETUS: CPT | Mod: 26,S$PBB,, | Performed by: OBSTETRICS & GYNECOLOGY

## 2019-04-02 PROCEDURE — 76816 PR  US,PREGNANT UTERUS,F/U,TRANSABD APP: ICD-10-PCS | Mod: 26,S$PBB,, | Performed by: OBSTETRICS & GYNECOLOGY

## 2019-04-02 PROCEDURE — 83020 HEMOGLOBIN ELECTROPHORESIS: CPT

## 2019-04-02 PROCEDURE — 99213 OFFICE O/P EST LOW 20 MIN: CPT | Mod: TH,S$PBB,, | Performed by: ADVANCED PRACTICE MIDWIFE

## 2019-04-02 PROCEDURE — 87340 HEPATITIS B SURFACE AG IA: CPT

## 2019-04-02 PROCEDURE — 76816 OB US FOLLOW-UP PER FETUS: CPT | Mod: PBBFAC,PO | Performed by: OBSTETRICS & GYNECOLOGY

## 2019-04-02 PROCEDURE — 86703 HIV-1/HIV-2 1 RESULT ANTBDY: CPT

## 2019-04-03 LAB
HBV SURFACE AG SERPL QL IA: NEGATIVE
HGB A2 MFR BLD HPLC: 2.9 % (ref 2.2–3.2)
HGB FRACT BLD ELPH-IMP: NORMAL
HGB FRACT BLD ELPH-IMP: NORMAL
HIV 1+2 AB+HIV1 P24 AG SERPL QL IA: NEGATIVE
RPR SER QL: NORMAL
RUBV IGG SER-ACNC: 31.4 IU/ML
RUBV IGG SER-IMP: REACTIVE

## 2019-04-03 NOTE — PROGRESS NOTES
Chief Complaint   Patient presents with    Initial Prenatal Visit       28 y.o., at 30w4d by Estimated Date of Delivery: 19    Complaints today: None    ROS  OBSTETRICS:   Contractions denies   Bleeding denies   Loss of fluid denies   Fetal mvmnt Reports good FM, reinforced BID  GASTRO:   Nausea none   Vomiting none      OB History    Para Term  AB Living   4 3 2 1   3   SAB TAB Ectopic Multiple Live Births         0 3      # Outcome Date GA Lbr Erick/2nd Weight Sex Delivery Anes PTL Lv   4 Current            3  17 36w0d   M Vag-Spont EPI  TANNER      Birth Comments: induced for pre - eclampsia      Complications: Severe pre-eclampsia   2 Term 14 39w0d  2.892 kg (6 lb 6 oz) F Vag-Spont EPI N TANNER      Complications: Gestational (pregnancy-induced) hypertension without significant proteinuria, first trimester   1 Term 11 37w0d  3.345 kg (7 lb 6 oz) F Vag-Spont  N TANNER      Obstetric Comments   Pt reports having 2 living children. Obstetric history initially had 4 NSVDs- updated on 3/8/2017 per patient report.        Dating reviewed  Allergies and problem list reviewed and updated  Medical and surgical history reviewed  Prenatal labs reviewed and updated    PHYSICAL EXAM  /82   Wt 100.9 kg (222 lb 7.1 oz)   BMI 43.44 kg/m²     GENERAL: No acute distress  NEURO: Alert and oriented x3  PSYCH: Normal mood and affect  PULMONARY: Non-labored respiration  ABDomen: Soft, gravid, nontender    ASSESSMENT AND PLAN    pregnancy Problems (from 19 to present)     No problems associated with this episode.          Discussed MFM recommendation for weekly PNT sec to CHTN  Discussed need to continue her HTN meds.  Discussed DM screen next visit.   labor precautions given  Follow-up: 2 weeks

## 2019-04-04 LAB
C TRACH DNA SPEC QL NAA+PROBE: NOT DETECTED
N GONORRHOEA DNA SPEC QL NAA+PROBE: NOT DETECTED

## 2019-04-08 ENCOUNTER — PATIENT MESSAGE (OUTPATIENT)
Dept: OBSTETRICS AND GYNECOLOGY | Facility: CLINIC | Age: 29
End: 2019-04-08

## 2019-04-08 RX ORDER — LABETALOL 100 MG/1
100 TABLET, FILM COATED ORAL 2 TIMES DAILY
Qty: 60 TABLET | Refills: 11 | Status: SHIPPED | OUTPATIENT
Start: 2019-04-08 | End: 2020-04-06 | Stop reason: SDUPTHER

## 2019-04-08 NOTE — TELEPHONE ENCOUNTER
Pt stating that labetalol 100 mg BID was not sent to pharmacy, I will send rx request to pharmacy on file.

## 2019-04-18 ENCOUNTER — PATIENT MESSAGE (OUTPATIENT)
Dept: OBSTETRICS AND GYNECOLOGY | Facility: CLINIC | Age: 29
End: 2019-04-18

## 2019-04-21 ENCOUNTER — PATIENT MESSAGE (OUTPATIENT)
Dept: OBSTETRICS AND GYNECOLOGY | Facility: CLINIC | Age: 29
End: 2019-04-21

## 2019-04-30 ENCOUNTER — ROUTINE PRENATAL (OUTPATIENT)
Dept: OBSTETRICS AND GYNECOLOGY | Facility: CLINIC | Age: 29
End: 2019-04-30
Payer: MEDICAID

## 2019-04-30 ENCOUNTER — PROCEDURE VISIT (OUTPATIENT)
Dept: MATERNAL FETAL MEDICINE | Facility: CLINIC | Age: 29
End: 2019-04-30
Payer: MEDICAID

## 2019-04-30 VITALS — DIASTOLIC BLOOD PRESSURE: 60 MMHG | BODY MASS INDEX: 43.7 KG/M2 | SYSTOLIC BLOOD PRESSURE: 110 MMHG | WEIGHT: 223.75 LBS

## 2019-04-30 DIAGNOSIS — Z13.1 DIABETES MELLITUS SCREENING: ICD-10-CM

## 2019-04-30 DIAGNOSIS — O09.93 HIGH-RISK PREGNANCY IN THIRD TRIMESTER: Primary | ICD-10-CM

## 2019-04-30 DIAGNOSIS — Z36.89 ENCOUNTER FOR ULTRASOUND TO CHECK FETAL GROWTH: Primary | ICD-10-CM

## 2019-04-30 DIAGNOSIS — O09.93 SUPERVISION OF HIGH RISK PREGNANCY IN THIRD TRIMESTER: ICD-10-CM

## 2019-04-30 PROBLEM — O99.213 OBESITY AFFECTING PREGNANCY IN THIRD TRIMESTER: Status: ACTIVE | Noted: 2019-04-30

## 2019-04-30 LAB
ESTIMATED AVG GLUCOSE: 103 MG/DL (ref 68–131)
HBA1C MFR BLD HPLC: 5.2 % (ref 4–5.6)
TSH SERPL DL<=0.005 MIU/L-ACNC: 1.18 UIU/ML (ref 0.4–4)

## 2019-04-30 PROCEDURE — 76816 OB US FOLLOW-UP PER FETUS: CPT | Mod: 26,S$PBB,, | Performed by: PEDIATRICS

## 2019-04-30 PROCEDURE — 76816 PR  US,PREGNANT UTERUS,F/U,TRANSABD APP: ICD-10-PCS | Mod: 26,S$PBB,, | Performed by: PEDIATRICS

## 2019-04-30 PROCEDURE — 84443 ASSAY THYROID STIM HORMONE: CPT

## 2019-04-30 PROCEDURE — 99212 OFFICE O/P EST SF 10 MIN: CPT | Mod: PBBFAC,TH,PO,25 | Performed by: STUDENT IN AN ORGANIZED HEALTH CARE EDUCATION/TRAINING PROGRAM

## 2019-04-30 PROCEDURE — 99499 NO LOS: ICD-10-PCS | Mod: S$PBB,,, | Performed by: PEDIATRICS

## 2019-04-30 PROCEDURE — 99999 PR PBB SHADOW E&M-EST. PATIENT-LVL II: CPT | Mod: PBBFAC,,, | Performed by: STUDENT IN AN ORGANIZED HEALTH CARE EDUCATION/TRAINING PROGRAM

## 2019-04-30 PROCEDURE — 99499 UNLISTED E&M SERVICE: CPT | Mod: S$PBB,,, | Performed by: PEDIATRICS

## 2019-04-30 PROCEDURE — 99213 PR OFFICE/OUTPT VISIT, EST, LEVL III, 20-29 MIN: ICD-10-PCS | Mod: TH,S$PBB,, | Performed by: STUDENT IN AN ORGANIZED HEALTH CARE EDUCATION/TRAINING PROGRAM

## 2019-04-30 PROCEDURE — 83036 HEMOGLOBIN GLYCOSYLATED A1C: CPT

## 2019-04-30 PROCEDURE — 76816 OB US FOLLOW-UP PER FETUS: CPT | Mod: PBBFAC,PO | Performed by: PEDIATRICS

## 2019-04-30 PROCEDURE — 99999 PR PBB SHADOW E&M-EST. PATIENT-LVL II: ICD-10-PCS | Mod: PBBFAC,,, | Performed by: STUDENT IN AN ORGANIZED HEALTH CARE EDUCATION/TRAINING PROGRAM

## 2019-04-30 PROCEDURE — 99213 OFFICE O/P EST LOW 20 MIN: CPT | Mod: TH,S$PBB,, | Performed by: STUDENT IN AN ORGANIZED HEALTH CARE EDUCATION/TRAINING PROGRAM

## 2019-04-30 NOTE — PROGRESS NOTES
Complaints today: Feeling miserable. States that she is tired and feels a lot of pelvic pressure. She is also complaining of intermittent palpitations that are a new complaint. She reports irregular contractions. No vaginal bleeding, leakage of fluid. Reports good fetal movement. She has been taking her labetalol. No vision changes, shortness of breath, RUQ pain. Endorses occasional headaches, but has not tried any medication for them. She plans to breastfeed. Desires BTL for contraception.    /60   Wt 101.5 kg (223 lb 12.3 oz)   BMI 43.70 kg/m²     28 y.o., at 34w3d by Estimated Date of Delivery: 19  Patient Active Problem List   Diagnosis    Morbid obesity with BMI of 40.0-44.9, adult    Chronic hypertension affecting pregnancy    Supervision of high risk pregnancy in third trimester    Obesity affecting pregnancy in third trimester     OB History    Para Term  AB Living   4 3 2 1   3   SAB TAB Ectopic Multiple Live Births         0 3      # Outcome Date GA Lbr Erick/2nd Weight Sex Delivery Anes PTL Lv   4 Current            3  17 36w0d   M Vag-Spont EPI  TANNER      Birth Comments: induced for pre - eclampsia      Complications: Severe pre-eclampsia   2 Term 14 39w0d  2.892 kg (6 lb 6 oz) F Vag-Spont EPI N TANNER      Complications: Gestational (pregnancy-induced) hypertension without significant proteinuria, first trimester   1 Term 11 37w0d  3.345 kg (7 lb 6 oz) F Vag-Spont  N TANNER      Obstetric Comments   Pt reports having 2 living children. Obstetric history initially had 4 NSVDs- updated on 3/8/2017 per patient report.        Dating reviewed    Allergies and problem list reviewed and updated    Medical and surgical history reviewed    Prenatal labs reviewed and updated    Physical Exam:  GEN: No distress.  CARDIO: Regular rate  PULM: Normal respiratory effort.  ABD: Soft, gravid, nontender  NEURO: Alert and oriented     Assessment:  27 yo  at 34w3d  presents for routine OB visit.    Plan:   1. A1c today as patient did not have OB glucose screen.  2. TSH, EKG for evaluation of palpitations.  3. BTL consents signed per patient's wishes.  4. Tdap today.  5. RTC in 1 week.    Belle Morales MD  OBGYN PGY-1

## 2019-05-01 ENCOUNTER — HOSPITAL ENCOUNTER (INPATIENT)
Facility: OTHER | Age: 29
LOS: 1 days | Discharge: HOME | End: 2019-05-02
Attending: OBSTETRICS & GYNECOLOGY | Admitting: OBSTETRICS & GYNECOLOGY
Payer: MEDICAID

## 2019-05-01 DIAGNOSIS — O99.213 OBESITY AFFECTING PREGNANCY IN THIRD TRIMESTER: ICD-10-CM

## 2019-05-01 DIAGNOSIS — O34.33 PREMATURE CERVICAL DILATION IN THIRD TRIMESTER: ICD-10-CM

## 2019-05-01 DIAGNOSIS — R07.9 CHEST PAIN: ICD-10-CM

## 2019-05-01 DIAGNOSIS — Z3A.34 34 WEEKS GESTATION OF PREGNANCY: ICD-10-CM

## 2019-05-01 DIAGNOSIS — O10.913 CHRONIC HYPERTENSION WITH EXACERBATION DURING PREGNANCY IN THIRD TRIMESTER: ICD-10-CM

## 2019-05-01 DIAGNOSIS — Z53.29 LEFT AGAINST MEDICAL ADVICE: ICD-10-CM

## 2019-05-01 DIAGNOSIS — E87.6 HYPOKALEMIA DUE TO LOSS OF POTASSIUM: ICD-10-CM

## 2019-05-01 DIAGNOSIS — O09.33 LIMITED PRENATAL CARE IN THIRD TRIMESTER: ICD-10-CM

## 2019-05-01 DIAGNOSIS — O14.13 SEVERE PRE-ECLAMPSIA IN THIRD TRIMESTER: ICD-10-CM

## 2019-05-01 DIAGNOSIS — O10.919 CHRONIC HYPERTENSION AFFECTING PREGNANCY: ICD-10-CM

## 2019-05-01 LAB
ALBUMIN SERPL BCP-MCNC: 3.2 G/DL (ref 3.5–5.2)
ALP SERPL-CCNC: 113 U/L (ref 55–135)
ALT SERPL W/O P-5'-P-CCNC: 7 U/L (ref 10–44)
ANION GAP SERPL CALC-SCNC: 11 MMOL/L (ref 8–16)
AST SERPL-CCNC: 12 U/L (ref 10–40)
BASOPHILS # BLD AUTO: 0.01 K/UL (ref 0–0.2)
BASOPHILS NFR BLD: 0.1 % (ref 0–1.9)
BILIRUB SERPL-MCNC: 0.5 MG/DL (ref 0.1–1)
BILIRUB SERPL-MCNC: NORMAL MG/DL
BLOOD URINE, POC: NORMAL
BUN SERPL-MCNC: 4 MG/DL (ref 6–20)
CALCIUM SERPL-MCNC: 9.2 MG/DL (ref 8.7–10.5)
CHLORIDE SERPL-SCNC: 107 MMOL/L (ref 95–110)
CO2 SERPL-SCNC: 22 MMOL/L (ref 23–29)
COLOR, POC UA: YELLOW
CREAT SERPL-MCNC: 0.6 MG/DL (ref 0.5–1.4)
CREAT UR-MCNC: 202.1 MG/DL (ref 15–325)
DIFFERENTIAL METHOD: ABNORMAL
EOSINOPHIL # BLD AUTO: 0 K/UL (ref 0–0.5)
EOSINOPHIL NFR BLD: 0.3 % (ref 0–8)
ERYTHROCYTE [DISTWIDTH] IN BLOOD BY AUTOMATED COUNT: 12.8 % (ref 11.5–14.5)
EST. GFR  (AFRICAN AMERICAN): >60 ML/MIN/1.73 M^2
EST. GFR  (NON AFRICAN AMERICAN): >60 ML/MIN/1.73 M^2
GLUCOSE SERPL-MCNC: 72 MG/DL (ref 70–110)
GLUCOSE UR QL STRIP: NORMAL
HCT VFR BLD AUTO: 34 % (ref 37–48.5)
HGB BLD-MCNC: 11.7 G/DL (ref 12–16)
KETONES UR QL STRIP: NORMAL
LEUKOCYTE ESTERASE URINE, POC: NORMAL
LYMPHOCYTES # BLD AUTO: 1.8 K/UL (ref 1–4.8)
LYMPHOCYTES NFR BLD: 17.6 % (ref 18–48)
MCH RBC QN AUTO: 29.2 PG (ref 27–31)
MCHC RBC AUTO-ENTMCNC: 34.4 G/DL (ref 32–36)
MCV RBC AUTO: 85 FL (ref 82–98)
MONOCYTES # BLD AUTO: 0.6 K/UL (ref 0.3–1)
MONOCYTES NFR BLD: 6.2 % (ref 4–15)
NEUTROPHILS # BLD AUTO: 7.7 K/UL (ref 1.8–7.7)
NEUTROPHILS NFR BLD: 75.3 % (ref 38–73)
NITRITE, POC UA: NORMAL
PH, POC UA: 7
PLATELET # BLD AUTO: 260 K/UL (ref 150–350)
PMV BLD AUTO: 10.5 FL (ref 9.2–12.9)
POTASSIUM SERPL-SCNC: 3.1 MMOL/L (ref 3.5–5.1)
PROT SERPL-MCNC: 7.1 G/DL (ref 6–8.4)
PROT UR-MCNC: 31 MG/DL (ref 0–15)
PROT/CREAT UR: 0.15 MG/G{CREAT} (ref 0–0.2)
PROTEIN, POC: NORMAL
RBC # BLD AUTO: 4.01 M/UL (ref 4–5.4)
SODIUM SERPL-SCNC: 140 MMOL/L (ref 136–145)
SPECIFIC GRAVITY, POC UA: 1.01
TROPONIN I SERPL DL<=0.01 NG/ML-MCNC: <0.006 NG/ML (ref 0–0.03)
UROBILINOGEN, POC UA: 4
WBC # BLD AUTO: 10.2 K/UL (ref 3.9–12.7)

## 2019-05-01 PROCEDURE — 99285 EMERGENCY DEPT VISIT HI MDM: CPT | Mod: 25

## 2019-05-01 PROCEDURE — 80053 COMPREHEN METABOLIC PANEL: CPT

## 2019-05-01 PROCEDURE — 84484 ASSAY OF TROPONIN QUANT: CPT

## 2019-05-01 PROCEDURE — 99284 PR EMERGENCY DEPT VISIT,LEVEL IV: ICD-10-PCS | Mod: 25,,, | Performed by: OBSTETRICS & GYNECOLOGY

## 2019-05-01 PROCEDURE — 99284 EMERGENCY DEPT VISIT MOD MDM: CPT | Mod: 25,,, | Performed by: OBSTETRICS & GYNECOLOGY

## 2019-05-01 PROCEDURE — 93010 EKG 12-LEAD: ICD-10-PCS | Mod: ,,, | Performed by: INTERNAL MEDICINE

## 2019-05-01 PROCEDURE — 96372 THER/PROPH/DIAG INJ SC/IM: CPT | Mod: 59

## 2019-05-01 PROCEDURE — 59025 FETAL NON-STRESS TEST: CPT | Mod: 26,,, | Performed by: OBSTETRICS & GYNECOLOGY

## 2019-05-01 PROCEDURE — 59025 PR FETAL 2N-STRESS TEST: ICD-10-PCS | Mod: 26,,, | Performed by: OBSTETRICS & GYNECOLOGY

## 2019-05-01 PROCEDURE — 11000001 HC ACUTE MED/SURG PRIVATE ROOM

## 2019-05-01 PROCEDURE — 63600175 PHARM REV CODE 636 W HCPCS: Performed by: OBSTETRICS & GYNECOLOGY

## 2019-05-01 PROCEDURE — 82570 ASSAY OF URINE CREATININE: CPT

## 2019-05-01 PROCEDURE — 93010 ELECTROCARDIOGRAM REPORT: CPT | Mod: ,,, | Performed by: INTERNAL MEDICINE

## 2019-05-01 PROCEDURE — 59025 FETAL NON-STRESS TEST: CPT

## 2019-05-01 PROCEDURE — 81002 URINALYSIS NONAUTO W/O SCOPE: CPT

## 2019-05-01 PROCEDURE — 85025 COMPLETE CBC W/AUTO DIFF WBC: CPT

## 2019-05-01 PROCEDURE — 93005 ELECTROCARDIOGRAM TRACING: CPT

## 2019-05-01 PROCEDURE — 25000003 PHARM REV CODE 250: Performed by: OBSTETRICS & GYNECOLOGY

## 2019-05-01 RX ORDER — POTASSIUM CHLORIDE 20 MEQ/1
40 TABLET, EXTENDED RELEASE ORAL ONCE
Status: DISCONTINUED | OUTPATIENT
Start: 2019-05-02 | End: 2019-05-02

## 2019-05-01 RX ORDER — NIFEDIPINE 10 MG/1
10 CAPSULE ORAL ONCE
Status: COMPLETED | OUTPATIENT
Start: 2019-05-01 | End: 2019-05-01

## 2019-05-01 RX ORDER — BETAMETHASONE SODIUM PHOSPHATE AND BETAMETHASONE ACETATE 3; 3 MG/ML; MG/ML
12 INJECTION, SUSPENSION INTRA-ARTICULAR; INTRALESIONAL; INTRAMUSCULAR; SOFT TISSUE ONCE
Status: COMPLETED | OUTPATIENT
Start: 2019-05-01 | End: 2019-05-01

## 2019-05-01 RX ORDER — BUTALBITAL, ACETAMINOPHEN AND CAFFEINE 50; 325; 40 MG/1; MG/1; MG/1
2 TABLET ORAL ONCE
Status: COMPLETED | OUTPATIENT
Start: 2019-05-01 | End: 2019-05-01

## 2019-05-01 RX ORDER — BETAMETHASONE SODIUM PHOSPHATE AND BETAMETHASONE ACETATE 3; 3 MG/ML; MG/ML
12 INJECTION, SUSPENSION INTRA-ARTICULAR; INTRALESIONAL; INTRAMUSCULAR; SOFT TISSUE ONCE
Status: DISCONTINUED | OUTPATIENT
Start: 2019-05-01 | End: 2019-05-01

## 2019-05-01 RX ADMIN — BETAMETHASONE SODIUM PHOSPHATE AND BETAMETHASONE ACETATE 12 MG: 3; 3 INJECTION, SUSPENSION INTRA-ARTICULAR; INTRALESIONAL; INTRAMUSCULAR at 11:05

## 2019-05-01 RX ADMIN — NIFEDIPINE 10 MG: 10 CAPSULE ORAL at 11:05

## 2019-05-01 RX ADMIN — BUTALBITAL, ACETAMINOPHEN AND CAFFEINE 2 TABLET: 50; 325; 40 TABLET ORAL at 11:05

## 2019-05-01 NOTE — PROGRESS NOTES
"Indication  ========    Follow-up evaluation for fetal growth. Follow-up evaluation of anatomy.    History  ======    General History  Other: CHTN  BMI 41.6  Previous Outcomes  Preg. no. 1  Outcome: Live YOB: 2010  Gest. age 37 w + 0 d  Gender: female  Details:  7 lb 6 oz  Preg. no. 2  Outcome: Live YOB: 2013  Gest. age 39 w + 0 d  Gender: female  Details:  6 lb 6 oz  Preg. no. 3  Outcome: Live YOB: 2016  Gest. age 36 w + 0 d  Gender: male  Details:  5 lb induction for elevated BP   4  Para 3  Lewis children born living (T) 2  Lewis children born (T) 2  Lewis children born (P) 1  Lewis living children (L) 3  Lewis children born living (P) 1  Risk Factors  Details: CHTN  Details: BMI 41.6    Pregnancy History  ==============    Maternal Lab Tests  Test: Cell free fetal DNA analysis  Result:  NIPT NEGATIVE    Wants to know gender: yes    Method  ======    Transabdominal ultrasound examination, 2D Color Doppler, Voluson S8. View: Good view.    Pregnancy  =========    Lewis pregnancy. Number of fetuses: 1.    Dating  ======    Cycle: regular cycle  GA by "stated dating" 34 w + 3 d  ZAINAB by "stated dating": 2019  Ultrasound examination on: 2019  GA by U/S based upon: AC, BPD, Femur, HC  GA by U/S 32 w + 5 d  ZAINAB by U/S: 2019  Assigned: Dating performed on 2019, based on the external assessment  Assigned GA 34 w + 3 d  Assigned ZAINAB: 2019    General Evaluation  ==============    Cardiac activity: present.  bpm.  Fetal movements: visualized.  Presentation: cephalic.  Placenta: posterior.  Amniotic fluid: Amount of AF: normal amount. MVP 5.2 cm.    Fetal Biometry  ============    Fetal Biometry  BPD 81.9 mm 32w 6d Hadlock  .8 mm 33w 6d Pippa  .9 mm 32w 4d Hadlock  .1 mm 33w 1d Hadlock  Femur 62.5 mm 32w 3d Hadlock  EFW 2,068 g 11% Isaiah  Calculated by: Hadlock (BPD-HC-AC-FL)  EFW (lb) 4 lb  EFW " (oz) 9 oz  Cephalic index 0.79  HC / AC 1.01  FL / BPD 0.76  FL / HC 0.21  FL / AC 0.21  MVP 5.2 cm   bpm    Fetal Anatomy  ============    Cranium: normal  3-vessel view: normal  3-vessel-trachea view: normal  Stomach: normal  Kidneys: normal  Bladder: normal  Wants to know gender: yes        Impression  =========    Fetal size is AGA with the EFW at the 11th percentile.  Normal repeat limited fetal anatomic survey.  AFV is normal.            Recommendation  ==============    Patient will be seen in 3 weeks for growth; if less than 10th, deliver at 39 weeks' with twice weekly testing until delivery.    Thank you again for allowing us to participate in the care of your patients. If you have any questions concerning today's consultation, feel free  to contact me or one of my partners. We can be reached at [phone] during normal business hours. If you have a question after normal  business hours, please contact Labor and Delivery at [phone].

## 2019-05-02 ENCOUNTER — ANESTHESIA EVENT (OUTPATIENT)
Dept: OBSTETRICS AND GYNECOLOGY | Facility: OTHER | Age: 29
End: 2019-05-02
Payer: MEDICAID

## 2019-05-02 ENCOUNTER — ANESTHESIA (OUTPATIENT)
Dept: OBSTETRICS AND GYNECOLOGY | Facility: OTHER | Age: 29
End: 2019-05-02
Payer: MEDICAID

## 2019-05-02 VITALS
BODY MASS INDEX: 41.04 KG/M2 | OXYGEN SATURATION: 100 % | HEIGHT: 62 IN | TEMPERATURE: 98 F | HEART RATE: 85 BPM | DIASTOLIC BLOOD PRESSURE: 83 MMHG | RESPIRATION RATE: 18 BRPM | SYSTOLIC BLOOD PRESSURE: 130 MMHG | WEIGHT: 223 LBS

## 2019-05-02 PROBLEM — Z53.29 LEFT AGAINST MEDICAL ADVICE: Status: ACTIVE | Noted: 2019-05-02

## 2019-05-02 PROBLEM — O34.33 PREMATURE CERVICAL DILATION IN THIRD TRIMESTER: Status: RESOLVED | Noted: 2019-05-01 | Resolved: 2019-05-02

## 2019-05-02 PROBLEM — O09.33 LIMITED PRENATAL CARE IN THIRD TRIMESTER: Status: RESOLVED | Noted: 2019-05-01 | Resolved: 2019-05-02

## 2019-05-02 PROBLEM — O14.13 SEVERE PRE-ECLAMPSIA IN THIRD TRIMESTER: Status: ACTIVE | Noted: 2019-05-02

## 2019-05-02 PROBLEM — Z3A.34 34 WEEKS GESTATION OF PREGNANCY: Status: RESOLVED | Noted: 2019-05-01 | Resolved: 2019-05-02

## 2019-05-02 LAB
ABO + RH BLD: NORMAL
BASOPHILS # BLD AUTO: 0.01 K/UL (ref 0–0.2)
BASOPHILS NFR BLD: 0.1 % (ref 0–1.9)
BLD GP AB SCN CELLS X3 SERPL QL: NORMAL
DIFFERENTIAL METHOD: ABNORMAL
EOSINOPHIL # BLD AUTO: 0 K/UL (ref 0–0.5)
EOSINOPHIL NFR BLD: 0.1 % (ref 0–8)
ERYTHROCYTE [DISTWIDTH] IN BLOOD BY AUTOMATED COUNT: 13 % (ref 11.5–14.5)
HCT VFR BLD AUTO: 36.4 % (ref 37–48.5)
HGB BLD-MCNC: 12.3 G/DL (ref 12–16)
LYMPHOCYTES # BLD AUTO: 1.5 K/UL (ref 1–4.8)
LYMPHOCYTES NFR BLD: 11.1 % (ref 18–48)
MCH RBC QN AUTO: 28.8 PG (ref 27–31)
MCHC RBC AUTO-ENTMCNC: 33.8 G/DL (ref 32–36)
MCV RBC AUTO: 85 FL (ref 82–98)
MONOCYTES # BLD AUTO: 0.4 K/UL (ref 0.3–1)
MONOCYTES NFR BLD: 3.2 % (ref 4–15)
NEUTROPHILS # BLD AUTO: 11.1 K/UL (ref 1.8–7.7)
NEUTROPHILS NFR BLD: 84.9 % (ref 38–73)
PLATELET # BLD AUTO: 278 K/UL (ref 150–350)
PMV BLD AUTO: 10.3 FL (ref 9.2–12.9)
RBC # BLD AUTO: 4.27 M/UL (ref 4–5.4)
WBC # BLD AUTO: 13.01 K/UL (ref 3.9–12.7)

## 2019-05-02 PROCEDURE — 0502F SUBSEQUENT PRENATAL CARE: CPT | Mod: ,,, | Performed by: OBSTETRICS & GYNECOLOGY

## 2019-05-02 PROCEDURE — 63600175 PHARM REV CODE 636 W HCPCS: Performed by: STUDENT IN AN ORGANIZED HEALTH CARE EDUCATION/TRAINING PROGRAM

## 2019-05-02 PROCEDURE — 72100002 HC LABOR CARE, 1ST 8 HOURS

## 2019-05-02 PROCEDURE — 25000003 PHARM REV CODE 250: Performed by: STUDENT IN AN ORGANIZED HEALTH CARE EDUCATION/TRAINING PROGRAM

## 2019-05-02 PROCEDURE — 63600175 PHARM REV CODE 636 W HCPCS: Performed by: OBSTETRICS & GYNECOLOGY

## 2019-05-02 PROCEDURE — 86850 RBC ANTIBODY SCREEN: CPT

## 2019-05-02 PROCEDURE — 59409 OBSTETRICAL CARE: CPT | Mod: AA,,, | Performed by: ANESTHESIOLOGY

## 2019-05-02 PROCEDURE — 59409 PRA ETRICAL CARE,VAG DELIV ONLY: ICD-10-PCS | Mod: AA,,, | Performed by: ANESTHESIOLOGY

## 2019-05-02 PROCEDURE — 25000003 PHARM REV CODE 250: Performed by: ANESTHESIOLOGY

## 2019-05-02 PROCEDURE — 51702 INSERT TEMP BLADDER CATH: CPT

## 2019-05-02 PROCEDURE — 25000003 PHARM REV CODE 250: Performed by: OBSTETRICS & GYNECOLOGY

## 2019-05-02 PROCEDURE — 59409 OBSTETRICAL CARE: CPT | Mod: AT,,, | Performed by: OBSTETRICS & GYNECOLOGY

## 2019-05-02 PROCEDURE — 59409 PR OBSTETRICAL CARE,VAG DELIV ONLY: ICD-10-PCS | Mod: AT,,, | Performed by: OBSTETRICS & GYNECOLOGY

## 2019-05-02 PROCEDURE — 87147 CULTURE TYPE IMMUNOLOGIC: CPT

## 2019-05-02 PROCEDURE — 11000001 HC ACUTE MED/SURG PRIVATE ROOM

## 2019-05-02 PROCEDURE — 87081 CULTURE SCREEN ONLY: CPT

## 2019-05-02 PROCEDURE — 27200710 HC EPIDURAL INFUSION PUMP SET: Performed by: ANESTHESIOLOGY

## 2019-05-02 PROCEDURE — 85025 COMPLETE CBC W/AUTO DIFF WBC: CPT

## 2019-05-02 PROCEDURE — 87184 SC STD DISK METHOD PER PLATE: CPT

## 2019-05-02 PROCEDURE — 27800517 HC TRAY,EPIDURAL-CONTINUOUS: Performed by: ANESTHESIOLOGY

## 2019-05-02 PROCEDURE — 72200006 HC VAGINAL DELIVERY LEVEL III

## 2019-05-02 PROCEDURE — 72100003 HC LABOR CARE, EA. ADDL. 8 HRS

## 2019-05-02 PROCEDURE — 0502F PR SUBSEQUENT PRENATAL CARE: ICD-10-PCS | Mod: ,,, | Performed by: OBSTETRICS & GYNECOLOGY

## 2019-05-02 PROCEDURE — 36415 COLL VENOUS BLD VENIPUNCTURE: CPT

## 2019-05-02 PROCEDURE — 62326 NJX INTERLAMINAR LMBR/SAC: CPT | Performed by: ANESTHESIOLOGY

## 2019-05-02 RX ORDER — IBUPROFEN 600 MG/1
600 TABLET ORAL EVERY 6 HOURS PRN
Qty: 30 TABLET | Refills: 0 | Status: SHIPPED | OUTPATIENT
Start: 2019-05-02 | End: 2020-03-10

## 2019-05-02 RX ORDER — LIDOCAINE HYDROCHLORIDE AND EPINEPHRINE 15; 5 MG/ML; UG/ML
INJECTION, SOLUTION EPIDURAL
Status: DISCONTINUED | OUTPATIENT
Start: 2019-05-02 | End: 2019-05-02

## 2019-05-02 RX ORDER — ACETAMINOPHEN 325 MG/1
650 TABLET ORAL EVERY 6 HOURS PRN
Status: DISCONTINUED | OUTPATIENT
Start: 2019-05-02 | End: 2019-05-03 | Stop reason: HOSPADM

## 2019-05-02 RX ORDER — CALCIUM GLUCONATE 98 MG/ML
1 INJECTION, SOLUTION INTRAVENOUS
Status: DISCONTINUED | OUTPATIENT
Start: 2019-05-02 | End: 2019-05-03 | Stop reason: HOSPADM

## 2019-05-02 RX ORDER — SIMETHICONE 80 MG
1 TABLET,CHEWABLE ORAL EVERY 6 HOURS PRN
Status: DISCONTINUED | OUTPATIENT
Start: 2019-05-02 | End: 2019-05-03 | Stop reason: HOSPADM

## 2019-05-02 RX ORDER — SODIUM CHLORIDE, SODIUM LACTATE, POTASSIUM CHLORIDE, CALCIUM CHLORIDE 600; 310; 30; 20 MG/100ML; MG/100ML; MG/100ML; MG/100ML
1000 INJECTION, SOLUTION INTRAVENOUS CONTINUOUS
Status: ACTIVE | OUTPATIENT
Start: 2019-05-02 | End: 2019-05-02

## 2019-05-02 RX ORDER — HYDROCODONE BITARTRATE AND ACETAMINOPHEN 5; 325 MG/1; MG/1
1 TABLET ORAL EVERY 4 HOURS PRN
Status: DISCONTINUED | OUTPATIENT
Start: 2019-05-02 | End: 2019-05-03 | Stop reason: HOSPADM

## 2019-05-02 RX ORDER — BUTALBITAL, ACETAMINOPHEN AND CAFFEINE 50; 325; 40 MG/1; MG/1; MG/1
1 TABLET ORAL EVERY 6 HOURS PRN
Status: DISCONTINUED | OUTPATIENT
Start: 2019-05-02 | End: 2019-05-03 | Stop reason: HOSPADM

## 2019-05-02 RX ORDER — POTASSIUM CHLORIDE 20 MEQ/1
40 TABLET, EXTENDED RELEASE ORAL ONCE
Status: COMPLETED | OUTPATIENT
Start: 2019-05-02 | End: 2019-05-02

## 2019-05-02 RX ORDER — SODIUM CHLORIDE, SODIUM LACTATE, POTASSIUM CHLORIDE, CALCIUM CHLORIDE 600; 310; 30; 20 MG/100ML; MG/100ML; MG/100ML; MG/100ML
INJECTION, SOLUTION INTRAVENOUS CONTINUOUS
Status: DISCONTINUED | OUTPATIENT
Start: 2019-05-02 | End: 2019-05-03 | Stop reason: HOSPADM

## 2019-05-02 RX ORDER — IBUPROFEN 600 MG/1
600 TABLET ORAL EVERY 6 HOURS PRN
Status: DISCONTINUED | OUTPATIENT
Start: 2019-05-02 | End: 2019-05-03 | Stop reason: HOSPADM

## 2019-05-02 RX ORDER — FENTANYL/BUPIVACAINE/NS/PF 2MCG/ML-.1
10 PLASTIC BAG, INJECTION (ML) INJECTION CONTINUOUS
Status: CANCELLED | OUTPATIENT
Start: 2019-05-02

## 2019-05-02 RX ORDER — DIPHENHYDRAMINE HCL 25 MG
25 CAPSULE ORAL EVERY 4 HOURS PRN
Status: DISCONTINUED | OUTPATIENT
Start: 2019-05-02 | End: 2019-05-03 | Stop reason: HOSPADM

## 2019-05-02 RX ORDER — HYDROCORTISONE 25 MG/G
CREAM TOPICAL 3 TIMES DAILY PRN
Status: DISCONTINUED | OUTPATIENT
Start: 2019-05-02 | End: 2019-05-03 | Stop reason: HOSPADM

## 2019-05-02 RX ORDER — PRENATAL WITH FERROUS FUM AND FOLIC ACID 3080; 920; 120; 400; 22; 1.84; 3; 20; 10; 1; 12; 200; 27; 25; 2 [IU]/1; [IU]/1; MG/1; [IU]/1; MG/1; MG/1; MG/1; MG/1; MG/1; MG/1; UG/1; MG/1; MG/1; MG/1; MG/1
1 TABLET ORAL DAILY
Status: DISCONTINUED | OUTPATIENT
Start: 2019-05-02 | End: 2019-05-03 | Stop reason: HOSPADM

## 2019-05-02 RX ORDER — NAPROXEN SODIUM 220 MG/1
81 TABLET, FILM COATED ORAL DAILY
Status: DISCONTINUED | OUTPATIENT
Start: 2019-05-02 | End: 2019-05-03 | Stop reason: HOSPADM

## 2019-05-02 RX ORDER — HYDROCODONE BITARTRATE AND ACETAMINOPHEN 10; 325 MG/1; MG/1
1 TABLET ORAL EVERY 4 HOURS PRN
Status: DISCONTINUED | OUTPATIENT
Start: 2019-05-02 | End: 2019-05-03 | Stop reason: HOSPADM

## 2019-05-02 RX ORDER — DOCUSATE SODIUM 100 MG/1
200 CAPSULE, LIQUID FILLED ORAL 2 TIMES DAILY PRN
Status: DISCONTINUED | OUTPATIENT
Start: 2019-05-02 | End: 2019-05-03 | Stop reason: HOSPADM

## 2019-05-02 RX ORDER — OXYTOCIN/RINGER'S LACTATE 20/1000 ML
41.65 PLASTIC BAG, INJECTION (ML) INTRAVENOUS CONTINUOUS
Status: DISCONTINUED | OUTPATIENT
Start: 2019-05-02 | End: 2019-05-03 | Stop reason: HOSPADM

## 2019-05-02 RX ORDER — DIPHENHYDRAMINE HYDROCHLORIDE 50 MG/ML
25 INJECTION INTRAMUSCULAR; INTRAVENOUS EVERY 4 HOURS PRN
Status: DISCONTINUED | OUTPATIENT
Start: 2019-05-02 | End: 2019-05-03 | Stop reason: HOSPADM

## 2019-05-02 RX ORDER — MAGNESIUM SULFATE HEPTAHYDRATE 40 MG/ML
4 INJECTION, SOLUTION INTRAVENOUS ONCE
Status: COMPLETED | OUTPATIENT
Start: 2019-05-02 | End: 2019-05-02

## 2019-05-02 RX ORDER — OXYTOCIN/RINGER'S LACTATE 20/1000 ML
2 PLASTIC BAG, INJECTION (ML) INTRAVENOUS CONTINUOUS
Status: DISCONTINUED | OUTPATIENT
Start: 2019-05-02 | End: 2019-05-03 | Stop reason: HOSPADM

## 2019-05-02 RX ORDER — AMOXICILLIN 250 MG
1 CAPSULE ORAL NIGHTLY PRN
Status: DISCONTINUED | OUTPATIENT
Start: 2019-05-02 | End: 2019-05-03 | Stop reason: HOSPADM

## 2019-05-02 RX ORDER — ONDANSETRON 8 MG/1
8 TABLET, ORALLY DISINTEGRATING ORAL EVERY 8 HOURS PRN
Status: DISCONTINUED | OUTPATIENT
Start: 2019-05-02 | End: 2019-05-03 | Stop reason: HOSPADM

## 2019-05-02 RX ORDER — PROCHLORPERAZINE MALEATE 5 MG
10 TABLET ORAL ONCE
Status: COMPLETED | OUTPATIENT
Start: 2019-05-02 | End: 2019-05-02

## 2019-05-02 RX ORDER — LABETALOL 100 MG/1
100 TABLET, FILM COATED ORAL 2 TIMES DAILY
Status: DISCONTINUED | OUTPATIENT
Start: 2019-05-02 | End: 2019-05-03 | Stop reason: HOSPADM

## 2019-05-02 RX ORDER — FENTANYL/BUPIVACAINE/NS/PF 2MCG/ML-.1
PLASTIC BAG, INJECTION (ML) INJECTION CONTINUOUS PRN
Status: DISCONTINUED | OUTPATIENT
Start: 2019-05-02 | End: 2019-05-02

## 2019-05-02 RX ORDER — MAGNESIUM SULFATE HEPTAHYDRATE 40 MG/ML
2 INJECTION, SOLUTION INTRAVENOUS CONTINUOUS
Status: DISCONTINUED | OUTPATIENT
Start: 2019-05-02 | End: 2019-05-03 | Stop reason: HOSPADM

## 2019-05-02 RX ADMIN — PROCHLORPERAZINE MALEATE 10 MG: 5 TABLET, FILM COATED ORAL at 06:05

## 2019-05-02 RX ADMIN — Medication 5 ML: at 01:05

## 2019-05-02 RX ADMIN — DEXTROSE 2.5 MILLION UNITS: 50 INJECTION, SOLUTION INTRAVENOUS at 10:05

## 2019-05-02 RX ADMIN — ACETAMINOPHEN 650 MG: 325 TABLET ORAL at 01:05

## 2019-05-02 RX ADMIN — Medication 2 MILLI-UNITS/MIN: at 05:05

## 2019-05-02 RX ADMIN — LIDOCAINE HYDROCHLORIDE,EPINEPHRINE BITARTRATE 3 ML: 15; .005 INJECTION, SOLUTION EPIDURAL; INFILTRATION; INTRACAUDAL; PERINEURAL at 01:05

## 2019-05-02 RX ADMIN — POTASSIUM CHLORIDE 40 MEQ: 1500 TABLET, EXTENDED RELEASE ORAL at 12:05

## 2019-05-02 RX ADMIN — BUTALBITAL, ACETAMINOPHEN AND CAFFEINE 1 TABLET: 50; 325; 40 TABLET ORAL at 04:05

## 2019-05-02 RX ADMIN — PENICILLIN G POTASSIUM 5 MILLION UNITS: 5000000 POWDER, FOR SOLUTION INTRAMUSCULAR; INTRAPLEURAL; INTRATHECAL; INTRAVENOUS at 05:05

## 2019-05-02 RX ADMIN — MAGNESIUM SULFATE HEPTAHYDRATE 2 G/HR: 40 INJECTION, SOLUTION INTRAVENOUS at 05:05

## 2019-05-02 RX ADMIN — LABETALOL HYDROCHLORIDE 100 MG: 100 TABLET, FILM COATED ORAL at 08:05

## 2019-05-02 RX ADMIN — DEXTROSE 2.5 MILLION UNITS: 50 INJECTION, SOLUTION INTRAVENOUS at 02:05

## 2019-05-02 RX ADMIN — SODIUM CHLORIDE, SODIUM LACTATE, POTASSIUM CHLORIDE, AND CALCIUM CHLORIDE 1000 ML: .6; .31; .03; .02 INJECTION, SOLUTION INTRAVENOUS at 04:05

## 2019-05-02 RX ADMIN — MAGNESIUM SULFATE HEPTAHYDRATE 4 G: 40 INJECTION, SOLUTION INTRAVENOUS at 04:05

## 2019-05-02 RX ADMIN — Medication 10 ML/HR: at 01:05

## 2019-05-02 NOTE — ANESTHESIA PROCEDURE NOTES
Epidural    Patient location during procedure: OB   Reason for block: primary anesthetic   Diagnosis: Intrauterine Pregnancy    Start time: 5/2/2019 1:30 PM  Timeout: 5/2/2019 1:25 PM  End time: 5/2/2019 1:38 PM  Surgery related to: Intratuterine Pregnancy  Staffing  Anesthesiologist: Maria Del Carmen Aponte MD  Resident/CRNA: Priya Matta MD  Other anesthesia staff: Audi Hou MD  Performed: other anesthesia staff   Preanesthetic Checklist  Completed: patient identified, site marked, surgical consent, pre-op evaluation, timeout performed, IV checked, risks and benefits discussed, monitors and equipment checked, anesthesia consent given, hand hygiene performed and patient being monitored  Preparation  Patient position: sitting  Prep: ChloraPrep  Patient monitoring: Pulse Ox and Blood Pressure  Epidural  Skin Anesthetic: lidocaine 1%  Skin Wheal: 3 mL  Administration type: single shot  Approach: midline  Interspace: L3-4    Injection technique: ABI air  Needle and Epidural Catheter  Needle type: Tuohy   Needle gauge: 17  Needle length: 3.5 inches  Needle insertion depth: 7 cm  Catheter type: springwound and multi-orifice  Catheter size: 19 G  Catheter at skin depth: 11 cm  Test dose: 3 mL of lidocaine 1.5% with Epi 1-to-200,000  Additional Documentation: incremental injection, negative aspiration for heme and CSF, no paresthesia on injection, no signs/symptoms of IV or SA injection, no significant pain on injection and no significant complaints from patient  Needle localization: anatomical landmarks  Assessment  Ease of block: easy  Patient's tolerance of the procedure: comfortable throughout block and no complaintsNo inadvertent dural puncture with Tuohy.  Dural puncture performed with spinal needle.

## 2019-05-02 NOTE — PROGRESS NOTES
"MAG NOTE/Labor note      Macie Guzman is a 28 y.o. female with CHTN with SI preE.     Patient denies headaches, denies blurry vision and denies right upper quadrant pain. denies CP, denies SOB. Patient reports no nausea/no vomiting.     Objective:       BP (!) 143/75   Pulse 82   Temp 98 °F (36.7 °C)   Resp 16   Ht 5' 2" (1.575 m)   Wt 101.2 kg (223 lb)   SpO2 99%   Breastfeeding? No   BMI 40.79 kg/m²   Vitals:    05/02/19 0910 05/02/19 0940 05/02/19 1010 05/02/19 1035   BP: (!) 143/75 (!) 144/77 133/73 (!) 143/75   Pulse: 86 84 84 82   Resp:       Temp:       TempSrc:       SpO2: 96% 99% 98% 99%   Weight:       Height:             I/O last 3 completed shifts:  In: 263.1 [I.V.:163.1; IV Piggyback:100]  Out: 300 [Urine:300]    No intake/output data recorded.          General:   alert, appears stated age and cooperative   Lungs:   clear to auscultation bilaterally   Heart:   regular rate and rhythm, S1, S2 normal, no murmur, click, rub or gallop   Abdomen:  soft, non-tender; bowel sounds normal; no masses,  no organomegaly       Extremities: peripheral pulses normal, no pedal edema, no clubbing or cyanosis   Reflexes - 2+  bilaterally     SVE: 6/80/-2   FHTs: 130, Cat 2, reassuring    Lab Review  Recent Results (from the past 48 hour(s))   Hemoglobin A1c    Collection Time: 04/30/19  1:59 PM   Result Value Ref Range    Hemoglobin A1C 5.2 4.0 - 5.6 %    Estimated Avg Glucose 103 68 - 131 mg/dL   TSH    Collection Time: 04/30/19  1:59 PM   Result Value Ref Range    TSH 1.175 0.400 - 4.000 uIU/mL   POCT urinalysis, dipstick or tablet reag    Collection Time: 05/01/19 10:34 PM   Result Value Ref Range    Color, UA yellow     Spec Grav UA 1.010     pH, UA 7     WBC, UA neg     Nitrite, UA neg     Protein trace     Glucose, UA normal     Ketones, UA neg     Urobilinogen, UA 4     Bilirubin neg     Blood, UA neg    Protein / creatinine ratio, urine    Collection Time: 05/01/19 10:54 PM   Result Value Ref Range    " Protein, Urine Random 31 (H) 0 - 15 mg/dL    Creatinine, Random Ur 202.1 15.0 - 325.0 mg/dL    Prot/Creat Ratio, Ur 0.15 0.00 - 0.20   Comprehensive metabolic panel    Collection Time: 19 10:55 PM   Result Value Ref Range    Sodium 140 136 - 145 mmol/L    Potassium 3.1 (L) 3.5 - 5.1 mmol/L    Chloride 107 95 - 110 mmol/L    CO2 22 (L) 23 - 29 mmol/L    Glucose 72 70 - 110 mg/dL    BUN, Bld 4 (L) 6 - 20 mg/dL    Creatinine 0.6 0.5 - 1.4 mg/dL    Calcium 9.2 8.7 - 10.5 mg/dL    Total Protein 7.1 6.0 - 8.4 g/dL    Albumin 3.2 (L) 3.5 - 5.2 g/dL    Total Bilirubin 0.5 0.1 - 1.0 mg/dL    Alkaline Phosphatase 113 55 - 135 U/L    AST 12 10 - 40 U/L    ALT 7 (L) 10 - 44 U/L    Anion Gap 11 8 - 16 mmol/L    eGFR if African American >60 >60 mL/min/1.73 m^2    eGFR if non African American >60 >60 mL/min/1.73 m^2   CBC auto differential    Collection Time: 19 10:55 PM   Result Value Ref Range    WBC 10.20 3.90 - 12.70 K/uL    RBC 4.01 4.00 - 5.40 M/uL    Hemoglobin 11.7 (L) 12.0 - 16.0 g/dL    Hematocrit 34.0 (L) 37.0 - 48.5 %    Mean Corpuscular Volume 85 82 - 98 fL    Mean Corpuscular Hemoglobin 29.2 27.0 - 31.0 pg    Mean Corpuscular Hemoglobin Conc 34.4 32.0 - 36.0 g/dL    RDW 12.8 11.5 - 14.5 %    Platelets 260 150 - 350 K/uL    MPV 10.5 9.2 - 12.9 fL    Gran # (ANC) 7.7 1.8 - 7.7 K/uL    Lymph # 1.8 1.0 - 4.8 K/uL    Mono # 0.6 0.3 - 1.0 K/uL    Eos # 0.0 0.0 - 0.5 K/uL    Baso # 0.01 0.00 - 0.20 K/uL    Gran% 75.3 (H) 38.0 - 73.0 %    Lymph% 17.6 (L) 18.0 - 48.0 %    Mono% 6.2 4.0 - 15.0 %    Eosinophil% 0.3 0.0 - 8.0 %    Basophil% 0.1 0.0 - 1.9 %    Differential Method Automated    Troponin I    Collection Time: 19 10:55 PM   Result Value Ref Range    Troponin I <0.006 0.000 - 0.026 ng/mL   Type & Screen    Collection Time: 19  6:52 AM   Result Value Ref Range    Group & Rh O POS     Indirect Martha NEG           Assessment:     28 y.o.  female , on magnesium sulfate    Active Hospital  Problems    Diagnosis  POA    *Chronic hypertension with exacerbation during pregnancy in third trimester [O10.913]  Yes    Severe pre-eclampsia in third trimester [O14.13]  Unknown    Limited prenatal care in third trimester [O09.33]  Not Applicable    Premature cervical dilation in third trimester [O34.33]  Yes    Hypokalemia due to loss of potassium [E87.6]  Yes    34 weeks gestation of pregnancy [Z3A.34]  Not Applicable      Resolved Hospital Problems   No resolved problems to display.        Plan:   1. Continue magnesium sulfate, no signs of toxicity at this time  2. Monitor for s/s of eclampsia  3. Close maternal monitoring including UOP and BP,   - BP: (133-168)/(72-94) 143/75  - UOP adequate, RN needs to reupdate   4. Recheck in 2-4 hours or PRN      Loretta VALDERRAMA  PGY2

## 2019-05-02 NOTE — ED PROVIDER NOTES
"Encounter Date: 2019       History     Chief Complaint   Patient presents with    Chest Pain     complains of chest tightness    Shortness of Breath     Macie Guzman is a 28 y.o. [id number] at 34w4d presents complaining of chest pain, headache. Reports pain is located in her sternum and radiates to her right arm. Started at 2100 this evening. Pain is a "sharp pain". Headache is not associated with visual changes. She also complains that she feels like she can't catch her breath.  She is also complaining of clear vaginal discharge that has been present since right before she took a bath a few hours ago, denies "large gush" of fluid. She also feels she has been having contractions since 2100.    She was seen in clinic with a sensation of irregular heartbeat and TSH was drawn which was normal, an EKG was ordered but the patient didn't follow up to obtain it.       This IUP is complicated by hypertension, and a prior history of pre-eclampsia in her last delivery at 36 weeks.  Patient denies vaginal bleeding, and LOF.   Fetal Movement: normal.          Review of patient's allergies indicates:  No Known Allergies  Past Medical History:   Diagnosis Date    Hypertension      No past surgical history on file.  Family History   Problem Relation Age of Onset    Colon cancer Neg Hx     Eclampsia Neg Hx     Breast cancer Neg Hx     Ovarian cancer Neg Hx     Cancer Neg Hx      Social History     Tobacco Use    Smoking status: Never Smoker    Smokeless tobacco: Never Used   Substance Use Topics    Alcohol use: No    Drug use: No     Review of Systems   Constitutional: Negative for chills, fatigue and fever.   HENT: Negative for congestion.    Eyes: Negative for visual disturbance.   Respiratory: Positive for shortness of breath. Negative for cough.    Cardiovascular: Negative for chest pain and palpitations.   Gastrointestinal: Negative for abdominal distention, abdominal pain, constipation, diarrhea, nausea and " vomiting.   Genitourinary: Positive for vaginal discharge. Negative for difficulty urinating, dysuria, hematuria and vaginal bleeding.   Skin: Negative for rash.   Neurological: Negative for dizziness, seizures, light-headedness and headaches.   Hematological: Does not bruise/bleed easily.   Psychiatric/Behavioral: Negative for dysphoric mood. The patient is not nervous/anxious.        Physical Exam     Initial Vitals [05/01/19 2220]   BP Pulse Resp Temp SpO2   (!) 155/93 85 16 -- 100 %      MAP       --       /80   Pulse 95   Resp 16   SpO2 99%     Physical Exam    Constitutional: She appears well-developed and well-nourished. No distress.   HENT:   Head: Normocephalic and atraumatic.   Cardiovascular: Normal rate, regular rhythm and intact distal pulses.   No murmur heard.  Pulmonary/Chest: No respiratory distress.   Neurological: She is alert and oriented to person, place, and time.   Psychiatric: She has a normal mood and affect.     OB LABOR EXAM:   Pre-Term Labor: No.   Membranes ruptured: No.   Method: Sterile vaginal exam per MD and Sterile speculum exam per MD.   Vaginal Bleeding: none present.   Engagement: ballotable/floating.   Dilatation: 4.   Station: -4.   Effacement: 40%.       Comments: Cervix is multiparous and soft       ED Course   Obtain Fetal nonstress test (NST)  Date/Time: 5/1/2019 11:14 PM  Performed by: MONTY Good MD  Authorized by: Lana Goldberg MD     Nonstress Test:     Variability:  6-25 BPM    Decelerations:  None    Accelerations:  15 bpm    Baseline:  150    Uterine Irritability: Yes      Contractions:  Not present      Labs Reviewed   COMPREHENSIVE METABOLIC PANEL - Abnormal; Notable for the following components:       Result Value    Potassium 3.1 (*)     CO2 22 (*)     BUN, Bld 4 (*)     Albumin 3.2 (*)     ALT 7 (*)     All other components within normal limits   CBC W/ AUTO DIFFERENTIAL - Abnormal; Notable for the following components:    Hemoglobin 11.7  (*)     Hematocrit 34.0 (*)     Gran% 75.3 (*)     Lymph% 17.6 (*)     All other components within normal limits   PROTEIN / CREATININE RATIO, URINE - Abnormal; Notable for the following components:    Protein, Urine Random 31 (*)     All other components within normal limits   TROPONIN I   POCT URINALYSIS, DIPSTICK OR TABLET REAGENT, AUTOMATED, WITH MICROSCOP          Imaging Results    None          Medical Decision Making:   History:   Old Medical Records: I decided to obtain old medical records.  Old Records Summarized: records from clinic visits.  Initial Assessment:   Chest pain. Contractions, vaginal discharge  ED Management:  EKG WNL  Pre-E labs WNL  Troponin negative    Cervix noted to be posterior, 4cm  1st dose betamethasone given    Severe range BP in 160s, procardia 10mg given x1                   ED Course as of May 02 0044   Wed May 01, 2019   2254 EKG - NSR; 68 BPM    [AV]   2255 Cervical exam: 4/50/-3 posterior per Resident    [AV]   2324 Pt c/o headache 10/10 will give fioricet  No IV accesss; will give nifedipine 10 mg PO x 1 now for severe range BPs    [AV]   2324 Anesthesia at bedside to get IV    [AV]      ED Course User Index  [AV] Lana Goldberg MD     Clinical Impression:       ICD-10-CM ICD-9-CM   1. Chronic hypertension with exacerbation during pregnancy in third trimester O10.913 642.03   2. Chest pain R07.9 786.50   3. 34 weeks gestation of pregnancy Z3A.34 V22.2   4. Obesity affecting pregnancy in third trimester O99.213 649.13   5. Chronic hypertension affecting pregnancy O10.919 642.00   6. Limited prenatal care in third trimester O09.33 V23.7   7. Premature cervical dilation in third trimester O34.33 654.53   8. Hypokalemia due to loss of potassium E87.6 276.8         Disposition:   Disposition: Admitted  Condition: Stable  - Admit to ante for observation  - Consents for delivery including vaginal or  section and blood transfusion signed and to chart  - Risks,  benefits, alternatives and possible complications have been discussed in detail with the patient.   - Pre-E labs WNL, repeat Pre-E labs tomorrow  - 2nd dose of BMZ tomorrow,  - PO potassium given, recheck in AM with CMP  - Likely chronic hypertension exacerbation, will check blood pressures overnight and assess for signs of labor, no active signs of  labor at this time only premature cervical dilation  - Continue to monitor for signs or symptoms of pre-eclampsia      Post-Partum Hemorrhage risk - low                          MONTY Good MD  Resident  19 0034       MONTY Good MD  Resident  19 0042  The patient has been seen and examined by me; NST reviewed; EKG reviewed & reassuring. Chest pain appears to be noncardiac, and I agree with the Resident assessment and plan as above.      Lana Goldberg MD  19 0047

## 2019-05-02 NOTE — PROGRESS NOTES
Patient reports recurrent headache, states it is severe and located in the front of her head. She last received Fioricet 4.5 hours ago. Discussed plan with MFM on-call - recently labile blood pressures requiring one dosage of carvedilol, although this initially appeared to be a chronic hypertension exacerbation the continued headache is suggestive of severe pre-eclampsia in addition she has borderline protenuria (p\c ratio 0.15).    Will begin induction with diagnosis 2/2 pre-eclampsia with severe features.  Pitocin for induction, will begin magnesium for seizure prophylaxis.      Sheldon Good MD   PGY-4 Ob-gyn

## 2019-05-02 NOTE — H&P
"Encounter Date: 2019        History           Chief Complaint   Patient presents with    Chest Pain       complains of chest tightness    Shortness of Breath      Macie Guzman is a 28 y.o. [id number] at 34w4d presents complaining of chest pain, headache. Reports pain is located in her sternum and radiates to her right arm. Started at 2100 this evening. Pain is a "sharp pain". Headache is not associated with visual changes. She also complains that she feels like she can't catch her breath.  She is also complaining of clear vaginal discharge that has been present since right before she took a bath a few hours ago, denies "large gush" of fluid. She also feels she has been having contractions since 2100.     She was seen in clinic with a sensation of irregular heartbeat and TSH was drawn which was normal, an EKG was ordered but the patient didn't follow up to obtain it.        This IUP is complicated by hypertension, and a prior history of pre-eclampsia in her last pregnancy with delivery at 36 weeks.  Patient denies vaginal bleeding, and LOF.   Fetal Movement: normal.              Review of patient's allergies indicates:  No Known Allergies       Past Medical History:   Diagnosis Date    Hypertension        No past surgical history on file.        Family History   Problem Relation Age of Onset    Colon cancer Neg Hx      Eclampsia Neg Hx      Breast cancer Neg Hx      Ovarian cancer Neg Hx      Cancer Neg Hx        Social History           Tobacco Use    Smoking status: Never Smoker    Smokeless tobacco: Never Used   Substance Use Topics    Alcohol use: No    Drug use: No      Review of Systems   Constitutional: Negative for chills, fatigue and fever.   HENT: Negative for congestion.    Eyes: Negative for visual disturbance.   Respiratory: Positive for shortness of breath. Negative for cough.    Cardiovascular: Negative for chest pain and palpitations.   Gastrointestinal: Negative for abdominal " distention, abdominal pain, constipation, diarrhea, nausea and vomiting.   Genitourinary: Positive for vaginal discharge. Negative for difficulty urinating, dysuria, hematuria and vaginal bleeding.   Skin: Negative for rash.   Neurological: Negative for dizziness, seizures, light-headedness and headaches.   Hematological: Does not bruise/bleed easily.   Psychiatric/Behavioral: Negative for dysphoric mood. The patient is not nervous/anxious.          Physical Exam             Initial Vitals   BP Pulse Resp Temp SpO2   -- -- -- -- --       MAP           --              Physical Exam     Constitutional: She appears well-developed and well-nourished. No distress.   HENT:   Head: Normocephalic and atraumatic.   Cardiovascular: Normal rate, regular rhythm and intact distal pulses.   No murmur heard.  Pulmonary/Chest: No respiratory distress.   Neurological: She is alert and oriented to person, place, and time.   Psychiatric: She has a normal mood and affect.      OB LABOR EXAM:   Pre-Term Labor: No.   Membranes ruptured: No.   Method: Sterile vaginal exam per MD and Sterile speculum exam per MD.   Vaginal Bleeding: none present.   Engagement: ballotable/floating.   Dilatation: 4.   Station: -4.   Effacement: 40%.         Comments: Cervix is multiparous and soft         ED Course   Obtain Fetal nonstress test (NST)  Date/Time: 5/1/2019 11:14 PM  Performed by: MONTY Good MD  Authorized by: Lana Goldberg MD      Nonstress Test:     Variability:  6-25 BPM    Decelerations:  None    Accelerations:  15 bpm    Baseline:  150    Uterine Irritability: Yes      Contractions:  Not present        Labs Reviewed   COMPREHENSIVE METABOLIC PANEL   CBC W/ AUTO DIFFERENTIAL   PROTEIN / CREATININE RATIO, URINE   TROPONIN I   POCT URINALYSIS, DIPSTICK OR TABLET REAGENT, AUTOMATED, WITH MICROSCOP         Imaging Results    None            Medical Decision Making:   History:   Old Medical Records: I decided to obtain old medical  records.  Old Records Summarized: records from clinic visits.  Initial Assessment:   Chest pain. Contractions, vaginal discharge  ED Management:  EKG WNL  Pre-E labs WNL  Troponin negative     Cervix noted to be posterior, 4cm  1st dose betamethasone given     Severe range BP in 160s, procardia 10mg given x1  Fioricet given with resolution of headache   MVP: 4.7               ED Course as of May 01 2335   Wed May 01, 2019   2254 EKG - NSR; 68 BPM    [AV]    Cervical exam: 4/50/-3 posterior per Resident    [AV]   2324 Pt c/o headache 10/10 will give fioricet  No IV accesss; will give nifedipine 10 mg PO x 1 now for severe range BPs    [AV]   2324 Anesthesia at bedside to get IV    [AV]       ED Course User Index  [AV] Lana Goldberg MD      Assessment:          ICD-10-CM ICD-9-CM   1. Chronic hypertension with exacerbation during pregnancy in third trimester O10.913 642.03   2. Chest pain R07.9 786.50   3. 34 weeks gestation of pregnancy Z3A.34 V22.2   4. Obesity affecting pregnancy in third trimester O99.213 649.13   5. Chronic hypertension affecting pregnancy O10.919 642.00   6. Limited prenatal care in third trimester O09.33 V23.7   7. Premature cervical dilation in third trimester O34.33 654.53   8. Hypokalemia due to loss of potassium E87.6 276.8          Plan:  Condition: Stable  - Admit to ante for observation  - Consents for delivery including vaginal or  section and blood transfusion signed and to chart  - Risks, benefits, alternatives and possible complications have been discussed in detail with the patient.   - Pre-E labs WNL, repeat Pre-E labs tomorrow  - 2nd dose of BMZ tomorrow,  - PO potassium given, recheck in AM with CMP  - Likely chronic hypertension exacerbation, will check blood pressures overnight and assess for signs of labor, no active signs of  labor at this time only premature cervical dilation  - Continue to monitor for signs or symptoms of  pre-eclampsia        Post-Partum Hemorrhage risk - low

## 2019-05-02 NOTE — NURSING
Mother taught how to Pumping for the Baby in NICU    Preparation and Hygiene:  Shower daily.  Wear a clean bra each day and wash daily in warm soapy water.  1. Change wet or moist breast pads frequently.  Moist pads can promote growth of germs.  2. Actively wash your hands, paying close attention to the area around and under your fingernails, thoroughly with soap and water for 15 seconds before pumping or handling your milk.  Re-wash your hands if you touch anything (scratching your nose, answering the phone, etc) while pumping or handling your milk.   3. Before pumping your breasts, assemble the pump collection kit and have ready the sterile container and labels.  Place these items on a clean surface next to the breast pump.  4. Each time after you have finished pumping, take apart all of the parts of the breast pump collection kit and place them in a separate cleaning container (do not place them in the sink).  Be sure to remove the yellow valve from the breast shield and separate the white membrane from the yellow valve.  Rinse all of these parts with cool water.  Then use a new sponge and/or bottle brush and dishwashing detergent to clean the parts.  Rinse off the soapy water with cool water and air dry on a clean towel covered with a clean cloth.  All parts may also be washed after each use in the top rack of a .  5. Once each day, sanitize all of the parts of the breast pump collection kit.  This can be done by boiling the kit parts for 10 minutes or by using a Quick Clean Micro-Steam Bag made by [employer]  6. If condensation appears in the tubing, continue to run the pump with the tubing attached for 1-2 minutes or until the tubing is dry.   7. Notify your babys nurse or doctor if you become ill or need to take any medication, even over-the-counter medicines.  Collection and Storage of Expressed Breast milk:       1. Pump your breasts at least 8-10 times every 24 hours.  Double pump (both  breasts at the same time) for at least 15-20 minutes using the most suction that is comfortable.    2. Write the date and time of pumping and the name of any medications you are taking on the babys pre-printed hospital identification label.   3. Place your babys pre-printed hospital identification label on each container of breast milk.  Additional pre-printed labels can be obtained from your babys nurse.  If your expressed breast milk is not correctly labeled, the nurse cannot feed the milk to the baby.       4.    Do not touch the inside of the storage containers or lids.  5.        Pour the amount of expressed milk needed for 1 of your babys feedings into each storage container.  Use a new container(s) for each pumping.  Additional storage containers can be obtained from your babys nurse.  6. Tightly screw the lid onto the container and place immediately into the refrigerator for daily transportation to the hospital.   Do not freeze your milk unless asked to do so by your babys nurse.  However, if you are not able to visit your baby each day, place the expressed breast milk in the freezer.  7.     Expressed breast milk should be refrigerated or frozen within 4 hours of pumping.  8.         Do not store expressed breast milk on the door of your refrigerator or freezer where the temperature is warmer.   Transportation of Expressed Breast milk:  1. Refrigerated breast milk or frozen milk should be packed tightly together in a cooler with frozen, gel-packs to keep the milk frozen.  DO NOT USE ICE CUBES (WET ICE) TO TRANSPORT FROZEN MILK.   A clean towel can be used to fill any extra space between containers of frozen milk.  2.    Bring your expressed milk from home each time you visit the baby.

## 2019-05-02 NOTE — PROGRESS NOTES
"MAG NOTE/Labor note      Macie Guzman is a 28 y.o. female with CHTN with SI preE.     Patient denies headaches, denies blurry vision and denies right upper quadrant pain. denies CP, denies SOB. Patient reports no nausea/no vomiting.     Objective:       BP (!) 98/54   Pulse 74   Temp 98 °F (36.7 °C)   Resp 16   Ht 5' 2" (1.575 m)   Wt 101.2 kg (223 lb)   SpO2 (!) 92%   Breastfeeding? No   BMI 40.79 kg/m²   Vitals:    05/02/19 1310 05/02/19 1340 05/02/19 1410 05/02/19 1440   BP: (!) 147/78 134/71 (!) 117/55 (!) 98/54   Pulse: 85 93 93 74   Resp:       Temp:       TempSrc:       SpO2: 100% 100% (!) 92%    Weight:       Height:             I/O last 3 completed shifts:  In: 263.1 [I.V.:163.1; IV Piggyback:100]  Out: 300 [Urine:300]    No intake/output data recorded.          General:   alert, appears stated age and cooperative   Lungs:   clear to auscultation bilaterally   Heart:   regular rate and rhythm, S1, S2 normal, no murmur, click, rub or gallop   Abdomen:  soft, non-tender; bowel sounds normal; no masses,  no organomegaly       Extremities: peripheral pulses normal, no pedal edema, no clubbing or cyanosis   Reflexes - 2+  bilaterally     SVE: 10/100/0, IUPC placed  FHTs: 130, Cat 2, reassuring    Lab Review  Recent Results (from the past 48 hour(s))   POCT urinalysis, dipstick or tablet reag    Collection Time: 05/01/19 10:34 PM   Result Value Ref Range    Color, UA yellow     Spec Grav UA 1.010     pH, UA 7     WBC, UA neg     Nitrite, UA neg     Protein trace     Glucose, UA normal     Ketones, UA neg     Urobilinogen, UA 4     Bilirubin neg     Blood, UA neg    Protein / creatinine ratio, urine    Collection Time: 05/01/19 10:54 PM   Result Value Ref Range    Protein, Urine Random 31 (H) 0 - 15 mg/dL    Creatinine, Random Ur 202.1 15.0 - 325.0 mg/dL    Prot/Creat Ratio, Ur 0.15 0.00 - 0.20   Comprehensive metabolic panel    Collection Time: 05/01/19 10:55 PM   Result Value Ref Range    Sodium 140 136 " - 145 mmol/L    Potassium 3.1 (L) 3.5 - 5.1 mmol/L    Chloride 107 95 - 110 mmol/L    CO2 22 (L) 23 - 29 mmol/L    Glucose 72 70 - 110 mg/dL    BUN, Bld 4 (L) 6 - 20 mg/dL    Creatinine 0.6 0.5 - 1.4 mg/dL    Calcium 9.2 8.7 - 10.5 mg/dL    Total Protein 7.1 6.0 - 8.4 g/dL    Albumin 3.2 (L) 3.5 - 5.2 g/dL    Total Bilirubin 0.5 0.1 - 1.0 mg/dL    Alkaline Phosphatase 113 55 - 135 U/L    AST 12 10 - 40 U/L    ALT 7 (L) 10 - 44 U/L    Anion Gap 11 8 - 16 mmol/L    eGFR if African American >60 >60 mL/min/1.73 m^2    eGFR if non African American >60 >60 mL/min/1.73 m^2   CBC auto differential    Collection Time: 05/01/19 10:55 PM   Result Value Ref Range    WBC 10.20 3.90 - 12.70 K/uL    RBC 4.01 4.00 - 5.40 M/uL    Hemoglobin 11.7 (L) 12.0 - 16.0 g/dL    Hematocrit 34.0 (L) 37.0 - 48.5 %    Mean Corpuscular Volume 85 82 - 98 fL    Mean Corpuscular Hemoglobin 29.2 27.0 - 31.0 pg    Mean Corpuscular Hemoglobin Conc 34.4 32.0 - 36.0 g/dL    RDW 12.8 11.5 - 14.5 %    Platelets 260 150 - 350 K/uL    MPV 10.5 9.2 - 12.9 fL    Gran # (ANC) 7.7 1.8 - 7.7 K/uL    Lymph # 1.8 1.0 - 4.8 K/uL    Mono # 0.6 0.3 - 1.0 K/uL    Eos # 0.0 0.0 - 0.5 K/uL    Baso # 0.01 0.00 - 0.20 K/uL    Gran% 75.3 (H) 38.0 - 73.0 %    Lymph% 17.6 (L) 18.0 - 48.0 %    Mono% 6.2 4.0 - 15.0 %    Eosinophil% 0.3 0.0 - 8.0 %    Basophil% 0.1 0.0 - 1.9 %    Differential Method Automated    Troponin I    Collection Time: 05/01/19 10:55 PM   Result Value Ref Range    Troponin I <0.006 0.000 - 0.026 ng/mL   Type & Screen    Collection Time: 05/02/19  6:52 AM   Result Value Ref Range    Group & Rh O POS     Indirect Martha NEG    CBC auto differential    Collection Time: 05/02/19  2:10 PM   Result Value Ref Range    WBC 13.01 (H) 3.90 - 12.70 K/uL    RBC 4.27 4.00 - 5.40 M/uL    Hemoglobin 12.3 12.0 - 16.0 g/dL    Hematocrit 36.4 (L) 37.0 - 48.5 %    Mean Corpuscular Volume 85 82 - 98 fL    Mean Corpuscular Hemoglobin 28.8 27.0 - 31.0 pg    Mean Corpuscular  Hemoglobin Conc 33.8 32.0 - 36.0 g/dL    RDW 13.0 11.5 - 14.5 %    Platelets 278 150 - 350 K/uL    MPV 10.3 9.2 - 12.9 fL    Gran # (ANC) 11.1 (H) 1.8 - 7.7 K/uL    Lymph # 1.5 1.0 - 4.8 K/uL    Mono # 0.4 0.3 - 1.0 K/uL    Eos # 0.0 0.0 - 0.5 K/uL    Baso # 0.01 0.00 - 0.20 K/uL    Gran% 84.9 (H) 38.0 - 73.0 %    Lymph% 11.1 (L) 18.0 - 48.0 %    Mono% 3.2 (L) 4.0 - 15.0 %    Eosinophil% 0.1 0.0 - 8.0 %    Basophil% 0.1 0.0 - 1.9 %    Differential Method Automated           Assessment:     28 y.o.  female , on magnesium sulfate    Active Hospital Problems    Diagnosis  POA    *Severe pre-eclampsia in third trimester [O14.13]  Clinically Undetermined    Limited prenatal care in third trimester [O09.33]  Not Applicable    Premature cervical dilation in third trimester [O34.33]  Yes    Hypokalemia due to loss of potassium [E87.6]  Yes    34 weeks gestation of pregnancy [Z3A.34]  Not Applicable    Chronic hypertension with exacerbation during pregnancy in third trimester [O10.913]  Yes      Resolved Hospital Problems   No resolved problems to display.        Plan:   1. Continue magnesium sulfate, no signs of toxicity at this time  2. Monitor for s/s of eclampsia  3. Close maternal monitoring including UOP and BP,   - UOP adequate  4. Move to OR for delivery

## 2019-05-02 NOTE — L&D DELIVERY NOTE
[facility]  Vaginal Delivery   Operative Note    SUMMARY     Normal spontaneous vaginal delivery of live infant, skin to skin was unable to be performed due to  fetus requiring initial assessment by NICU staff.  Infant delivered position OA over intact perineum.  Nuchal cord: No.    Spontaneous delivery of placenta and IV pitocin given noting good uterine tone.  No lacerations noted.  Patient tolerated delivery well. Sponge needle and lap counted correctly x2.    Indications: Severe pre-eclampsia in third trimester  Pregnancy complicated by:   Patient Active Problem List   Diagnosis    Morbid obesity with BMI of 40.0-44.9, adult    Chronic hypertension affecting pregnancy    Supervision of high risk pregnancy in third trimester/btl papers     Obesity affecting pregnancy in third trimester    Hypokalemia due to loss of potassium    Chronic hypertension with exacerbation during pregnancy in third trimester    Severe pre-eclampsia in third trimester     (spontaneous vaginal delivery)     Admitting GA: 34w5d    Delivery Information for  Batsheva Guzman    Birth information:  YOB: 2018   Time of birth: 3:20 PM   Sex: female   Head Delivery Date/Time: 2019  3:20 PM   Delivery type: Vaginal, Spontaneous   Gestational Age: 34w5d    Delivery Providers    Delivering clinician:  Dawn Alvares MD   Provider Role    MD Ruby Leon MD Heather Flores, RN     Jade Martinez, RN             Measurements    Weight:  1930 g  Length:           Apgars    Living status:  Living  Apgars:   1 min.:   5 min.:   10 min.:   15 min.:   20 min.:     Skin color:   0  1       Heart rate:   2  2       Reflex irritability:   2  2       Muscle tone:   2  2       Respiratory effort:   2  2       Total:   8  9       Apgars assigned by:  NICU         Operative Delivery    Forceps attempted?:  No  Vacuum extractor attempted?:  No         Shoulder  Dystocia    Shoulder dystocia present?:  No           Presentation    Presentation:  Vertex  Position:  Left Occiput Anterior           Interventions/Resuscitation    Method:  NICU Attended       Cord    Vessels:  3 vessels  Complications:  None  Delayed Cord Clamping?:  No  Cord Clamped Date/Time:  2019  3:20 PM  Cord Blood Disposition:  Discarded  Gases Sent?:  No  Stem Cell Collection (by MD):  No       Placenta    Placenta delivery date/time:  2019 1523  Placenta removal:  Spontaneous  Placenta appearance:  Intact  Placenta disposition:  discarded           Labor Events:       labor:       Labor Onset Date/Time:         Dilation Complete Date/Time:         Start Pushing Date/Time:       Rupture Date/Time: 19  0945         Rupture type:           Fluid Amount:        Fluid Color:        Fluid Odor:        Membrane Status (PeriCalm): ARM (Artificial Rupture)      Rupture Date/Time (PeriCalm):        Fluid Amount (PeriCalm): Small      Fluid Color (PeriCalm): Clear       steroids:       Antibiotics given for GBS:       Induction:       Indications for induction:        Augmentation:       Indications for augmentation:       Labor complications:       Additional complications:          Cervical ripening:                     Delivery:      Episiotomy: None     Indication for Episiotomy:       Perineal Lacerations: None Repaired:      Periurethral Laceration:   Repaired:     Labial Laceration:   Repaired:     Sulcus Laceration:   Repaired:     Vaginal Laceration:   Repaired:     Cervical Laceration:   Repaired:     Repair suture: None     Repair # of packets:       Last Value - EBL - Nursing (mL): 250     Sum - EBL - Nursing (mL): 250     Last Value - EBL - Anesthesia (mL):      Calculated QBL (mL):        Vaginal Sweep Performed: Yes     Surgicount Correct: Yes       Other providers:       Anesthesia    Method:  Epidural          Details (if applicable):  Trial of Labor       Categorization:      Priority:     Indications for :     Incision Type:       Additional  information:  Forceps:    Vacuum:    Breech:    Observed anomalies    Other (Comments):

## 2019-05-02 NOTE — ANESTHESIA PREPROCEDURE EVALUATION
"Macie Guzman is a 28 y.o. female [id number] at 34w5d who presented with headache and HTN. She is also complaining of clear vaginal discharge that has been present since right before she took a bath a few hours ago, denies "large gush" of fluid. She also feels she has been having contractions since .      This IUP is complicated by hypertension, and a prior history of pre-eclampsia in her last pregnancy with delivery at 36 weeks.  Patient denies vaginal bleeding, and LOF.   Fetal Movement: normal.        OB History    Para Term  AB Living   4 3 2 1   3   SAB TAB Ectopic Multiple Live Births         0 3      # Outcome Date GA Lbr Erick/2nd Weight Sex Delivery Anes PTL Lv   4 Current            3  17 36w0d   M Vag-Spont EPI  TANNER      Birth Comments: induced for pre - eclampsia      Complications: Severe pre-eclampsia   2 Term 14 39w0d  2.892 kg (6 lb 6 oz) F Vag-Spont EPI N TANNER      Complications: Gestational (pregnancy-induced) hypertension without significant proteinuria, first trimester   1 Term 11 37w0d  3.345 kg (7 lb 6 oz) F Vag-Spont  N TANNER      Obstetric Comments   Pt reports having 2 living children. Obstetric history initially had 4 NSVDs- updated on 3/8/2017 per patient report.        Wt Readings from Last 1 Encounters:   19 0156 101.2 kg (223 lb)       BP Readings from Last 3 Encounters:   19 134/83   19 110/60   19 130/82       Patient Active Problem List   Diagnosis    Morbid obesity with BMI of 40.0-44.9, adult    Chronic hypertension affecting pregnancy    Supervision of high risk pregnancy in third trimester/btl papers     Obesity affecting pregnancy in third trimester    Limited prenatal care in third trimester    Premature cervical dilation in third trimester    Hypokalemia due to loss of potassium    34 weeks gestation of pregnancy    Chronic hypertension with exacerbation during pregnancy in third trimester    Severe " pre-eclampsia in third trimester       History reviewed. No pertinent surgical history.    Social History     Socioeconomic History    Marital status: Single     Spouse name: Not on file    Number of children: Not on file    Years of education: Not on file    Highest education level: Not on file   Occupational History    Not on file   Social Needs    Financial resource strain: Not on file    Food insecurity:     Worry: Not on file     Inability: Not on file    Transportation needs:     Medical: Not on file     Non-medical: Not on file   Tobacco Use    Smoking status: Never Smoker    Smokeless tobacco: Never Used   Substance and Sexual Activity    Alcohol use: No    Drug use: No    Sexual activity: Yes     Partners: Male     Birth control/protection: None   Lifestyle    Physical activity:     Days per week: Not on file     Minutes per session: Not on file    Stress: Not on file   Relationships    Social connections:     Talks on phone: Not on file     Gets together: Not on file     Attends L[employer]-day service: Not on file     Active member of club or organization: Not on file     Attends meetings of clubs or organizations: Not on file     Relationship status: Not on file   Other Topics Concern    Not on file   Social History Narrative    Not on file         Chemistry        Component Value Date/Time     05/01/2019 2255    K 3.1 (L) 05/01/2019 2255     05/01/2019 2255    CO2 22 (L) 05/01/2019 2255    BUN 4 (L) 05/01/2019 2255    CREATININE 0.6 05/01/2019 2255    GLU 72 05/01/2019 2255        Component Value Date/Time    CALCIUM 9.2 05/01/2019 2255    ALKPHOS 113 05/01/2019 2255    AST 12 05/01/2019 2255    ALT 7 (L) 05/01/2019 2255    BILITOT 0.5 05/01/2019 2255    ESTGFRAFRICA >60 05/01/2019 2255    EGFRNONAA >60 05/01/2019 2255            Lab Results   Component Value Date    WBC 10.20 05/01/2019    HGB 11.7 (L) 05/01/2019    HCT 34.0 (L) 05/01/2019    MCV 85 05/01/2019      05/01/2019       No results for input(s): PT, INR, PROTIME, APTT in the last 72 hours.                  Anesthesia Evaluation    I have reviewed the Patient Summary Reports.    I have reviewed the Nursing Notes.   I have reviewed the Medications.     Review of Systems  Anesthesia Hx:  No problems with previous Anesthesia    Social:  Non-Smoker, No Alcohol Use    Hematology/Oncology:  Hematology Normal   Oncology Normal     EENT/Dental:EENT/Dental Normal   Cardiovascular:   Hypertension    Pulmonary:   Denies Asthma.    Musculoskeletal:  Denies Spine Disorders    Neurological:   Denies Seizures.    Endocrine:   Diabetes, gestational    Psych:  Psychiatric Normal           Physical Exam  General:  Well nourished    Airway/Jaw/Neck:  Airway Findings: Mouth Opening: Normal Tongue: Normal  General Airway Assessment: Adult, Good  Mallampati: III  Improves to II with phonation.  TM Distance: Normal, at least 6 cm  Jaw/Neck Findings:  Neck ROM: Normal ROM      Dental:  Dental Findings: In tact   Chest/Lungs:  Chest/Lungs Clear    Heart/Vascular:  Heart Findings: Normal Heart murmur: negative       Mental Status:  Mental Status Findings:  Cooperative, Alert and Oriented         Anesthesia Plan  Type of Anesthesia, risks & benefits discussed:  Anesthesia Type:  CSE, epidural, general, MAC, spinal  Patient's Preference:   Intra-op Monitoring Plan: standard ASA monitors  Intra-op Monitoring Plan Comments:   Post Op Pain Control Plan: per primary service following discharge from PACU, intrathecal opioid, epidural analgesia, IV/PO Opioids PRN and multimodal analgesia  Post Op Pain Control Plan Comments:   Induction:   IV  Beta Blocker:         Informed Consent: Patient understands risks and agrees with Anesthesia plan.  Questions answered. Anesthesia consent signed with patient.  ASA Score: 2     Day of Surgery Review of History & Physical:            Ready For Surgery From Anesthesia Perspective.

## 2019-05-02 NOTE — PROGRESS NOTES
"MAG NOTE/Labor note      Macie Guzman is a 28 y.o. female with CHTN with SI preE.     Patient denies headaches, denies blurry vision and denies right upper quadrant pain. denies CP, denies SOB. Patient reports no nausea/no vomiting.     Objective:       /78   Pulse 108   Temp 98 °F (36.7 °C)   Resp 16   Ht 5' 2" (1.575 m)   Wt 101.2 kg (223 lb)   SpO2 100%   Breastfeeding? No   BMI 40.79 kg/m²   Vitals:    05/02/19 1035 05/02/19 1110 05/02/19 1140 05/02/19 1210   BP: (!) 143/75 (!) 141/82 (!) 141/72 139/78   Pulse: 82 86 78 108   Resp:       Temp:       TempSrc:       SpO2: 99% 99% 97% 100%   Weight:       Height:             I/O last 3 completed shifts:  In: 263.1 [I.V.:163.1; IV Piggyback:100]  Out: 300 [Urine:300]    No intake/output data recorded.          General:   alert, appears stated age and cooperative   Lungs:   clear to auscultation bilaterally   Heart:   regular rate and rhythm, S1, S2 normal, no murmur, click, rub or gallop   Abdomen:  soft, non-tender; bowel sounds normal; no masses,  no organomegaly       Extremities: peripheral pulses normal, no pedal edema, no clubbing or cyanosis   Reflexes - 2+  bilaterally     SVE: 6/80/-2, IUPC placed  FHTs: 130, Cat 2, reassuring    Lab Review  Recent Results (from the past 48 hour(s))   Hemoglobin A1c    Collection Time: 04/30/19  1:59 PM   Result Value Ref Range    Hemoglobin A1C 5.2 4.0 - 5.6 %    Estimated Avg Glucose 103 68 - 131 mg/dL   TSH    Collection Time: 04/30/19  1:59 PM   Result Value Ref Range    TSH 1.175 0.400 - 4.000 uIU/mL   POCT urinalysis, dipstick or tablet reag    Collection Time: 05/01/19 10:34 PM   Result Value Ref Range    Color, UA yellow     Spec Grav UA 1.010     pH, UA 7     WBC, UA neg     Nitrite, UA neg     Protein trace     Glucose, UA normal     Ketones, UA neg     Urobilinogen, UA 4     Bilirubin neg     Blood, UA neg    Protein / creatinine ratio, urine    Collection Time: 05/01/19 10:54 PM   Result Value Ref " Range    Protein, Urine Random 31 (H) 0 - 15 mg/dL    Creatinine, Random Ur 202.1 15.0 - 325.0 mg/dL    Prot/Creat Ratio, Ur 0.15 0.00 - 0.20   Comprehensive metabolic panel    Collection Time: 19 10:55 PM   Result Value Ref Range    Sodium 140 136 - 145 mmol/L    Potassium 3.1 (L) 3.5 - 5.1 mmol/L    Chloride 107 95 - 110 mmol/L    CO2 22 (L) 23 - 29 mmol/L    Glucose 72 70 - 110 mg/dL    BUN, Bld 4 (L) 6 - 20 mg/dL    Creatinine 0.6 0.5 - 1.4 mg/dL    Calcium 9.2 8.7 - 10.5 mg/dL    Total Protein 7.1 6.0 - 8.4 g/dL    Albumin 3.2 (L) 3.5 - 5.2 g/dL    Total Bilirubin 0.5 0.1 - 1.0 mg/dL    Alkaline Phosphatase 113 55 - 135 U/L    AST 12 10 - 40 U/L    ALT 7 (L) 10 - 44 U/L    Anion Gap 11 8 - 16 mmol/L    eGFR if African American >60 >60 mL/min/1.73 m^2    eGFR if non African American >60 >60 mL/min/1.73 m^2   CBC auto differential    Collection Time: 19 10:55 PM   Result Value Ref Range    WBC 10.20 3.90 - 12.70 K/uL    RBC 4.01 4.00 - 5.40 M/uL    Hemoglobin 11.7 (L) 12.0 - 16.0 g/dL    Hematocrit 34.0 (L) 37.0 - 48.5 %    Mean Corpuscular Volume 85 82 - 98 fL    Mean Corpuscular Hemoglobin 29.2 27.0 - 31.0 pg    Mean Corpuscular Hemoglobin Conc 34.4 32.0 - 36.0 g/dL    RDW 12.8 11.5 - 14.5 %    Platelets 260 150 - 350 K/uL    MPV 10.5 9.2 - 12.9 fL    Gran # (ANC) 7.7 1.8 - 7.7 K/uL    Lymph # 1.8 1.0 - 4.8 K/uL    Mono # 0.6 0.3 - 1.0 K/uL    Eos # 0.0 0.0 - 0.5 K/uL    Baso # 0.01 0.00 - 0.20 K/uL    Gran% 75.3 (H) 38.0 - 73.0 %    Lymph% 17.6 (L) 18.0 - 48.0 %    Mono% 6.2 4.0 - 15.0 %    Eosinophil% 0.3 0.0 - 8.0 %    Basophil% 0.1 0.0 - 1.9 %    Differential Method Automated    Troponin I    Collection Time: 19 10:55 PM   Result Value Ref Range    Troponin I <0.006 0.000 - 0.026 ng/mL   Type & Screen    Collection Time: 19  6:52 AM   Result Value Ref Range    Group & Rh O POS     Indirect Martha NEG           Assessment:     28 y.o.  female , on magnesium sulfate    Active  Hospital Problems    Diagnosis  POA    *Severe pre-eclampsia in third trimester [O14.13]  Clinically Undetermined    Limited prenatal care in third trimester [O09.33]  Not Applicable    Premature cervical dilation in third trimester [O34.33]  Yes    Hypokalemia due to loss of potassium [E87.6]  Yes    34 weeks gestation of pregnancy [Z3A.34]  Not Applicable    Chronic hypertension with exacerbation during pregnancy in third trimester [O10.913]  Yes      Resolved Hospital Problems   No resolved problems to display.        Plan:   1. Continue magnesium sulfate, no signs of toxicity at this time  2. Monitor for s/s of eclampsia  3. Close maternal monitoring including UOP and BP,   - BP: (133-168)/(72-94) 143/75  - UOP adequate, RN needs to reupdate   4. Recheck in 2-4 hours or PRN      Loretta VALDERRAMA  PGY2

## 2019-05-03 ENCOUNTER — TELEPHONE (OUTPATIENT)
Dept: LACTATION | Facility: CLINIC | Age: 29
End: 2019-05-03

## 2019-05-03 NOTE — SIGNIFICANT EVENT
"MD to bedside to discuss issue with childcare. Pt has 2 y/o and 5 y/o children at bedside. No other adult in room. Pt reports that her cousin was supposed to come back and spend the night with the children, but has not yet returned. Discussed hospital policy requiring second adult in room to care for children, otherwise CPS report will be filed. Pt voiced understanding and states that she wants to "leave the hospital." Counseled at length on necessity for magnesium for seizure prophylaxis for 24 hours following delivery (pt delivered at 1520 this afternoon). Counseled extensively on necessity for continuing to monitor blood pressures and risk for eclampsia in the post partum period. Patient voiced understanding and desires to leave AMA    AMA paperwork filled out covering all risks including seizure, stroke, death. Pt voiced understanding. 2 RNs at bedside during conversation. Instructed pt to return to the [facility] for HA, changes in vision, SOB, CP, RUQ pain.     Pt instructed to continue labetalol 100mg BID and necessity of close follow up in clinic next week for a blood pressure check.    AMA paperwork placed in chart to be scanned into media    Staff notified    Parisa Ta MD   OBGYN, PGY-1    "

## 2019-05-03 NOTE — DISCHARGE SUMMARY
Delivery Discharge Summary  Obstetrics      Primary OB Clinician: Brea Celestin MD    Admission date: 2019  Discharge date: 2019    Disposition: To home, self care    Discharge Diagnosis List:      Patient Active Problem List   Diagnosis    Morbid obesity with BMI of 40.0-44.9, adult    Chronic hypertension affecting pregnancy    Supervision of high risk pregnancy in third trimester/btl papers     Obesity affecting pregnancy in third trimester    Hypokalemia due to loss of potassium    Chronic hypertension with exacerbation during pregnancy in third trimester    Severe pre-eclampsia in third trimester     (spontaneous vaginal delivery)    Left against medical advice       Procedure:     Hospital Course:  Macie Guzman is a 28 y.o. now , PPD #0 who was admitted on 2019 at 34w4d for cHTN exacerbation vs preeclamsia and premature cervical dilation. Laboratory workup was remarkable for P:C ratio of 0.15. Patient was subsequently admitted to the antepartum unit with signed consents. She was given steroids and closely monitored. Patient then developed HA that was unrelieved with medication and was subsequently moved to labor and delivery for IOL 2/2 preE w/ SF (HA).    Labor course was uncomplicated and resulted in  without complications.     Please see delivery note for further details. Patient was transferred to mother baby, magnesium was continued for seizure prophylaxis. Pt with social issue regarding childcare, as there was no available adult to stay with her children at bedside (please see significant event note at 2311 for more detail). Pt was extensively counseled on risks of ending magnesium infusion prematurely, including seizure, stroke and death. Pt accepted risks and signed an AMA form.     Pt encouraged to return to [facility] with HA, changes in vision, SOB, CP, RUQ pain. Also instructed to follow up with [facility] in 1 week for blood pressure check.    Pertinent  studies:  CBC  Recent Labs   Lab 05/01/19  2255 05/02/19  1410   WBC 10.20 13.01*   HGB 11.7* 12.3   HCT 34.0* 36.4*   MCV 85 85    278          Immunization History   Administered Date(s) Administered    Tdap 07/30/2014, 06/09/2017        Delivery:    Episiotomy: None   Lacerations: None   Repair suture: None   Repair # of packets:     Blood loss (ml): 250     Birth information:  YOB: 2018   Time of birth: 3:20 PM   Sex: female   Delivery type: Vaginal, Spontaneous   Gestational Age: 34w5d    Delivery Clinician:      Other providers:       Additional  information:  Forceps:    Vacuum:    Breech:    Observed anomalies      Living?:           APGARS  One minute Five minutes Ten minutes   Skin color:         Heart rate:         Grimace:         Muscle tone:         Breathing:         Totals: 8  9        Placenta: Delivered:       appearance      Patient Instructions:   Discharge Medication List as of 5/2/2019 11:14 PM      START taking these medications    Details   ibuprofen (ADVIL,MOTRIN) 600 MG tablet Take 1 tablet (600 mg total) by mouth every 6 (six) hours as needed (cramping)., Starting Thu 5/2/2019, Normal         CONTINUE these medications which have NOT CHANGED    Details   aspirin 81 MG Chew Take 81 mg by mouth once daily., Historical Med      labetalol (NORMODYNE) 100 MG tablet Take 1 tablet (100 mg total) by mouth 2 (two) times daily., Starting Mon 4/8/2019, Until Tue 4/7/2020, Normal             Discharge Procedure Orders   Other restrictions (specify):   Order Comments: Nothing in vagina for 6 weeks (no intercourse, douching, tampons, etc.)     Notify your health care provider if you experience any of the following:   Order Comments: Soaking through 1 pad/hour for greater than 2 hours     Notify your health care provider if you experience any of the following:  increased confusion or weakness     Notify your health care provider if you experience any of the following:  persistent  dizziness, light-headedness, or visual disturbances     Notify your health care provider if you experience any of the following:  severe persistent headache     Notify your health care provider if you experience any of the following:  difficulty breathing or increased cough     Notify your health care provider if you experience any of the following:  severe uncontrolled pain     Notify your health care provider if you experience any of the following:  persistent nausea and vomiting or diarrhea     Notify your health care provider if you experience any of the following:  temperature >100.4     Activity as tolerated       Follow-up Information     [facility]. Schedule an appointment as soon as possible for a visit in 1 week.    Specialty:  Obstetrics and Gynecology  Why:  blood pressure check  Contact information:  [address] [place] [id number] [phone]           [facility]. Go in 6 weeks.    Specialty:  Obstetrics and Gynecology  Why:  Post Partum Visit  Contact information:  [address] [place] [id number] [phone]           [facility]. Go in 1 week.    Why:  Blood Pressure check  Contact information:  [address] [place] [address] [phone]                  Parisa Ta MD   OBGYN, PGY-1

## 2019-05-03 NOTE — NURSING
"2000 Pt has 1 and 5 y/o present at bedside. Pt states, "my aunt left to go take a bath." Reinforced the importance of an adult other than herself stay with the kids at all times. Informed her that children are not allowed to stay the night for safety purposes. Pt states, " I have no one to watch the kids, I will leave AMA if I have too." " I just don't want to get CPS involved." CN and house supp at bedside to inform her of the importance of another adult present at all times.Pt states," My aunt is coming back to stay the night."     2130 Checked on pt to see if aunt returned. Pt states, "she is still on her way." Jamison weir and CN updated.    2200. Pt still awaiting aunts arrival. Pt states, " if she doesn't come back, ill just leave."    2230 Dr. Ta notified and updated of pt's  issue. MD bedside to discuss the importance of another adult at bedside to care for children. Pt unable to get in touch with aunt now. Pt agreed to leave AMA. MD discussed risks of leaving against medical advice and to come back to ED if she starts feeling bad. Pt signed AMA form, double witness present.          "

## 2019-05-03 NOTE — PLAN OF CARE
05/03/19 1039   Discharge Assessment   Assessment Type Discharge Planning Assessment   Confirmed/corrected address and phone number on facesheet? Yes   Assessment information obtained from? Caregiver   Expected Length of Stay (days) 14   Communicated expected length of stay with patient/caregiver no   Current Functional Status: Infant/Toddler/Child Appropriate   Lives With parent(s);sibling(s)   Is patient able to care for self after discharge? No;Patient is of pediatric age   Discharge Plan A Home with family   Patient/Family in Agreement with Plan yes       Windy Retana LCSW-[facility]  NICU   Ext. [phone]-phone  [email or web address]

## 2019-05-05 ENCOUNTER — PATIENT MESSAGE (OUTPATIENT)
Dept: OBSTETRICS AND GYNECOLOGY | Facility: CLINIC | Age: 29
End: 2019-05-05

## 2019-05-06 LAB — BACTERIA SPEC AEROBE CULT: NORMAL

## 2019-05-07 ENCOUNTER — TELEPHONE (OUTPATIENT)
Dept: OBSTETRICS AND GYNECOLOGY | Facility: OTHER | Age: 29
End: 2019-05-07

## 2019-05-07 ENCOUNTER — TELEPHONE (OUTPATIENT)
Dept: OBSTETRICS AND GYNECOLOGY | Facility: CLINIC | Age: 29
End: 2019-05-07

## 2019-05-07 NOTE — ANESTHESIA POSTPROCEDURE EVALUATION
Anesthesia Post Evaluation    Patient: Macie Guzman    Procedure(s) Performed: * No procedures listed *    Final Anesthesia Type: epidural  Patient location during evaluation: labor & delivery  Patient participation: Yes- Able to Participate  Level of consciousness: awake and alert  Post-procedure vital signs: reviewed and stable  Pain management: adequate  Airway patency: patent  PONV status at discharge: No PONV  Anesthetic complications: no      Cardiovascular status: blood pressure returned to baseline  Respiratory status: unassisted, spontaneous ventilation and room air  Hydration status: euvolemic  Follow-up not needed.              No case tracking events are documented in the log.      Pain/Kayla Score: No data recorded

## 2019-05-07 NOTE — TELEPHONE ENCOUNTER
Patient states that her blood pressures have been well-controlled at home (110s/70s). She denies headache, change in vision, SOB, RUQ pain, and edema. Cannot come in for appointment today but can come in for appt tomorrow at 1430.     Melvina Haq MD  [facility], PGY-1

## 2019-05-09 ENCOUNTER — TELEPHONE (OUTPATIENT)
Dept: OBSTETRICS AND GYNECOLOGY | Facility: CLINIC | Age: 29
End: 2019-05-09

## 2019-05-09 NOTE — TELEPHONE ENCOUNTER
Called pt.  States bp fine.  Denies pih sx.  She does complains of back pain.  Please schedule appointment around 2 today at [facility]

## 2019-10-30 NOTE — ED NOTES
Chief complaint: Right buttock pain    History: Pleasant 46-year-old female returns for follow-up regarding her right buttock pain pain started flaring up again in August 2019 burning in nature worse with sitting walking changing positions no numbness no tingling no weakness no fevers no chills no loss of bowel or bladder control. Patient denies any sedation or confusion she has never been on Lyrica she would like topical Voltaren again for medication for treatment she is interested in going back to physical therapy again.     Pain is burning in nature    This note was dictated into dragon and copied into [facility]      Current Outpatient Medications   Medication Sig   • medroxyPROGESTERone (PROVERA) 10 MG tablet Take one tablet by mouth daily for 10 days per month.   • albuterol 108 (90 Base) MCG/ACT inhaler Inhale 2 puffs into the lungs every 4 hours as needed for Shortness of Breath.   • EPINEPHrine (EPIPEN 2-SONIA) 0.3 MG/0.3ML auto-injector Inject 0.3 mLs into the muscle as needed (anaphylaxis).   • Riboflavin (VITAMIN B-2) 25 MG Tab Take by mouth daily.   • Magnesium Hydroxide (MAGNESIA PO) Take 1 tablet by mouth daily.   • mesalamine (LIALDA) 1.2 g EC tablet Take 2 tablets by mouth daily (with breakfast).   • pregabalin (LYRICA) 50 MG capsule Take 1 capsule by mouth 2 times daily. Driving precautions   • diclofenac (VOLTAREN) 1 % gel Apply 2-4 g topically 4 times daily as needed for Pain.     No current facility-administered medications for this visit.        ALLERGIES:   Allergen Reactions   • Onion ANAPHYLAXIS     Red onion     • Pineapple ANAPHYLAXIS   • Aloe RASH   • Darvocet [Propoxyphene N-Apap] Palpitations   • Latex   (Environmental) RASH   • [place] Fruit HIVES   • Raspberry HIVES     Physical examination:  Vital signs:  Visit Vitals  /70   Temp 99.2 °F (37.3 °C)   Wt 97.3 kg   LMP 10/11/2019 (Approximate)   BMI 37.40 kg/m²       General: No apparent distress. Alert and oriented x3. Affect  Physician notified.     normal.  Respiratory: Effort nonlabored.  Cardiovascular: Plus one pitting edema noted in the lower extremities and feet.  Normal muscle bulk and tone in the bilateral lower extremities.  Dermatologic: No nodules, rashes or lesions noted in the lumbar spine.    Musculoskeletal evaluation:  Inspection: No atrophy or fasciculations are noted in the lower extremities.  Palpation: Nontender over the lumbar spinous processes.not tender over the left paraspinal muscles, not tender over the right paraspinal muscles, not tender Left sacroiliac joint tender right sacroiliac joint. Not tender left sciatic notch . tender right sciatic notch.   Range of motion: Left lumbar spine extension combined with axial rotation to the left side not painful, right lumbar spine extension combined with rotation to the right side not painful, lateral bending to the left side not painful, lateral bending to the right side not painful. Forward flexion of the lumbar spine recreates right buttock pain. Seated slump test on the left side negative, seated slump test on the right side positive. Straight leg raise on the left side negative. Straight leg raise on the right side positive.  Timothy's test on the left side negative, Timothy's test on the right side refers pain to right SI joint, gapping test is negative on the right. Hip internal and external rotation was pain-free on the left and right side.    Manual muscle testin out of 5 and bilaterally symmetrical in the lower extremities tested while sitting. Formal hamstring testing right side 4++/5 compared to 5 out of 5 on the left  Sensation: Intact to light and sharp touch in the bilateral lower extremities.  Reflexes: Plus 1 and bilaterally symmetrical at the patella, Achilles, and medial hamstrings.  Gait: Patient able to ambulate on their toes and heels without any evidence of dynamic weakness.    Impression:  1. Right S1 lumbar radiculopathy with concomitant right SI joint  dysfunction    Recommendations/plan:   Physical therapy lumbar spine stabilization program address SI joint 1-2 times a week for next 6 weeks  Topical Voltaren 1% 2 g to 4 g q.6 hours dispense 300 g with refill good Rx coupon provided     Lyrica 50 mg p.o. b.i.d. side effects precautions discussed PDMP reviewed dispense #60 driving precautions    Follow-up in one month in the office or sooner if needed.

## 2019-11-05 ENCOUNTER — PATIENT MESSAGE (OUTPATIENT)
Dept: OBSTETRICS AND GYNECOLOGY | Facility: CLINIC | Age: 29
End: 2019-11-05

## 2019-11-06 ENCOUNTER — PATIENT MESSAGE (OUTPATIENT)
Dept: OBSTETRICS AND GYNECOLOGY | Facility: CLINIC | Age: 29
End: 2019-11-06

## 2019-11-06 DIAGNOSIS — R30.0 DYSURIA: Primary | ICD-10-CM

## 2019-11-06 PROBLEM — O09.93 SUPERVISION OF HIGH RISK PREGNANCY IN THIRD TRIMESTER: Status: RESOLVED | Noted: 2019-04-30 | Resolved: 2019-11-06

## 2019-11-06 RX ORDER — NITROFURANTOIN 25; 75 MG/1; MG/1
100 CAPSULE ORAL 2 TIMES DAILY
Qty: 14 CAPSULE | Refills: 0 | Status: SHIPPED | OUTPATIENT
Start: 2019-11-06 | End: 2019-11-13

## 2019-12-27 ENCOUNTER — PATIENT MESSAGE (OUTPATIENT)
Dept: OBSTETRICS AND GYNECOLOGY | Facility: CLINIC | Age: 29
End: 2019-12-27

## 2019-12-27 NOTE — TELEPHONE ENCOUNTER
There is not a specific test to run for anxiety- if you are having all those symptoms I would recommend that you go to the ER for evaluation.

## 2020-02-11 PROBLEM — Z53.29 LEFT AGAINST MEDICAL ADVICE: Status: RESOLVED | Noted: 2019-05-02 | Resolved: 2020-02-11

## 2020-02-11 PROBLEM — E87.6 HYPOKALEMIA DUE TO LOSS OF POTASSIUM: Status: RESOLVED | Noted: 2019-05-01 | Resolved: 2020-02-11

## 2020-02-11 PROBLEM — O99.213 OBESITY AFFECTING PREGNANCY IN THIRD TRIMESTER: Status: RESOLVED | Noted: 2019-04-30 | Resolved: 2020-02-11

## 2020-02-11 PROBLEM — O14.13 SEVERE PRE-ECLAMPSIA IN THIRD TRIMESTER: Status: RESOLVED | Noted: 2019-05-02 | Resolved: 2020-02-11

## 2020-02-11 PROBLEM — O10.919 CHRONIC HYPERTENSION AFFECTING PREGNANCY: Status: RESOLVED | Noted: 2019-04-02 | Resolved: 2020-02-11

## 2020-02-11 PROBLEM — O10.913 CHRONIC HYPERTENSION WITH EXACERBATION DURING PREGNANCY IN THIRD TRIMESTER: Status: RESOLVED | Noted: 2019-05-01 | Resolved: 2020-02-11

## 2020-02-20 ENCOUNTER — TELEPHONE (OUTPATIENT)
Dept: OBSTETRICS AND GYNECOLOGY | Facility: CLINIC | Age: 30
End: 2020-02-20

## 2020-03-02 ENCOUNTER — PATIENT MESSAGE (OUTPATIENT)
Dept: OBSTETRICS AND GYNECOLOGY | Facility: CLINIC | Age: 30
End: 2020-03-02

## 2020-03-10 ENCOUNTER — OFFICE VISIT (OUTPATIENT)
Dept: OBSTETRICS AND GYNECOLOGY | Facility: CLINIC | Age: 30
End: 2020-03-10
Payer: MEDICAID

## 2020-03-10 VITALS
SYSTOLIC BLOOD PRESSURE: 118 MMHG | BODY MASS INDEX: 40.39 KG/M2 | DIASTOLIC BLOOD PRESSURE: 76 MMHG | WEIGHT: 213.75 LBS

## 2020-03-10 DIAGNOSIS — N91.2 ABSENT MENSES: Primary | ICD-10-CM

## 2020-03-10 DIAGNOSIS — Z34.90 PREGNANCY, UNSPECIFIED GESTATIONAL AGE: ICD-10-CM

## 2020-03-10 DIAGNOSIS — Z36.82 ENCOUNTER FOR (NT) NUCHAL TRANSLUCENCY SCAN: ICD-10-CM

## 2020-03-10 LAB
B-HCG UR QL: POSITIVE
CTP QC/QA: YES

## 2020-03-10 PROCEDURE — 99999 PR PBB SHADOW E&M-EST. PATIENT-LVL III: CPT | Mod: PBBFAC,,, | Performed by: ADVANCED PRACTICE MIDWIFE

## 2020-03-10 PROCEDURE — 84156 ASSAY OF PROTEIN URINE: CPT

## 2020-03-10 PROCEDURE — 99213 OFFICE O/P EST LOW 20 MIN: CPT | Mod: TH,S$PBB,, | Performed by: ADVANCED PRACTICE MIDWIFE

## 2020-03-10 PROCEDURE — 99213 PR OFFICE/OUTPT VISIT, EST, LEVL III, 20-29 MIN: ICD-10-PCS | Mod: TH,S$PBB,, | Performed by: ADVANCED PRACTICE MIDWIFE

## 2020-03-10 PROCEDURE — 99213 OFFICE O/P EST LOW 20 MIN: CPT | Mod: PBBFAC,TH,PO | Performed by: ADVANCED PRACTICE MIDWIFE

## 2020-03-10 PROCEDURE — 99999 PR PBB SHADOW E&M-EST. PATIENT-LVL III: ICD-10-PCS | Mod: PBBFAC,,, | Performed by: ADVANCED PRACTICE MIDWIFE

## 2020-03-10 PROCEDURE — 81025 URINE PREGNANCY TEST: CPT | Mod: PBBFAC,PO | Performed by: ADVANCED PRACTICE MIDWIFE

## 2020-03-10 RX ORDER — ONDANSETRON 4 MG/1
4 TABLET, FILM COATED ORAL DAILY PRN
Qty: 20 TABLET | Refills: 0 | Status: SHIPPED | OUTPATIENT
Start: 2020-03-10 | End: 2020-04-27 | Stop reason: CLARIF

## 2020-03-10 NOTE — PROGRESS NOTES
HISTORY OF PRESENT ILLNESS:    Macie Guzman is a 29 y.o. female, ,  Patient's last menstrual period was 2019 (exact date).  for a routine exam complaining of amenorrhea.   States she had an ultrasound in the ED in [facility] on 20 where they did not see cardiac activity. Beta HCG done at that time. Pt states she was having bleeding at that time that has since stopped. She c/o nausea, vomiting and breast tenderness.     History reviewed. No pertinent surgical history.    MEDICATIONS AND ALLERGIES:      Current Outpatient Medications:     aspirin 81 MG Chew, Take 81 mg by mouth once daily., Disp: , Rfl:     labetalol (NORMODYNE) 100 MG tablet, Take 1 tablet (100 mg total) by mouth 2 (two) times daily. (Patient not taking: Reported on 3/10/2020), Disp: 60 tablet, Rfl: 11    ondansetron (ZOFRAN) 4 MG tablet, Take 1 tablet (4 mg total) by mouth daily as needed for Nausea., Disp: 20 tablet, Rfl: 0    prenat.vits,alem,min-iron-folic (PRENATAL VITAMIN) Tab, Take 1 tablet by mouth once daily., Disp: 30 each, Rfl: 8    Review of patient's allergies indicates:  No Known Allergies      COMPREHENSIVE GYN HISTORY:  PAP History: Denies abnormal Paps.  Infection History: Denies STDs. Denies PID.  Benign History: Denies uterine fibroids. Denies ovarian cysts. Denies endometriosis. Denies other conditions.  Cancer History: Denies cervical cancer. Denies uterine cancer or hyperplasia. Denies ovarian cancer. Denies vulvar cancer or pre-cancer. Denies vaginal cancer or pre-cancer. Denies breast cancer. Denies colon cancer.  Sexual Activity History: Reports currently being sexually active  Menstrual History: None.  Contraception: None    ROS:  GENERAL: No weight changes. No swelling. No fatigue. No fever.  CARDIOVASCULAR: No chest pain. No shortness of breath. No leg cramps.   NEUROLOGICAL: No headaches. No vision changes.  BREASTS: No pain. No lumps. No discharge.  ABDOMEN: No pain. No nausea. No vomiting. No  diarrhea. No constipation.  REPRODUCTIVE: No abnormal bleeding.   VULVA: No pain. No lesions. No itching.  VAGINA: No relaxation. No itching. No odor. No discharge. No lesions.  URINARY: No incontinence. No nocturia. No frequency. No dysuria.    /76   Wt 97 kg (213 lb 11.8 oz)   LMP 2019 (Exact Date)   BMI 40.39 kg/m²     PE:  AFFECT: Alert and oriented X 3. Interactive during exam  GENERAL: Appearance well-nourished, well-developed, in no acute distress.  HEENT: WNL  TEETH: Good dentition.  Pelvic Exam: Deferred    PROCEDURES:  UPT Positive      DIAGNOSIS:  Gyn exam  IUP with stated LMP of 19    PLAN:  Dating Ultrasound  Beta HCG  New OB labs    MEDICATIONS PRESCRIBED:  PNV  Zofran    LABS AND TESTS ORDERED:  New Ob Labs      1st TRIMESTER COUNSELING: Discussed all, booklet provided  Common complaints of pregnancy  HIV and other routine prenatal tests including  genetic screening  Risk factors identified by prenatal history  Anticipated course of prenatal care  Nutrition and weight gain counseling  Toxoplasmosis precautions (Cats/Raw Meat)  Sexual activity and exercise  Environmental/Work hazards  Travel  Tobacco (Ask, Advise, Assess, Assist, and Arrange), as well as alcohol and drug use  Use of any medications (Including supplements, Vitamins, Herbs, or OTC Drugs)  Indications for Ultrasound  Domestic violence  Seat belt use  Childbirth classes/Hospital facilities     TERATOLOGY COUNSELING: Discussed options for sequential Screen                                                                                                     Pt desires NT testing - orders placed      FOLLOW-UP for a New Ob Visit in   2  weeks

## 2020-03-12 LAB
CREAT UR-MCNC: 263 MG/DL (ref 15–325)
PROT UR-MCNC: 28 MG/DL (ref 0–15)
PROT/CREAT UR: 0.11 MG/G{CREAT} (ref 0–0.2)

## 2020-03-13 ENCOUNTER — TELEPHONE (OUTPATIENT)
Dept: OBSTETRICS AND GYNECOLOGY | Facility: OTHER | Age: 30
End: 2020-03-13

## 2020-03-13 NOTE — TELEPHONE ENCOUNTER
Called patient. Spoke to patient. Patient will attend her appointment on today for labs and ultrasound. Once ultrasound is done , patient would like to be scheduled for NIPT testing.

## 2020-03-17 ENCOUNTER — PATIENT MESSAGE (OUTPATIENT)
Dept: OBSTETRICS AND GYNECOLOGY | Facility: CLINIC | Age: 30
End: 2020-03-17

## 2020-03-24 ENCOUNTER — PATIENT MESSAGE (OUTPATIENT)
Dept: OBSTETRICS AND GYNECOLOGY | Facility: CLINIC | Age: 30
End: 2020-03-24

## 2020-03-24 DIAGNOSIS — O20.0 THREATENED ABORTION: Primary | ICD-10-CM

## 2020-03-24 NOTE — TELEPHONE ENCOUNTER
Hi, Macie- it dies seem like your pregnancy has not progressed but in order to make sure we need to have the quantitative Hcg drawn thru the lab. The clinic in [facility] does have a lab and you would not have to come to [place] and knowing that lab value would tell me if we can hold off on an US for awhile to keep you from having to come here. Can you go to the [facility]?

## 2020-03-27 NOTE — TELEPHONE ENCOUNTER
Macie Will- you can have the blood work done at [facility]- the lab is on the second floor across from 's coffee shop.We had put in the order for [facility] because you had stated that was where you wanted to go. Let's get the blood work done and when I get the results I will schedule your ultrasound appointment.

## 2020-03-28 ENCOUNTER — TELEPHONE (OUTPATIENT)
Dept: OBSTETRICS AND GYNECOLOGY | Facility: CLINIC | Age: 30
End: 2020-03-28

## 2020-03-28 ENCOUNTER — PATIENT MESSAGE (OUTPATIENT)
Dept: OBSTETRICS AND GYNECOLOGY | Facility: OTHER | Age: 30
End: 2020-03-28

## 2020-03-28 ENCOUNTER — PATIENT MESSAGE (OUTPATIENT)
Dept: OBSTETRICS AND GYNECOLOGY | Facility: CLINIC | Age: 30
End: 2020-03-28

## 2020-03-30 ENCOUNTER — PATIENT MESSAGE (OUTPATIENT)
Dept: OBSTETRICS AND GYNECOLOGY | Facility: CLINIC | Age: 30
End: 2020-03-30

## 2020-04-06 ENCOUNTER — PATIENT MESSAGE (OUTPATIENT)
Dept: OBSTETRICS AND GYNECOLOGY | Facility: CLINIC | Age: 30
End: 2020-04-06

## 2020-04-06 DIAGNOSIS — N91.2 AMENORRHEA: Primary | ICD-10-CM

## 2020-04-06 RX ORDER — LABETALOL 100 MG/1
100 TABLET, FILM COATED ORAL 2 TIMES DAILY
Qty: 60 TABLET | Refills: 11 | Status: SHIPPED | OUTPATIENT
Start: 2020-04-06 | End: 2020-11-20

## 2020-04-07 ENCOUNTER — TELEPHONE (OUTPATIENT)
Dept: OBSTETRICS AND GYNECOLOGY | Facility: CLINIC | Age: 30
End: 2020-04-07

## 2020-04-07 NOTE — TELEPHONE ENCOUNTER
Good morning, Macie- your Hcg levels have increased and therefore we need you to come in to have an ultrasound. I will have someone from scheduling call you today to get that appointment scheduled.

## 2020-04-17 ENCOUNTER — PROCEDURE VISIT (OUTPATIENT)
Dept: OBSTETRICS AND GYNECOLOGY | Facility: CLINIC | Age: 30
End: 2020-04-17
Payer: MEDICAID

## 2020-04-17 DIAGNOSIS — Z34.90 PREGNANCY, UNSPECIFIED GESTATIONAL AGE: ICD-10-CM

## 2020-04-17 DIAGNOSIS — O99.210 MATERNAL MORBID OBESITY, ANTEPARTUM: ICD-10-CM

## 2020-04-17 DIAGNOSIS — E66.01 MATERNAL MORBID OBESITY, ANTEPARTUM: ICD-10-CM

## 2020-04-17 PROCEDURE — 76815 OB US LIMITED FETUS(S): CPT | Mod: PBBFAC | Performed by: OBSTETRICS & GYNECOLOGY

## 2020-04-17 PROCEDURE — 76815 OB US LIMITED FETUS(S): CPT | Mod: 26,S$PBB,, | Performed by: OBSTETRICS & GYNECOLOGY

## 2020-04-17 PROCEDURE — 99499 NO LOS: ICD-10-PCS | Mod: S$PBB,,, | Performed by: OBSTETRICS & GYNECOLOGY

## 2020-04-17 PROCEDURE — 99499 UNLISTED E&M SERVICE: CPT | Mod: S$PBB,,, | Performed by: OBSTETRICS & GYNECOLOGY

## 2020-04-17 PROCEDURE — 76815 PR  US,PREGNANT UTERUS,LIMITED, 1/> FETUSES: ICD-10-PCS | Mod: 26,S$PBB,, | Performed by: OBSTETRICS & GYNECOLOGY

## 2020-04-18 ENCOUNTER — PATIENT MESSAGE (OUTPATIENT)
Dept: OBSTETRICS AND GYNECOLOGY | Facility: CLINIC | Age: 30
End: 2020-04-18

## 2020-04-21 ENCOUNTER — PATIENT MESSAGE (OUTPATIENT)
Dept: OBSTETRICS AND GYNECOLOGY | Facility: CLINIC | Age: 30
End: 2020-04-21

## 2020-04-23 ENCOUNTER — PATIENT MESSAGE (OUTPATIENT)
Dept: OBSTETRICS AND GYNECOLOGY | Facility: CLINIC | Age: 30
End: 2020-04-23

## 2020-04-24 ENCOUNTER — PATIENT MESSAGE (OUTPATIENT)
Dept: OBSTETRICS AND GYNECOLOGY | Facility: CLINIC | Age: 30
End: 2020-04-24

## 2020-04-24 ENCOUNTER — OFFICE VISIT (OUTPATIENT)
Dept: OBSTETRICS AND GYNECOLOGY | Facility: CLINIC | Age: 30
End: 2020-04-24
Payer: MEDICAID

## 2020-04-24 DIAGNOSIS — Z34.82 ENCOUNTER FOR SUPERVISION OF OTHER NORMAL PREGNANCY IN SECOND TRIMESTER: Primary | ICD-10-CM

## 2020-04-24 PROCEDURE — 99499 NO LOS: ICD-10-PCS | Mod: 95,,, | Performed by: ADVANCED PRACTICE MIDWIFE

## 2020-04-24 PROCEDURE — 99499 UNLISTED E&M SERVICE: CPT | Mod: 95,,, | Performed by: ADVANCED PRACTICE MIDWIFE

## 2020-04-24 NOTE — PROGRESS NOTES
The patient location is: home  The chief complaint leading to consultation is: prenatal visit  Visit type: audio only  Total time spent with patient: 54  Each patient to whom he or she provides medical services by telemedicine is:  (1) informed of the relationship between the physician and patient and the respective role of any other health care provider with respect to management of the patient; and (2) notified that he or she may decline to receive medical services by telemedicine and may withdraw from such care at any time.    Notes: Pt did not connect to telemedicine and no answer at number at scheduled time. Was able to contact pt per phone at 1145 and pt reports is planning on termination. Advised pt to schedule an appointment if does not proceed with procedure.

## 2020-04-27 ENCOUNTER — TELEPHONE (OUTPATIENT)
Dept: OBSTETRICS AND GYNECOLOGY | Facility: CLINIC | Age: 30
End: 2020-04-27

## 2020-04-27 ENCOUNTER — PATIENT MESSAGE (OUTPATIENT)
Dept: OBSTETRICS AND GYNECOLOGY | Facility: CLINIC | Age: 30
End: 2020-04-27

## 2020-04-28 ENCOUNTER — OFFICE VISIT (OUTPATIENT)
Dept: OBSTETRICS AND GYNECOLOGY | Facility: CLINIC | Age: 30
End: 2020-04-28
Payer: MEDICAID

## 2020-04-28 DIAGNOSIS — Z3A.17 17 WEEKS GESTATION OF PREGNANCY: Primary | ICD-10-CM

## 2020-04-28 DIAGNOSIS — O16.2 HYPERTENSION AFFECTING PREGNANCY IN SECOND TRIMESTER: ICD-10-CM

## 2020-04-28 DIAGNOSIS — B02.8 HERPES ZOSTER WITH COMPLICATION: ICD-10-CM

## 2020-04-28 PROCEDURE — 99213 PR OFFICE/OUTPT VISIT, EST, LEVL III, 20-29 MIN: ICD-10-PCS | Mod: 95,TH,, | Performed by: ADVANCED PRACTICE MIDWIFE

## 2020-04-28 PROCEDURE — 99213 OFFICE O/P EST LOW 20 MIN: CPT | Mod: 95,TH,, | Performed by: ADVANCED PRACTICE MIDWIFE

## 2020-04-28 RX ORDER — LIDOCAINE 40 MG/G
CREAM TOPICAL
Qty: 45 G | Refills: 0 | Status: SHIPPED | OUTPATIENT
Start: 2020-04-28 | End: 2020-05-05

## 2020-04-28 RX ORDER — LIDOCAINE 40 MG/G
CREAM TOPICAL
Qty: 45 G | Refills: 0 | Status: SHIPPED | OUTPATIENT
Start: 2020-04-28 | End: 2020-04-28 | Stop reason: SDUPTHER

## 2020-04-28 RX ORDER — ACETAMINOPHEN 500 MG
1000 TABLET ORAL EVERY 6 HOURS PRN
Qty: 50 TABLET | Refills: 0 | Status: SHIPPED | OUTPATIENT
Start: 2020-04-28 | End: 2020-11-20

## 2020-05-01 PROBLEM — O16.2 HYPERTENSION AFFECTING PREGNANCY IN SECOND TRIMESTER: Status: ACTIVE | Noted: 2020-05-01

## 2020-05-01 NOTE — PROGRESS NOTES
The patient location is: home  The chief complaint leading to consultation is: routine prenatal visit  Visit type: audio only  Total time spent with patient: 10  Each patient to whom he or she provides medical services by telemedicine is:  (1) informed of the relationship between the physician and patient and the respective role of any other health care provider with respect to management of the patient; and (2) notified that he or she may decline to receive medical services by telemedicine and may withdraw from such care at any time.    Notes:   Chief Complaint   Patient presents with    Routine Prenatal Visit       29 y.o., at 17w3d by Estimated Date of Delivery: 10/6/20    Complaints today: None- taking meds for herpes zoster    ROS  OBSTETRICS:   Contractions denies   Bleeding denies   Loss of fluid denies   Fetal mvmnt Reports flutters  GASTRO:   Nausea none   Vomiting none      OB History    Para Term  AB Living   5 4 2 2   4   SAB TAB Ectopic Multiple Live Births         0 4      # Outcome Date GA Lbr Erick/2nd Weight Sex Delivery Anes PTL Lv   5 Current            4  19 34w5d   F Vag-Spont EPI  TANNER   3  17 36w0d   M Vag-Spont EPI  TANNER      Birth Comments: induced for pre - eclampsia      Complications: Severe pre-eclampsia   2 Term 14 39w0d  2.892 kg (6 lb 6 oz) F Vag-Spont EPI N TANNER      Complications: Gestational (pregnancy-induced) hypertension without significant proteinuria, first trimester   1 Term 11 37w0d  3.345 kg (7 lb 6 oz) F Vag-Spont  N TANNER       Dating reviewed  Allergies and problem list reviewed and updated  Medical and surgical history reviewed  Prenatal labs reviewed and updated    PHYSICAL EXAM  LMP 2019 (Exact Date)     GENERAL: No acute distress  HEENT: Normocephalic  NEURO: Alert and oriented x3  PSYCH: Normal mood and affect  PULMONARY: Non-labored respiration; no tachypnea  ABD: Soft, gravid, nontender; no hernia or  hepatosplenomegaly    ASSESSMENT AND PLAN    pregnancy Problems (from 20 to present)     No problems associated with this episode.            Discussed labs  Anatomy scan ordered   labor precautions given  Follow-up:4 weeks

## 2020-05-04 ENCOUNTER — PATIENT MESSAGE (OUTPATIENT)
Dept: MATERNAL FETAL MEDICINE | Facility: CLINIC | Age: 30
End: 2020-05-04

## 2020-05-19 ENCOUNTER — PROCEDURE VISIT (OUTPATIENT)
Dept: MATERNAL FETAL MEDICINE | Facility: CLINIC | Age: 30
End: 2020-05-19
Payer: MEDICAID

## 2020-05-19 DIAGNOSIS — O16.2 HYPERTENSION AFFECTING PREGNANCY IN SECOND TRIMESTER: ICD-10-CM

## 2020-05-19 DIAGNOSIS — Z3A.17 17 WEEKS GESTATION OF PREGNANCY: ICD-10-CM

## 2020-05-19 DIAGNOSIS — O99.210 MATERNAL OBESITY AFFECTING PREGNANCY, ANTEPARTUM: ICD-10-CM

## 2020-05-19 DIAGNOSIS — Z36.89 ENCOUNTER FOR FETAL ANATOMIC SURVEY: ICD-10-CM

## 2020-05-19 PROCEDURE — 76811 OB US DETAILED SNGL FETUS: CPT | Mod: 26,S$PBB,, | Performed by: OBSTETRICS & GYNECOLOGY

## 2020-05-19 PROCEDURE — 76811 OB US DETAILED SNGL FETUS: CPT | Mod: PBBFAC | Performed by: OBSTETRICS & GYNECOLOGY

## 2020-05-19 PROCEDURE — 76811 PR US, OB FETAL EVAL & EXAM, TRANSABDOM,FIRST GESTATION: ICD-10-PCS | Mod: 26,S$PBB,, | Performed by: OBSTETRICS & GYNECOLOGY

## 2020-05-26 ENCOUNTER — PATIENT MESSAGE (OUTPATIENT)
Dept: OBSTETRICS AND GYNECOLOGY | Facility: CLINIC | Age: 30
End: 2020-05-26

## 2020-06-10 ENCOUNTER — PATIENT MESSAGE (OUTPATIENT)
Dept: OBSTETRICS AND GYNECOLOGY | Facility: CLINIC | Age: 30
End: 2020-06-10

## 2020-07-08 ENCOUNTER — PATIENT MESSAGE (OUTPATIENT)
Dept: OBSTETRICS AND GYNECOLOGY | Facility: CLINIC | Age: 30
End: 2020-07-08

## 2020-07-14 ENCOUNTER — PROCEDURE VISIT (OUTPATIENT)
Dept: MATERNAL FETAL MEDICINE | Facility: CLINIC | Age: 30
End: 2020-07-14
Payer: MEDICAID

## 2020-07-14 ENCOUNTER — ROUTINE PRENATAL (OUTPATIENT)
Dept: OBSTETRICS AND GYNECOLOGY | Facility: CLINIC | Age: 30
End: 2020-07-14
Payer: MEDICAID

## 2020-07-14 VITALS
SYSTOLIC BLOOD PRESSURE: 118 MMHG | BODY MASS INDEX: 44.69 KG/M2 | DIASTOLIC BLOOD PRESSURE: 60 MMHG | WEIGHT: 228.81 LBS

## 2020-07-14 DIAGNOSIS — R12 HEARTBURN: Primary | ICD-10-CM

## 2020-07-14 DIAGNOSIS — Z36.89 ENCOUNTER FOR ULTRASOUND TO CHECK FETAL GROWTH: Primary | ICD-10-CM

## 2020-07-14 DIAGNOSIS — O99.210 MATERNAL OBESITY AFFECTING PREGNANCY, ANTEPARTUM: ICD-10-CM

## 2020-07-14 DIAGNOSIS — O16.2 HYPERTENSION AFFECTING PREGNANCY IN SECOND TRIMESTER: ICD-10-CM

## 2020-07-14 PROCEDURE — 99999 PR PBB SHADOW E&M-EST. PATIENT-LVL III: ICD-10-PCS | Mod: PBBFAC,,, | Performed by: ADVANCED PRACTICE MIDWIFE

## 2020-07-14 PROCEDURE — 99999 PR PBB SHADOW E&M-EST. PATIENT-LVL III: CPT | Mod: PBBFAC,,, | Performed by: ADVANCED PRACTICE MIDWIFE

## 2020-07-14 PROCEDURE — 76816 OB US FOLLOW-UP PER FETUS: CPT | Mod: 26,S$PBB,, | Performed by: OBSTETRICS & GYNECOLOGY

## 2020-07-14 PROCEDURE — 90471 IMMUNIZATION ADMIN: CPT | Mod: PBBFAC,PO

## 2020-07-14 PROCEDURE — 76816 OB US FOLLOW-UP PER FETUS: CPT | Mod: PBBFAC | Performed by: OBSTETRICS & GYNECOLOGY

## 2020-07-14 PROCEDURE — 99213 PR OFFICE/OUTPT VISIT, EST, LEVL III, 20-29 MIN: ICD-10-PCS | Mod: TH,S$PBB,, | Performed by: ADVANCED PRACTICE MIDWIFE

## 2020-07-14 PROCEDURE — 99213 OFFICE O/P EST LOW 20 MIN: CPT | Mod: PBBFAC,25,PO | Performed by: ADVANCED PRACTICE MIDWIFE

## 2020-07-14 PROCEDURE — 99213 OFFICE O/P EST LOW 20 MIN: CPT | Mod: TH,S$PBB,, | Performed by: ADVANCED PRACTICE MIDWIFE

## 2020-07-14 PROCEDURE — 76816 PR  US,PREGNANT UTERUS,F/U,TRANSABD APP: ICD-10-PCS | Mod: 26,S$PBB,, | Performed by: OBSTETRICS & GYNECOLOGY

## 2020-07-14 RX ORDER — OMEPRAZOLE 20 MG/1
20 TABLET, DELAYED RELEASE ORAL DAILY
Qty: 30 TABLET | Refills: 1 | Status: SHIPPED | OUTPATIENT
Start: 2020-07-14 | End: 2020-11-20

## 2020-07-14 NOTE — PROGRESS NOTES
"Reason for visit: Routine Prenatal Visit (Temp 98.0, sore throat from GERD)    HPI:   30 y.o., at 28w0d by Estimated Date of Delivery: 10/6/20    Pt presents today c/o GERD. States "medication she was given in ER worked for her, but she ran out". Patient has not had 1 hour blood glucose test due to not being seen since ~17 wga. No other complaints at this time.     ROS:  OBSTETRICS  - Contractions: Andrew-Vivas, infrequently  - Bleeding: Denies  - Loss of fluid: Denies  - Fetal movement: +++  GASTRO  - Nausea: Denies  - Vomiting: Denies  NEURO  - Headache: Denies    Past medical, surgical, social, and family, and history: Reviewed and updated in EMR.  Medications: Reviewed and updated in EMR.  Allergies: Patient has no known allergies.     OB History    Para Term  AB Living   5 4 2 2   4   SAB TAB Ectopic Multiple Live Births         0 4      # Outcome Date GA Lbr Erick/2nd Weight Sex Delivery Anes PTL Lv   5 Current            4  19 34w5d   F Vag-Spont EPI  TANNER   3  17 36w0d   M Vag-Spont EPI  TANNER      Birth Comments: induced for pre - eclampsia      Complications: Severe pre-eclampsia   2 Term 14 39w0d  2.892 kg (6 lb 6 oz) F Vag-Spont EPI N TANNER      Complications: Gestational (pregnancy-induced) hypertension without significant proteinuria, first trimester   1 Term 11 37w0d  3.345 kg (7 lb 6 oz) F Vag-Spont  N TANNER       Pregnancy dating, labs, ultrasound reports, prenatal testing, and problem list: Reviewed and updated in EMR.    pregnancy Problems (from 20 to present)     No problems associated with this episode.            Vitals: /60   Wt 103.8 kg (228 lb 13.4 oz)   LMP 2019 (Exact Date)   BMI 44.69 kg/m²     Physical exam:  GENERAL: No acute distress  HEENT: Normocephalic  NEURO: Alert and oriented x3  PSYCH: Normal mood and affect  PULMONARY: Non-labored respiration; no tachypnea  ABD: Soft, gravid, nontender; no hernia or " hepatosplenomegaly    Assessment and Plan:    Heartburn  -     omeprazole (PRILOSEC OTC) 20 MG tablet; Take 1 tablet (20 mg total) by mouth once daily.  Dispense: 30 tablet; Refill: 1    Other orders  -     (In Office Administered) Tdap Vaccine         Refilled Prilosec- discussed it not being covered by her insurance; she will by OTC Pepcid if desired   Glucola test- patient will return on Thursday   3rd trimester labs- to be drawn with glucola on Thursday   [facility] US for hypertension in MFM today   TDAP today       labor precautions given  Follow-up: 2 weeks

## 2020-08-13 ENCOUNTER — PATIENT MESSAGE (OUTPATIENT)
Dept: OBSTETRICS AND GYNECOLOGY | Facility: CLINIC | Age: 30
End: 2020-08-13

## 2020-08-13 ENCOUNTER — PROCEDURE VISIT (OUTPATIENT)
Dept: MATERNAL FETAL MEDICINE | Facility: CLINIC | Age: 30
End: 2020-08-13
Payer: MEDICAID

## 2020-08-13 ENCOUNTER — LAB VISIT (OUTPATIENT)
Dept: LAB | Facility: OTHER | Age: 30
End: 2020-08-13
Attending: ADVANCED PRACTICE MIDWIFE
Payer: MEDICAID

## 2020-08-13 DIAGNOSIS — O16.3 MATERNAL HYPERTENSION IN THIRD TRIMESTER: ICD-10-CM

## 2020-08-13 DIAGNOSIS — Z36.89 ENCOUNTER FOR ULTRASOUND TO CHECK FETAL GROWTH: ICD-10-CM

## 2020-08-13 DIAGNOSIS — Z3A.32 32 WEEKS GESTATION OF PREGNANCY: Primary | ICD-10-CM

## 2020-08-13 DIAGNOSIS — Z3A.32 32 WEEKS GESTATION OF PREGNANCY: ICD-10-CM

## 2020-08-13 LAB
BASOPHILS # BLD AUTO: 0.03 K/UL (ref 0–0.2)
BASOPHILS NFR BLD: 0.3 % (ref 0–1.9)
DIFFERENTIAL METHOD: ABNORMAL
EOSINOPHIL # BLD AUTO: 0.1 K/UL (ref 0–0.5)
EOSINOPHIL NFR BLD: 1.1 % (ref 0–8)
ERYTHROCYTE [DISTWIDTH] IN BLOOD BY AUTOMATED COUNT: 12.5 % (ref 11.5–14.5)
GLUCOSE SERPL-MCNC: 139 MG/DL (ref 70–140)
HCT VFR BLD AUTO: 33.2 % (ref 37–48.5)
HGB BLD-MCNC: 11 G/DL (ref 12–16)
IMM GRANULOCYTES # BLD AUTO: 0.17 K/UL (ref 0–0.04)
IMM GRANULOCYTES NFR BLD AUTO: 1.6 % (ref 0–0.5)
LYMPHOCYTES # BLD AUTO: 1.6 K/UL (ref 1–4.8)
LYMPHOCYTES NFR BLD: 14.5 % (ref 18–48)
MCH RBC QN AUTO: 28.4 PG (ref 27–31)
MCHC RBC AUTO-ENTMCNC: 33.1 G/DL (ref 32–36)
MCV RBC AUTO: 86 FL (ref 82–98)
MONOCYTES # BLD AUTO: 0.7 K/UL (ref 0.3–1)
MONOCYTES NFR BLD: 6 % (ref 4–15)
NEUTROPHILS # BLD AUTO: 8.3 K/UL (ref 1.8–7.7)
NEUTROPHILS NFR BLD: 76.5 % (ref 38–73)
NRBC BLD-RTO: 0 /100 WBC
PLATELET # BLD AUTO: 223 K/UL (ref 150–350)
PMV BLD AUTO: 11.1 FL (ref 9.2–12.9)
RBC # BLD AUTO: 3.87 M/UL (ref 4–5.4)
WBC # BLD AUTO: 10.89 K/UL (ref 3.9–12.7)

## 2020-08-13 PROCEDURE — 36415 COLL VENOUS BLD VENIPUNCTURE: CPT

## 2020-08-13 PROCEDURE — 76816 OB US FOLLOW-UP PER FETUS: CPT | Mod: 26,S$PBB,, | Performed by: OBSTETRICS & GYNECOLOGY

## 2020-08-13 PROCEDURE — 76816 PR  US,PREGNANT UTERUS,F/U,TRANSABD APP: ICD-10-PCS | Mod: 26,S$PBB,, | Performed by: OBSTETRICS & GYNECOLOGY

## 2020-08-13 PROCEDURE — 76816 OB US FOLLOW-UP PER FETUS: CPT | Mod: PBBFAC | Performed by: OBSTETRICS & GYNECOLOGY

## 2020-08-13 PROCEDURE — 76819 PR US, OB, FETAL BIOPHYSICAL, W/O NST: ICD-10-PCS | Mod: 26,S$PBB,, | Performed by: OBSTETRICS & GYNECOLOGY

## 2020-08-13 PROCEDURE — 76819 FETAL BIOPHYS PROFIL W/O NST: CPT | Mod: 26,S$PBB,, | Performed by: OBSTETRICS & GYNECOLOGY

## 2020-08-13 PROCEDURE — 76819 FETAL BIOPHYS PROFIL W/O NST: CPT | Mod: PBBFAC | Performed by: OBSTETRICS & GYNECOLOGY

## 2020-08-13 PROCEDURE — 82950 GLUCOSE TEST: CPT

## 2020-08-13 PROCEDURE — 85025 COMPLETE CBC W/AUTO DIFF WBC: CPT

## 2020-08-14 ENCOUNTER — PATIENT MESSAGE (OUTPATIENT)
Dept: OBSTETRICS AND GYNECOLOGY | Facility: CLINIC | Age: 30
End: 2020-08-14

## 2020-08-14 NOTE — TELEPHONE ENCOUNTER
The US was fine but you did not schedule any follow up appointments in the clinic. Please call and scheduled as we need to get you into prenatal testing to continue to check on the baby.You should come in by next week.

## 2020-08-26 ENCOUNTER — PATIENT MESSAGE (OUTPATIENT)
Dept: OBSTETRICS AND GYNECOLOGY | Facility: CLINIC | Age: 30
End: 2020-08-26

## 2020-08-30 ENCOUNTER — PATIENT MESSAGE (OUTPATIENT)
Dept: OBSTETRICS AND GYNECOLOGY | Facility: CLINIC | Age: 30
End: 2020-08-30

## 2020-09-08 ENCOUNTER — ANESTHESIA EVENT (OUTPATIENT)
Dept: OBSTETRICS AND GYNECOLOGY | Facility: OTHER | Age: 30
End: 2020-09-08
Payer: MEDICAID

## 2020-09-08 ENCOUNTER — HOSPITAL ENCOUNTER (INPATIENT)
Facility: OTHER | Age: 30
LOS: 2 days | Discharge: HOME | End: 2020-09-10
Attending: OBSTETRICS & GYNECOLOGY | Admitting: OBSTETRICS & GYNECOLOGY
Payer: MEDICAID

## 2020-09-08 ENCOUNTER — ANESTHESIA (OUTPATIENT)
Dept: OBSTETRICS AND GYNECOLOGY | Facility: OTHER | Age: 30
End: 2020-09-08
Payer: MEDICAID

## 2020-09-08 ENCOUNTER — PATIENT MESSAGE (OUTPATIENT)
Dept: OBSTETRICS AND GYNECOLOGY | Facility: CLINIC | Age: 30
End: 2020-09-08

## 2020-09-08 DIAGNOSIS — O10.919 CHRONIC HYPERTENSION AFFECTING PREGNANCY: ICD-10-CM

## 2020-09-08 DIAGNOSIS — Z3A.36 36 WEEKS GESTATION OF PREGNANCY: ICD-10-CM

## 2020-09-08 DIAGNOSIS — O14.93 PRE-ECLAMPSIA IN THIRD TRIMESTER: ICD-10-CM

## 2020-09-08 DIAGNOSIS — O14.90 PRE-ECLAMPSIA: ICD-10-CM

## 2020-09-08 DIAGNOSIS — O47.9 UTERINE CONTRACTIONS DURING PREGNANCY: ICD-10-CM

## 2020-09-08 DIAGNOSIS — Z30.2 ENCOUNTER FOR STERILIZATION: ICD-10-CM

## 2020-09-08 LAB
ABO + RH BLD: NORMAL
ALBUMIN SERPL BCP-MCNC: 3.1 G/DL (ref 3.5–5.2)
ALBUMIN SERPL BCP-MCNC: 3.2 G/DL (ref 3.5–5.2)
ALP SERPL-CCNC: 136 U/L (ref 55–135)
ALP SERPL-CCNC: 141 U/L (ref 55–135)
ALT SERPL W/O P-5'-P-CCNC: 6 U/L (ref 10–44)
ALT SERPL W/O P-5'-P-CCNC: 8 U/L (ref 10–44)
ANION GAP SERPL CALC-SCNC: 10 MMOL/L (ref 8–16)
ANION GAP SERPL CALC-SCNC: 9 MMOL/L (ref 8–16)
AST SERPL-CCNC: 11 U/L (ref 10–40)
AST SERPL-CCNC: 12 U/L (ref 10–40)
BASOPHILS # BLD AUTO: 0.03 K/UL (ref 0–0.2)
BASOPHILS # BLD AUTO: 0.06 K/UL (ref 0–0.2)
BASOPHILS NFR BLD: 0.2 % (ref 0–1.9)
BASOPHILS NFR BLD: 0.5 % (ref 0–1.9)
BILIRUB SERPL-MCNC: 0.3 MG/DL (ref 0.1–1)
BILIRUB SERPL-MCNC: 0.4 MG/DL (ref 0.1–1)
BLD GP AB SCN CELLS X3 SERPL QL: NORMAL
BUN SERPL-MCNC: 3 MG/DL (ref 6–20)
BUN SERPL-MCNC: 5 MG/DL (ref 6–20)
CALCIUM SERPL-MCNC: 8.4 MG/DL (ref 8.7–10.5)
CALCIUM SERPL-MCNC: 8.9 MG/DL (ref 8.7–10.5)
CHLORIDE SERPL-SCNC: 106 MMOL/L (ref 95–110)
CHLORIDE SERPL-SCNC: 109 MMOL/L (ref 95–110)
CO2 SERPL-SCNC: 21 MMOL/L (ref 23–29)
CO2 SERPL-SCNC: 21 MMOL/L (ref 23–29)
CREAT SERPL-MCNC: 0.6 MG/DL (ref 0.5–1.4)
CREAT SERPL-MCNC: 0.6 MG/DL (ref 0.5–1.4)
CREAT UR-MCNC: 133 MG/DL (ref 15–325)
DIFFERENTIAL METHOD: ABNORMAL
DIFFERENTIAL METHOD: ABNORMAL
EOSINOPHIL # BLD AUTO: 0 K/UL (ref 0–0.5)
EOSINOPHIL # BLD AUTO: 0.1 K/UL (ref 0–0.5)
EOSINOPHIL NFR BLD: 0.1 % (ref 0–8)
EOSINOPHIL NFR BLD: 0.8 % (ref 0–8)
ERYTHROCYTE [DISTWIDTH] IN BLOOD BY AUTOMATED COUNT: 12.9 % (ref 11.5–14.5)
ERYTHROCYTE [DISTWIDTH] IN BLOOD BY AUTOMATED COUNT: 12.9 % (ref 11.5–14.5)
EST. GFR  (AFRICAN AMERICAN): >60 ML/MIN/1.73 M^2
EST. GFR  (AFRICAN AMERICAN): >60 ML/MIN/1.73 M^2
EST. GFR  (NON AFRICAN AMERICAN): >60 ML/MIN/1.73 M^2
EST. GFR  (NON AFRICAN AMERICAN): >60 ML/MIN/1.73 M^2
GLUCOSE SERPL-MCNC: 93 MG/DL (ref 70–110)
GLUCOSE SERPL-MCNC: 97 MG/DL (ref 70–110)
HCT VFR BLD AUTO: 33.5 % (ref 37–48.5)
HCT VFR BLD AUTO: 34.7 % (ref 37–48.5)
HGB BLD-MCNC: 10.9 G/DL (ref 12–16)
HGB BLD-MCNC: 11.1 G/DL (ref 12–16)
IMM GRANULOCYTES # BLD AUTO: 0.17 K/UL (ref 0–0.04)
IMM GRANULOCYTES # BLD AUTO: 0.19 K/UL (ref 0–0.04)
IMM GRANULOCYTES NFR BLD AUTO: 1.2 % (ref 0–0.5)
IMM GRANULOCYTES NFR BLD AUTO: 1.7 % (ref 0–0.5)
INFLUENZA A, MOLECULAR: NEGATIVE
INFLUENZA B, MOLECULAR: NEGATIVE
LYMPHOCYTES # BLD AUTO: 1.1 K/UL (ref 1–4.8)
LYMPHOCYTES # BLD AUTO: 1.8 K/UL (ref 1–4.8)
LYMPHOCYTES NFR BLD: 16.4 % (ref 18–48)
LYMPHOCYTES NFR BLD: 7.8 % (ref 18–48)
MCH RBC QN AUTO: 27.1 PG (ref 27–31)
MCH RBC QN AUTO: 27.5 PG (ref 27–31)
MCHC RBC AUTO-ENTMCNC: 32 G/DL (ref 32–36)
MCHC RBC AUTO-ENTMCNC: 32.5 G/DL (ref 32–36)
MCV RBC AUTO: 85 FL (ref 82–98)
MCV RBC AUTO: 85 FL (ref 82–98)
MONOCYTES # BLD AUTO: 0.4 K/UL (ref 0.3–1)
MONOCYTES # BLD AUTO: 0.8 K/UL (ref 0.3–1)
MONOCYTES NFR BLD: 2.7 % (ref 4–15)
MONOCYTES NFR BLD: 7.4 % (ref 4–15)
NEUTROPHILS # BLD AUTO: 12.8 K/UL (ref 1.8–7.7)
NEUTROPHILS # BLD AUTO: 8.1 K/UL (ref 1.8–7.7)
NEUTROPHILS NFR BLD: 73.2 % (ref 38–73)
NEUTROPHILS NFR BLD: 88 % (ref 38–73)
NRBC BLD-RTO: 0 /100 WBC
NRBC BLD-RTO: 0 /100 WBC
PLATELET # BLD AUTO: 212 K/UL (ref 150–350)
PLATELET # BLD AUTO: 215 K/UL (ref 150–350)
PMV BLD AUTO: 11 FL (ref 9.2–12.9)
PMV BLD AUTO: 11.2 FL (ref 9.2–12.9)
POTASSIUM SERPL-SCNC: 3.6 MMOL/L (ref 3.5–5.1)
POTASSIUM SERPL-SCNC: 4.1 MMOL/L (ref 3.5–5.1)
PROT SERPL-MCNC: 6.8 G/DL (ref 6–8.4)
PROT SERPL-MCNC: 7.1 G/DL (ref 6–8.4)
PROT UR-MCNC: 20 MG/DL (ref 0–15)
PROT/CREAT UR: 0.15 MG/G{CREAT} (ref 0–0.2)
RBC # BLD AUTO: 3.96 M/UL (ref 4–5.4)
RBC # BLD AUTO: 4.1 M/UL (ref 4–5.4)
SARS-COV-2 RDRP RESP QL NAA+PROBE: NEGATIVE
SODIUM SERPL-SCNC: 137 MMOL/L (ref 136–145)
SODIUM SERPL-SCNC: 139 MMOL/L (ref 136–145)
SPECIMEN SOURCE: NORMAL
WBC # BLD AUTO: 11.08 K/UL (ref 3.9–12.7)
WBC # BLD AUTO: 14.57 K/UL (ref 3.9–12.7)

## 2020-09-08 PROCEDURE — 96375 TX/PRO/DX INJ NEW DRUG ADDON: CPT

## 2020-09-08 PROCEDURE — 25000003 PHARM REV CODE 250: Performed by: STUDENT IN AN ORGANIZED HEALTH CARE EDUCATION/TRAINING PROGRAM

## 2020-09-08 PROCEDURE — 80053 COMPREHEN METABOLIC PANEL: CPT | Mod: 91

## 2020-09-08 PROCEDURE — 86703 HIV-1/HIV-2 1 RESULT ANTBDY: CPT

## 2020-09-08 PROCEDURE — 96372 THER/PROPH/DIAG INJ SC/IM: CPT

## 2020-09-08 PROCEDURE — 84156 ASSAY OF PROTEIN URINE: CPT

## 2020-09-08 PROCEDURE — U0002 COVID-19 LAB TEST NON-CDC: HCPCS

## 2020-09-08 PROCEDURE — 86850 RBC ANTIBODY SCREEN: CPT

## 2020-09-08 PROCEDURE — 11000001 HC ACUTE MED/SURG PRIVATE ROOM

## 2020-09-08 PROCEDURE — 85025 COMPLETE CBC W/AUTO DIFF WBC: CPT | Mod: 91

## 2020-09-08 PROCEDURE — 99285 EMERGENCY DEPT VISIT HI MDM: CPT | Mod: 25

## 2020-09-08 PROCEDURE — 99284 PR EMERGENCY DEPT VISIT,LEVEL IV: ICD-10-PCS | Mod: 25,,, | Performed by: OBSTETRICS & GYNECOLOGY

## 2020-09-08 PROCEDURE — 80053 COMPREHEN METABOLIC PANEL: CPT

## 2020-09-08 PROCEDURE — 63600175 PHARM REV CODE 636 W HCPCS: Performed by: STUDENT IN AN ORGANIZED HEALTH CARE EDUCATION/TRAINING PROGRAM

## 2020-09-08 PROCEDURE — 59025 PR FETAL 2N-STRESS TEST: ICD-10-PCS | Mod: 26,,, | Performed by: OBSTETRICS & GYNECOLOGY

## 2020-09-08 PROCEDURE — 86592 SYPHILIS TEST NON-TREP QUAL: CPT

## 2020-09-08 PROCEDURE — 99284 EMERGENCY DEPT VISIT MOD MDM: CPT | Mod: 25,,, | Performed by: OBSTETRICS & GYNECOLOGY

## 2020-09-08 PROCEDURE — 87502 INFLUENZA DNA AMP PROBE: CPT

## 2020-09-08 PROCEDURE — 72100002 HC LABOR CARE, 1ST 8 HOURS

## 2020-09-08 PROCEDURE — 87081 CULTURE SCREEN ONLY: CPT

## 2020-09-08 PROCEDURE — 59025 FETAL NON-STRESS TEST: CPT | Mod: 26,,, | Performed by: OBSTETRICS & GYNECOLOGY

## 2020-09-08 PROCEDURE — 96374 THER/PROPH/DIAG INJ IV PUSH: CPT

## 2020-09-08 RX ORDER — CALCIUM GLUCONATE 98 MG/ML
1 INJECTION, SOLUTION INTRAVENOUS
Status: DISCONTINUED | OUTPATIENT
Start: 2020-09-08 | End: 2020-09-10 | Stop reason: HOSPADM

## 2020-09-08 RX ORDER — LABETALOL 100 MG/1
100 TABLET, FILM COATED ORAL 2 TIMES DAILY
Status: DISCONTINUED | OUTPATIENT
Start: 2020-09-08 | End: 2020-09-10 | Stop reason: HOSPADM

## 2020-09-08 RX ORDER — PROCHLORPERAZINE MALEATE 5 MG
5 TABLET ORAL ONCE
Status: COMPLETED | OUTPATIENT
Start: 2020-09-08 | End: 2020-09-08

## 2020-09-08 RX ORDER — MAGNESIUM SULFATE HEPTAHYDRATE 40 MG/ML
4 INJECTION, SOLUTION INTRAVENOUS ONCE
Status: COMPLETED | OUTPATIENT
Start: 2020-09-08 | End: 2020-09-08

## 2020-09-08 RX ORDER — OXYTOCIN/RINGER'S LACTATE 30/500 ML
2 PLASTIC BAG, INJECTION (ML) INTRAVENOUS CONTINUOUS
Status: DISCONTINUED | OUTPATIENT
Start: 2020-09-08 | End: 2020-09-10 | Stop reason: HOSPADM

## 2020-09-08 RX ORDER — SODIUM CHLORIDE, SODIUM LACTATE, POTASSIUM CHLORIDE, CALCIUM CHLORIDE 600; 310; 30; 20 MG/100ML; MG/100ML; MG/100ML; MG/100ML
INJECTION, SOLUTION INTRAVENOUS CONTINUOUS
Status: DISCONTINUED | OUTPATIENT
Start: 2020-09-08 | End: 2020-09-08

## 2020-09-08 RX ORDER — SODIUM CHLORIDE, SODIUM LACTATE, POTASSIUM CHLORIDE, CALCIUM CHLORIDE 600; 310; 30; 20 MG/100ML; MG/100ML; MG/100ML; MG/100ML
INJECTION, SOLUTION INTRAVENOUS CONTINUOUS
Status: DISCONTINUED | OUTPATIENT
Start: 2020-09-08 | End: 2020-09-10 | Stop reason: HOSPADM

## 2020-09-08 RX ORDER — BETAMETHASONE SODIUM PHOSPHATE AND BETAMETHASONE ACETATE 3; 3 MG/ML; MG/ML
12 INJECTION, SUSPENSION INTRA-ARTICULAR; INTRALESIONAL; INTRAMUSCULAR; SOFT TISSUE
Status: DISPENSED | OUTPATIENT
Start: 2020-09-08 | End: 2020-09-10

## 2020-09-08 RX ORDER — CALCIUM CARBONATE 200(500)MG
500 TABLET,CHEWABLE ORAL 3 TIMES DAILY PRN
Status: DISCONTINUED | OUTPATIENT
Start: 2020-09-08 | End: 2020-09-10 | Stop reason: HOSPADM

## 2020-09-08 RX ORDER — ACETAMINOPHEN 500 MG
1000 TABLET ORAL ONCE
Status: COMPLETED | OUTPATIENT
Start: 2020-09-08 | End: 2020-09-08

## 2020-09-08 RX ORDER — CEFAZOLIN SODIUM 2 G/50ML
2 SOLUTION INTRAVENOUS ONCE AS NEEDED
Status: DISCONTINUED | OUTPATIENT
Start: 2020-09-08 | End: 2020-09-10 | Stop reason: HOSPADM

## 2020-09-08 RX ORDER — ONDANSETRON 8 MG/1
8 TABLET, ORALLY DISINTEGRATING ORAL EVERY 8 HOURS PRN
Status: DISCONTINUED | OUTPATIENT
Start: 2020-09-08 | End: 2020-09-10 | Stop reason: HOSPADM

## 2020-09-08 RX ORDER — ONDANSETRON 2 MG/ML
4 INJECTION INTRAMUSCULAR; INTRAVENOUS ONCE
Status: COMPLETED | OUTPATIENT
Start: 2020-09-08 | End: 2020-09-08

## 2020-09-08 RX ORDER — OXYTOCIN/RINGER'S LACTATE 30/500 ML
95 PLASTIC BAG, INJECTION (ML) INTRAVENOUS ONCE
Status: DISCONTINUED | OUTPATIENT
Start: 2020-09-08 | End: 2020-09-10 | Stop reason: HOSPADM

## 2020-09-08 RX ORDER — LABETALOL 100 MG/1
100 TABLET, FILM COATED ORAL ONCE
Status: COMPLETED | OUTPATIENT
Start: 2020-09-08 | End: 2020-09-08

## 2020-09-08 RX ORDER — MAGNESIUM SULFATE HEPTAHYDRATE 40 MG/ML
2 INJECTION, SOLUTION INTRAVENOUS CONTINUOUS
Status: DISCONTINUED | OUTPATIENT
Start: 2020-09-08 | End: 2020-09-10 | Stop reason: HOSPADM

## 2020-09-08 RX ORDER — BUTALBITAL, ACETAMINOPHEN AND CAFFEINE 50; 325; 40 MG/1; MG/1; MG/1
2 TABLET ORAL ONCE
Status: COMPLETED | OUTPATIENT
Start: 2020-09-08 | End: 2020-09-08

## 2020-09-08 RX ORDER — SODIUM CHLORIDE 9 MG/ML
INJECTION, SOLUTION INTRAVENOUS
Status: DISCONTINUED | OUTPATIENT
Start: 2020-09-08 | End: 2020-09-10 | Stop reason: HOSPADM

## 2020-09-08 RX ORDER — DIPHENHYDRAMINE HYDROCHLORIDE 50 MG/ML
25 INJECTION INTRAMUSCULAR; INTRAVENOUS ONCE
Status: COMPLETED | OUTPATIENT
Start: 2020-09-08 | End: 2020-09-08

## 2020-09-08 RX ORDER — SIMETHICONE 80 MG
1 TABLET,CHEWABLE ORAL 4 TIMES DAILY PRN
Status: DISCONTINUED | OUTPATIENT
Start: 2020-09-08 | End: 2020-09-09

## 2020-09-08 RX ORDER — OXYTOCIN/RINGER'S LACTATE 30/500 ML
334 PLASTIC BAG, INJECTION (ML) INTRAVENOUS ONCE
Status: DISCONTINUED | OUTPATIENT
Start: 2020-09-08 | End: 2020-09-10 | Stop reason: HOSPADM

## 2020-09-08 RX ADMIN — LABETALOL HYDROCHLORIDE 100 MG: 100 TABLET, FILM COATED ORAL at 09:09

## 2020-09-08 RX ADMIN — DIPHENHYDRAMINE HYDROCHLORIDE 25 MG: 50 INJECTION INTRAMUSCULAR; INTRAVENOUS at 01:09

## 2020-09-08 RX ADMIN — BETAMETHASONE SODIUM PHOSPHATE AND BETAMETHASONE ACETATE 12 MG: 3; 3 INJECTION, SUSPENSION INTRA-ARTICULAR; INTRALESIONAL; INTRAMUSCULAR at 04:09

## 2020-09-08 RX ADMIN — LORAZEPAM 1 MG: 2 INJECTION INTRAMUSCULAR; INTRAVENOUS at 01:09

## 2020-09-08 RX ADMIN — DEXTROSE 3 MILLION UNITS: 50 INJECTION, SOLUTION INTRAVENOUS at 08:09

## 2020-09-08 RX ADMIN — LABETALOL HYDROCHLORIDE 100 MG: 100 TABLET, FILM COATED ORAL at 11:09

## 2020-09-08 RX ADMIN — BUTALBITAL, ACETAMINOPHEN AND CAFFEINE 2 TABLET: 50; 325; 40 TABLET ORAL at 11:09

## 2020-09-08 RX ADMIN — MAGNESIUM SULFATE HEPTAHYDRATE 4 G: 40 INJECTION, SOLUTION INTRAVENOUS at 03:09

## 2020-09-08 RX ADMIN — DEXTROSE 5 MILLION UNITS: 50 INJECTION, SOLUTION INTRAVENOUS at 04:09

## 2020-09-08 RX ADMIN — SODIUM CHLORIDE, SODIUM LACTATE, POTASSIUM CHLORIDE, AND CALCIUM CHLORIDE: .6; .31; .03; .02 INJECTION, SOLUTION INTRAVENOUS at 04:09

## 2020-09-08 RX ADMIN — ONDANSETRON 4 MG: 2 INJECTION INTRAMUSCULAR; INTRAVENOUS at 12:09

## 2020-09-08 RX ADMIN — PROCHLORPERAZINE MALEATE 5 MG: 5 TABLET ORAL at 12:09

## 2020-09-08 RX ADMIN — Medication 2 MILLI-UNITS/MIN: at 04:09

## 2020-09-08 RX ADMIN — ACETAMINOPHEN 1000 MG: 500 TABLET, FILM COATED ORAL at 09:09

## 2020-09-08 NOTE — ED PROVIDER NOTES
Encounter Date: 2020       History     Chief Complaint   Patient presents with    Contractions        Macie Guzman is a 30 y.o. [id number] at 36w0d presents complaining of contractions that feel to be increasing in frequency and intensity. .   This IUP is complicated by cHTN (on labetalol 100mg BID) and h/o pre-e in a prior pregnancy.  Patient reports contractions, denies vaginal bleeding, denies LOF.   Fetal Movement: normal.      She endorses a mild bilateral temple headache. She has not taken any medications for the HA, But she denies SOB, chest pain, RUQ pain, increased swelling, or any other pre-e symptoms at this time.     She also did not take her labetalol this morning.     Review of patient's allergies indicates:  No Known Allergies  Past Medical History:   Diagnosis Date    Hypertension     Hypertension affecting pregnancy in second trimester 2020     No past surgical history on file.  Family History   Problem Relation Age of Onset    Colon cancer Neg Hx     Eclampsia Neg Hx     Breast cancer Neg Hx     Ovarian cancer Neg Hx     Cancer Neg Hx      Social History     Tobacco Use    Smoking status: Never Smoker    Smokeless tobacco: Never Used   Substance Use Topics    Alcohol use: No    Drug use: No     Review of Systems   Constitutional: Negative for fever.   HENT: Negative for sinus pain and sore throat.    Eyes: Negative for photophobia and visual disturbance.   Respiratory: Negative for chest tightness and shortness of breath.    Cardiovascular: Negative for chest pain and palpitations.   Gastrointestinal: Positive for abdominal pain (contractions). Negative for abdominal distention, nausea and vomiting.   Genitourinary: Negative for dysuria and vaginal bleeding.   Musculoskeletal: Negative for back pain.   Skin: Negative for rash.   Neurological: Negative for weakness and headaches.   Hematological: Does not bruise/bleed easily.       Physical Exam     Initial Vitals   BP Pulse Resp  Temp SpO2   09/08/20 1043 09/08/20 1039 -- -- 09/08/20 1039   (!) 172/99 99   100 %      MAP       --                Physical Exam    Vitals reviewed.  Constitutional: She appears well-developed and well-nourished. She is not diaphoretic. No distress.   HENT:   Head: Normocephalic and atraumatic.   Eyes: EOM are normal.   Neck: Normal range of motion.   Cardiovascular: Normal rate.   Pulmonary/Chest: No respiratory distress.   Abdominal: Soft. Bowel sounds are normal. She exhibits no distension and no mass. There is no abdominal tenderness. There is no rebound and no guarding.   Musculoskeletal: Normal range of motion. No tenderness or edema.   Neurological: She is alert and oriented to person, place, and time.   Skin: Skin is warm and dry. No rash noted.   Psychiatric: She has a normal mood and affect.     OB LABOR EXAM:     Membranes ruptured: No.   Method: Sterile vaginal exam per MD.   Vaginal Bleeding: none present.     Dilatation: 2.   Station: -3.   Effacement: 60%.   Amniotic Fluid Color: no fluid.           ED Course   Obtain Fetal nonstress test (NST)    Date/Time: 9/8/2020 10:58 AM  Performed by: Miesha De La O MD  Authorized by: Miesha De LaO MD     Nonstress Test:     Variability:  6-25 BPM    Decelerations:  None    Accelerations:  15 bpm    Baseline:  125    Contractions:  Irregular  Biophysical Profile:     Nonstress Test Interpretation: reactive      Overall Impression:  Reassuring      Labs Reviewed   COMPREHENSIVE METABOLIC PANEL - Abnormal; Notable for the following components:       Result Value    CO2 21 (*)     BUN, Bld 5 (*)     Albumin 3.1 (*)     Alkaline Phosphatase 136 (*)     ALT 6 (*)     All other components within normal limits   CBC W/ AUTO DIFFERENTIAL - Abnormal; Notable for the following components:    RBC 3.96 (*)     Hemoglobin 10.9 (*)     Hematocrit 33.5 (*)     Immature Granulocytes 1.7 (*)     Gran # (ANC) 8.1 (*)     Immature Grans (Abs) 0.19 (*)     Gran% 73.2 (*)      Lymph% 16.4 (*)     All other components within normal limits   PROTEIN / CREATININE RATIO, URINE - Abnormal; Notable for the following components:    Protein, Urine Random 20 (*)     All other components within normal limits    Narrative:     Specimen Source->Urine   STREP B SCREEN, VAGINAL / RECTAL   INFLUENZA A & B BY MOLECULAR   SARS-COV-2 RNA AMPLIFICATION, QUAL   RPR   HIV 1 / 2 ANTIBODY   TYPE & SCREEN          Imaging Results    None          Medical Decision Making:   ED Management:  Contractions:   NST reactive and reassuring  SVE 2/60/-3 @1100 and repeat @1300 unchanged. Contractions irregular on monitor.   3rd trimester labs collected.     Elevated BP  VS with severe range pressures on admission 172/99 but repeat 155/82  Pre-e Labs collected  Fioricet 10mg given for HA  Home labetalol 100mg given  PCR 0.15  Plt 212  Cr 0.6, AST/ALT wnl  Patient still complaining of HA after fioricet and compazine . Will attempt to tx with ativan/ benadryl  BP much better controlled after PO labetalol  IV zofran given for nausea after compazine  HA still unrelieved after benadryl and ativan. Will admit to L&D for atypical pre-e w/SF (HA)                                 Clinical Impression:       ICD-10-CM ICD-9-CM   1. Uterine contractions during pregnancy  O62.2 661.20   2. 36 weeks gestation of pregnancy  Z3A.36 V22.2   3. Chronic hypertension affecting pregnancy  O10.919 642.00   4. Pre-eclampsia  O14.90 642.40             ED Disposition Condition    Send to L&D

## 2020-09-08 NOTE — ANESTHESIA PREPROCEDURE EVALUATION
[facility]  Anesthesia Pre-Operative Evaluation         Patient Name: Macie Guzman  YOB: 1990  MRN: [id number]    2020      Macie Guzman is a 30 y.o. female  @ 36w0d who presents for induction for pre-eclampsia. Pregnancy complicated by cHTN (on labetalol 100mg BID) and h/o pre-e in a prior pregnancy, limited PNC. On magnesium. For this pregnancy, patient would like epidural. Denies previous issues with neuraxial anesthesia.  Denies hx of seizures, strokes, heart failure, prior smoking, asthma, COPD, acid reflux disease, liver disease, kidney disease, bleeding disorders, anticoagulation, or back surgery.      OB History    Para Term  AB Living   5 4 2 2   4   SAB TAB Ectopic Multiple Live Births         0 4      # Outcome Date GA Lbr Erick/2nd Weight Sex Delivery Anes PTL Lv   5 Current            4  19 34w5d   F Vag-Spont EPI  TANNER   3  17 36w0d   M Vag-Spont EPI  TANNER      Birth Comments: induced for pre - eclampsia      Complications: Severe pre-eclampsia   2 Term 14 39w0d  2.892 kg (6 lb 6 oz) F Vag-Spont EPI N TANNER      Complications: Gestational (pregnancy-induced) hypertension without significant proteinuria, first trimester   1 Term 11 37w0d  3.345 kg (7 lb 6 oz) F Vag-Spont  N TANNER       Review of patient's allergies indicates:  No Known Allergies    Wt Readings from Last 1 Encounters:   20 1301 103.8 kg (228 lb 13.4 oz)       BP Readings from Last 3 Encounters:   20 134/82   20 118/60   20 (!) 151/89       Patient Active Problem List   Diagnosis    Morbid obesity with BMI of 40.0-44.9, adult    Hypertension affecting pregnancy in second trimester    Pre-eclampsia in third trimester    Pre-eclampsia       No past surgical history on file.    Social History     Socioeconomic History    Marital status: Single     Spouse name: Not on file    Number of children: Not on file     Years of education: Not on file    Highest education level: Not on file   Occupational History    Not on file   Social Needs    Financial resource strain: Not on file    Food insecurity     Worry: Not on file     Inability: Not on file    Transportation needs     Medical: Not on file     Non-medical: Not on file   Tobacco Use    Smoking status: Never Smoker    Smokeless tobacco: Never Used   Substance and Sexual Activity    Alcohol use: No    Drug use: No    Sexual activity: Yes     Partners: Male     Birth control/protection: None   Lifestyle    Physical activity     Days per week: Not on file     Minutes per session: Not on file    Stress: Not on file   Relationships    Social connections     Talks on phone: Not on file     Gets together: Not on file     Attends Yazidi service: Not on file     Active member of club or organization: Not on file     Attends meetings of clubs or organizations: Not on file     Relationship status: Not on file   Other Topics Concern    Not on file   Social History Narrative    Not on file         Chemistry        Component Value Date/Time     09/08/2020 1100    K 3.6 09/08/2020 1100     09/08/2020 1100    CO2 21 (L) 09/08/2020 1100    BUN 5 (L) 09/08/2020 1100    CREATININE 0.6 09/08/2020 1100    GLU 93 09/08/2020 1100        Component Value Date/Time    CALCIUM 8.9 09/08/2020 1100    ALKPHOS 136 (H) 09/08/2020 1100    AST 12 09/08/2020 1100    ALT 6 (L) 09/08/2020 1100    BILITOT 0.3 09/08/2020 1100    ESTGFRAFRICA >60 09/08/2020 1100    EGFRNONAA >60 09/08/2020 1100            Lab Results   Component Value Date    WBC 11.08 09/08/2020    HGB 10.9 (L) 09/08/2020    HCT 33.5 (L) 09/08/2020    MCV 85 09/08/2020     09/08/2020       No results for input(s): PT, INR, PROTIME, APTT in the last 72 hours.        Anesthesia Evaluation    I have reviewed the Patient Summary Reports.    I have reviewed the Nursing Notes. I have reviewed the NPO Status.   I  have reviewed the Medications.     Review of Systems  Anesthesia Hx:  No problems with previous Anesthesia Denies Hx of Anesthetic complications   Denies Personal Hx of Anesthesia complications.   Social:  Non-Smoker, No Alcohol Use    Hematology/Oncology:  Hematology Normal   Oncology Normal     EENT/Dental:EENT/Dental Normal   Cardiovascular:   Hypertension    Pulmonary:  Pulmonary Normal    Renal/:  Renal/ Normal     Hepatic/GI:  Hepatic/GI Normal    Musculoskeletal:  Musculoskeletal Normal    Neurological:  Neurology Normal    Endocrine:  Endocrine Normal    Psych:  Psychiatric Normal           Physical Exam  General:  Obesity    Airway/Jaw/Neck:  Airway Findings: Mouth Opening: Normal General Airway Assessment: Adult  Mallampati: IV  TM Distance: Normal, at least 6 cm  Jaw/Neck Findings:     Neck ROM: Normal ROM      Dental:  Dental Findings: In tact   Chest/Lungs:  Chest/Lungs Findings: Clear to auscultation     Heart/Vascular:  Heart Findings: Rate: Normal  Rhythm: Regular Rhythm  Sounds: Normal        Mental Status:  Mental Status Findings:  Cooperative, Alert and Oriented         Anesthesia Plan  Type of Anesthesia, risks & benefits discussed:  Anesthesia Type:  CSE, epidural, general, spinal, MAC  Patient's Preference:   Intra-op Monitoring Plan: standard ASA monitors  Intra-op Monitoring Plan Comments:   Post Op Pain Control Plan: multimodal analgesia, per primary service following discharge from PACU and IV/PO Opioids PRN  Post Op Pain Control Plan Comments:   Induction:    Beta Blocker:  Patient is not currently on a Beta-Blocker (No further documentation required).       Informed Consent: Patient understands risks and agrees with Anesthesia plan.  Questions answered. Anesthesia consent signed with patient.  ASA Score: 2     Day of Surgery Review of History & Physical:    H&P update referred to the provider.         Ready For Surgery From Anesthesia Perspective.

## 2020-09-08 NOTE — H&P
HISTORY AND PHYSICAL                                                OBSTETRICS          Subjective:      Macie Guzman is a 30 y.o. [id number] at 36w0d presents complaining of contractions that feel to be increasing in frequency and intensity. .   This IUP is complicated by cHTN (on labetalol 100mg BID) and h/o pre-e in a prior pregnancy, limited PNC, .  Patient reports contractions, denies vaginal bleeding, denies LOF.   Fetal Movement: normal.       She endorses a mild bilateral temple headache. She has not taken any medications for the HA, But she denies SOB, chest pain, RUQ pain, increased swelling, or any other pre-e symptoms at this time.      She also did not take her labetalol this morning, which she was given in the [facility] and her pressures remained mild range    In the [facility]INE she had a severe headache that was unrelieved by fioricet, compazine, benadryl, and ativan, so decision was made to induce for pre-e with severe features being her headache.     Review of Systems   Constitutional: Negative for fever.   HENT: Negative for sinus pain and sore throat.  Positive for headaches  Eyes: Negative for photophobia and visual disturbance.   Respiratory: Negative for chest tightness and shortness of breath.    Cardiovascular: Negative for chest pain and palpitations.   Gastrointestinal: Positive for abdominal pain (contractions). Negative for abdominal distention, nausea and vomiting.   Genitourinary: Negative for dysuria and vaginal bleeding.   Musculoskeletal: Negative for back pain.   Skin: Negative for rash.   Neurological: Negative for weakness and headaches.   Hematological: Does not bruise/bleed easily.          PMHx:   Past Medical History:   Diagnosis Date    Hypertension     Hypertension affecting pregnancy in second trimester 2020       PSHx: No past surgical history on file.    All: Review of patient's allergies indicates:  No Known Allergies    Meds: (Not in a hospital admission)      SH:   Social  History     Socioeconomic History    Marital status: Single     Spouse name: Not on file    Number of children: Not on file    Years of education: Not on file    Highest education level: Not on file   Occupational History    Not on file   Social Needs    Financial resource strain: Not on file    Food insecurity     Worry: Not on file     Inability: Not on file    Transportation needs     Medical: Not on file     Non-medical: Not on file   Tobacco Use    Smoking status: Never Smoker    Smokeless tobacco: Never Used   Substance and Sexual Activity    Alcohol use: No    Drug use: No    Sexual activity: Yes     Partners: Male     Birth control/protection: None   Lifestyle    Physical activity     Days per week: Not on file     Minutes per session: Not on file    Stress: Not on file   Relationships    Social connections     Talks on phone: Not on file     Gets together: Not on file     Attends Anglican service: Not on file     Active member of club or organization: Not on file     Attends meetings of clubs or organizations: Not on file     Relationship status: Not on file   Other Topics Concern    Not on file   Social History Narrative    Not on file       FH:   Family History   Problem Relation Age of Onset    Colon cancer Neg Hx     Eclampsia Neg Hx     Breast cancer Neg Hx     Ovarian cancer Neg Hx     Cancer Neg Hx        OBHx:   OB History    Para Term  AB Living   5 4 2 2 0 4   SAB TAB Ectopic Multiple Live Births   0 0 0 0 4      # Outcome Date GA Lbr Erick/2nd Weight Sex Delivery Anes PTL Lv   5 Current            4  19 34w5d   F Vag-Spont EPI  TANNER      Name: SLY HAWLEY      Apgar1: 8  Apgar5: 9   3  17 36w0d   M Vag-Spont EPI  TANNER      Birth Comments: induced for pre - eclampsia      Complications: Severe pre-eclampsia      Name: KEATON HAWLEY      Apgar1: 2  Apgar5: 9   2 Term 14 39w0d  2.892 kg (6 lb 6 oz) F Vag-Spont EPI N TANNER       Complications: Gestational (pregnancy-induced) hypertension without significant proteinuria, first trimester      Apgar1: 9  Apgar5: 9   1 Term 01/22/11 37w0d  3.345 kg (7 lb 6 oz) F Vag-Spont  N TANNER       Objective:       /82   Pulse 91   LMP 12/27/2019 (Exact Date)   SpO2 98%     Vitals:    09/08/20 1433 09/08/20 1501 09/08/20 1503 09/08/20 1505   BP:       Pulse: 88 95 102 91   SpO2:           General:   alert, appears stated age and cooperative, no apparent distress   HENT:  normocephalic, atraumatic   Eyes:  extraocular movements and conjunctivae normal   Neck:  supple, range of motion normal, no thyromegaly   Lungs:   no respiratory distress   Heart:   regular rate   Abdomen:  soft, non-tender, non-distended but gravid, no rebound or guarding    Extremities positive 1+ pitting edema, negative erythema   FHT: 130, moderate BTBV, +accels, -decels;  Cat 1 (reassuring)                 TOCO: irregular   Presentations: cephalic by ultrasound   Cervix:     Dilation: 3     Effacement: 50     Station:  -3     Consistency: medium    Position: middle   Sterile Speculum Exam: negative for ROM    EFW by Leopold's: 6  Maternal pelvis- adequate    Lab Review  Blood Type O POS  GBBS: unknown  Rubella: Immune  RPR: nr- 1st trim  HIV: negative - 1st trim  HepB: negative       Assessment:       36w0d weeks gestation with new onset severe headache in the setting of cHTN admitted for atypical severe pre-e    Active Hospital Problems    Diagnosis  POA    *Pre-eclampsia in third trimester [O14.93]  Unknown    Pre-eclampsia [O14.90]  Yes      Resolved Hospital Problems   No resolved problems to display.          Plan:     1. Induction of Labor  - Risks, benefits, alternatives and possible complications have been discussed in detail with the patient.   - Consents signed and to chart  - Admit to Labor and Delivery unit  - Epidural per Anesthesia  - Draw CBC, T&S, and 3rd trimester labs  - Notify Staff  - Recheck in 2-4  hrs or PRN  - Post-Partum Hemorrhage risk - low     2. cHTN with SI atypical pre-e w/ SF  - BP: (134-172)/(71-99) 134/82  - PreE labs collected. PCR 0.15, CBC and CMP wnl  - Patient had severe headache unrelieved by fioricet, compazine, ativan, and benadryl, so decision made to induce for pre-e w/SF  - Started on magnesium for seizure PPX    3. GBS unknown  - PCN for ppx  - GBS swab collected on admission and pending    Miesha De La O M.D.   OB/GYN  PGY-2

## 2020-09-08 NOTE — PROGRESS NOTES
LABOR NOTE    S:  Complaints: No.  Epidural working:  yes  HA improved, able to take small nap. Denies any  vision changes, RUQ pain, CP or SOB.       O: /81   Pulse 95   Temp 98.1 °F (36.7 °C)   Resp 18   LMP 2019 (Exact Date)   SpO2 100%   Breastfeeding No       FHT: 135 Cat 1 (reassuring)  CTX: irregular, not picking up well on toco   SVE: 3/60/-2, attempted AROM      ASSESSMENT:   30 y.o.  at 36w0d     FHT reassuring    Active Hospital Problems    Diagnosis  POA    *Pre-eclampsia in third trimester [O14.93]  Unknown    Pre-eclampsia [O14.90]  Yes      Resolved Hospital Problems   No resolved problems to display.         PLAN:  - attempt AROM again at next check, increase pitocin  - Monitor preE  - continue Magnesium for seizure ppx  - BP: (120-172)/(70-99) 131/81  - UOP adequate   Continue Close Maternal/Fetal Monitoring  Pitocin Augmentation per protocol  Recheck 2 hours or PRN    Loretta Booth M.D.  Obstetrics and Gynecology   PGY-4

## 2020-09-09 LAB
BASOPHILS # BLD AUTO: 0.04 K/UL (ref 0–0.2)
BASOPHILS NFR BLD: 0.2 % (ref 0–1.9)
DIFFERENTIAL METHOD: ABNORMAL
EOSINOPHIL # BLD AUTO: 0 K/UL (ref 0–0.5)
EOSINOPHIL NFR BLD: 0.1 % (ref 0–8)
ERYTHROCYTE [DISTWIDTH] IN BLOOD BY AUTOMATED COUNT: 13.1 % (ref 11.5–14.5)
HCT VFR BLD AUTO: 32.8 % (ref 37–48.5)
HGB BLD-MCNC: 10.5 G/DL (ref 12–16)
HIV 1+2 AB+HIV1 P24 AG SERPL QL IA: NEGATIVE
IMM GRANULOCYTES # BLD AUTO: 0.14 K/UL (ref 0–0.04)
IMM GRANULOCYTES NFR BLD AUTO: 0.8 % (ref 0–0.5)
LYMPHOCYTES # BLD AUTO: 1.3 K/UL (ref 1–4.8)
LYMPHOCYTES NFR BLD: 7.7 % (ref 18–48)
MCH RBC QN AUTO: 27.3 PG (ref 27–31)
MCHC RBC AUTO-ENTMCNC: 32 G/DL (ref 32–36)
MCV RBC AUTO: 85 FL (ref 82–98)
MONOCYTES # BLD AUTO: 1.3 K/UL (ref 0.3–1)
MONOCYTES NFR BLD: 7.8 % (ref 4–15)
NEUTROPHILS # BLD AUTO: 14.3 K/UL (ref 1.8–7.7)
NEUTROPHILS NFR BLD: 83.4 % (ref 38–73)
NRBC BLD-RTO: 0 /100 WBC
PLATELET # BLD AUTO: 214 K/UL (ref 150–350)
PMV BLD AUTO: 10.8 FL (ref 9.2–12.9)
RBC # BLD AUTO: 3.85 M/UL (ref 4–5.4)
RPR SER QL: NORMAL
WBC # BLD AUTO: 17.13 K/UL (ref 3.9–12.7)

## 2020-09-09 PROCEDURE — 36004723: Performed by: OBSTETRICS & GYNECOLOGY

## 2020-09-09 PROCEDURE — 85025 COMPLETE CBC W/AUTO DIFF WBC: CPT

## 2020-09-09 PROCEDURE — 59409 PRA ETRICAL CARE,VAG DELIV ONLY: ICD-10-PCS | Mod: AA,,, | Performed by: ANESTHESIOLOGY

## 2020-09-09 PROCEDURE — 88302 PR  SURG PATH,LEVEL II: ICD-10-PCS | Mod: 26,,, | Performed by: PATHOLOGY

## 2020-09-09 PROCEDURE — 25000003 PHARM REV CODE 250: Performed by: STUDENT IN AN ORGANIZED HEALTH CARE EDUCATION/TRAINING PROGRAM

## 2020-09-09 PROCEDURE — C1751 CATH, INF, PER/CENT/MIDLINE: HCPCS | Performed by: ANESTHESIOLOGY

## 2020-09-09 PROCEDURE — 59409 OBSTETRICAL CARE: CPT | Mod: AA,,, | Performed by: ANESTHESIOLOGY

## 2020-09-09 PROCEDURE — 71000033 HC RECOVERY, INTIAL HOUR: Performed by: OBSTETRICS & GYNECOLOGY

## 2020-09-09 PROCEDURE — 63600175 PHARM REV CODE 636 W HCPCS: Performed by: STUDENT IN AN ORGANIZED HEALTH CARE EDUCATION/TRAINING PROGRAM

## 2020-09-09 PROCEDURE — 27200710 HC EPIDURAL INFUSION PUMP SET: Performed by: ANESTHESIOLOGY

## 2020-09-09 PROCEDURE — 25000003 PHARM REV CODE 250: Performed by: ANESTHESIOLOGY

## 2020-09-09 PROCEDURE — 51702 INSERT TEMP BLADDER CATH: CPT

## 2020-09-09 PROCEDURE — 37000009 HC ANESTHESIA EA ADD 15 MINS: Mod: SZN

## 2020-09-09 PROCEDURE — 62326 NJX INTERLAMINAR LMBR/SAC: CPT | Performed by: STUDENT IN AN ORGANIZED HEALTH CARE EDUCATION/TRAINING PROGRAM

## 2020-09-09 PROCEDURE — 59409 PR OBSTETRICAL CARE,VAG DELIV ONLY: ICD-10-PCS | Mod: AT,,, | Performed by: OBSTETRICS & GYNECOLOGY

## 2020-09-09 PROCEDURE — 36004723: Mod: SZN

## 2020-09-09 PROCEDURE — 37000008 HC ANESTHESIA 1ST 15 MINUTES: Performed by: OBSTETRICS & GYNECOLOGY

## 2020-09-09 PROCEDURE — 27000181 HC CABLE, IUPC

## 2020-09-09 PROCEDURE — 36415 COLL VENOUS BLD VENIPUNCTURE: CPT

## 2020-09-09 PROCEDURE — 59409 OBSTETRICAL CARE: CPT | Mod: AT,,, | Performed by: OBSTETRICS & GYNECOLOGY

## 2020-09-09 PROCEDURE — 58605 PR LIGATE FALLOPIAN TUBE,POSTPARTUM: ICD-10-PCS | Mod: ,,, | Performed by: OBSTETRICS & GYNECOLOGY

## 2020-09-09 PROCEDURE — 71000039 HC RECOVERY, EACH ADD'L HOUR: Performed by: OBSTETRICS & GYNECOLOGY

## 2020-09-09 PROCEDURE — 11000001 HC ACUTE MED/SURG PRIVATE ROOM

## 2020-09-09 PROCEDURE — 36004722: Performed by: OBSTETRICS & GYNECOLOGY

## 2020-09-09 PROCEDURE — S0028 INJECTION, FAMOTIDINE, 20 MG: HCPCS | Performed by: ANESTHESIOLOGY

## 2020-09-09 PROCEDURE — 72100003 HC LABOR CARE, EA. ADDL. 8 HRS

## 2020-09-09 PROCEDURE — 88302 TISSUE EXAM BY PATHOLOGIST: CPT | Mod: SZN | Performed by: PATHOLOGY

## 2020-09-09 PROCEDURE — 58605 DIVISION OF FALLOPIAN TUBE: CPT | Mod: ,,, | Performed by: OBSTETRICS & GYNECOLOGY

## 2020-09-09 PROCEDURE — 37000009 HC ANESTHESIA EA ADD 15 MINS: Performed by: OBSTETRICS & GYNECOLOGY

## 2020-09-09 PROCEDURE — 88302 TISSUE EXAM BY PATHOLOGIST: CPT | Mod: 26,,, | Performed by: PATHOLOGY

## 2020-09-09 RX ORDER — SODIUM CITRATE AND CITRIC ACID MONOHYDRATE 334; 500 MG/5ML; MG/5ML
30 SOLUTION ORAL ONCE
Status: COMPLETED | OUTPATIENT
Start: 2020-09-09 | End: 2020-09-09

## 2020-09-09 RX ORDER — DOCUSATE SODIUM 100 MG/1
200 CAPSULE, LIQUID FILLED ORAL 2 TIMES DAILY PRN
Status: DISCONTINUED | OUTPATIENT
Start: 2020-09-09 | End: 2020-09-10 | Stop reason: HOSPADM

## 2020-09-09 RX ORDER — LIDOCAINE HYDROCHLORIDE AND EPINEPHRINE 20; 10 MG/ML; UG/ML
INJECTION, SOLUTION INFILTRATION; PERINEURAL
Status: DISCONTINUED | OUTPATIENT
Start: 2020-09-09 | End: 2020-09-09

## 2020-09-09 RX ORDER — LIDOCAINE HCL/EPINEPHRINE/PF 2%-1:200K
VIAL (ML) INJECTION
Status: DISCONTINUED | OUTPATIENT
Start: 2020-09-09 | End: 2020-09-09

## 2020-09-09 RX ORDER — DIPHENHYDRAMINE HYDROCHLORIDE 50 MG/ML
25 INJECTION INTRAMUSCULAR; INTRAVENOUS EVERY 4 HOURS PRN
Status: DISCONTINUED | OUTPATIENT
Start: 2020-09-09 | End: 2020-09-10 | Stop reason: HOSPADM

## 2020-09-09 RX ORDER — LIDOCAINE HYDROCHLORIDE AND EPINEPHRINE 15; 5 MG/ML; UG/ML
INJECTION, SOLUTION EPIDURAL
Status: DISCONTINUED | OUTPATIENT
Start: 2020-09-09 | End: 2020-09-09

## 2020-09-09 RX ORDER — FENTANYL CITRATE 50 UG/ML
INJECTION, SOLUTION INTRAMUSCULAR; INTRAVENOUS
Status: COMPLETED
Start: 2020-09-09 | End: 2020-09-09

## 2020-09-09 RX ORDER — LIDOCAINE HCL/EPINEPHRINE/PF 2%-1:200K
VIAL (ML) INJECTION CONTINUOUS PRN
Status: DISCONTINUED | OUTPATIENT
Start: 2020-09-09 | End: 2020-09-09

## 2020-09-09 RX ORDER — SIMETHICONE 80 MG
1 TABLET,CHEWABLE ORAL EVERY 6 HOURS PRN
Status: DISCONTINUED | OUTPATIENT
Start: 2020-09-09 | End: 2020-09-10 | Stop reason: HOSPADM

## 2020-09-09 RX ORDER — FENTANYL CITRATE 50 UG/ML
INJECTION, SOLUTION INTRAMUSCULAR; INTRAVENOUS
Status: DISCONTINUED | OUTPATIENT
Start: 2020-09-09 | End: 2020-09-09

## 2020-09-09 RX ORDER — OXYTOCIN/RINGER'S LACTATE 30/500 ML
95 PLASTIC BAG, INJECTION (ML) INTRAVENOUS ONCE
Status: DISCONTINUED | OUTPATIENT
Start: 2020-09-09 | End: 2020-09-10 | Stop reason: HOSPADM

## 2020-09-09 RX ORDER — ACETAMINOPHEN 325 MG/1
650 TABLET ORAL EVERY 6 HOURS PRN
Status: DISCONTINUED | OUTPATIENT
Start: 2020-09-09 | End: 2020-09-10 | Stop reason: HOSPADM

## 2020-09-09 RX ORDER — IBUPROFEN 600 MG/1
600 TABLET ORAL EVERY 6 HOURS
Status: DISCONTINUED | OUTPATIENT
Start: 2020-09-09 | End: 2020-09-10 | Stop reason: HOSPADM

## 2020-09-09 RX ORDER — BUPIVACAINE HYDROCHLORIDE 2.5 MG/ML
INJECTION, SOLUTION EPIDURAL; INFILTRATION; INTRACAUDAL
Status: DISPENSED
Start: 2020-09-09 | End: 2020-09-09

## 2020-09-09 RX ORDER — HYDROCORTISONE 25 MG/G
CREAM TOPICAL 3 TIMES DAILY PRN
Status: DISCONTINUED | OUTPATIENT
Start: 2020-09-09 | End: 2020-09-10 | Stop reason: HOSPADM

## 2020-09-09 RX ORDER — MISOPROSTOL 200 UG/1
200 TABLET ORAL EVERY 6 HOURS
Status: COMPLETED | OUTPATIENT
Start: 2020-09-09 | End: 2020-09-10

## 2020-09-09 RX ORDER — ENOXAPARIN SODIUM 100 MG/ML
40 INJECTION SUBCUTANEOUS EVERY 12 HOURS
Status: DISCONTINUED | OUTPATIENT
Start: 2020-09-10 | End: 2020-09-10 | Stop reason: HOSPADM

## 2020-09-09 RX ORDER — FAMOTIDINE 10 MG/ML
20 INJECTION INTRAVENOUS ONCE
Status: COMPLETED | OUTPATIENT
Start: 2020-09-09 | End: 2020-09-09

## 2020-09-09 RX ORDER — DIPHENHYDRAMINE HCL 25 MG
25 CAPSULE ORAL EVERY 4 HOURS PRN
Status: DISCONTINUED | OUTPATIENT
Start: 2020-09-09 | End: 2020-09-10 | Stop reason: HOSPADM

## 2020-09-09 RX ORDER — PRENATAL WITH FERROUS FUM AND FOLIC ACID 3080; 920; 120; 400; 22; 1.84; 3; 20; 10; 1; 12; 200; 27; 25; 2 [IU]/1; [IU]/1; MG/1; [IU]/1; MG/1; MG/1; MG/1; MG/1; MG/1; MG/1; UG/1; MG/1; MG/1; MG/1; MG/1
1 TABLET ORAL DAILY
Status: DISCONTINUED | OUTPATIENT
Start: 2020-09-09 | End: 2020-09-10 | Stop reason: HOSPADM

## 2020-09-09 RX ORDER — SODIUM CHLORIDE, SODIUM LACTATE, POTASSIUM CHLORIDE, CALCIUM CHLORIDE 600; 310; 30; 20 MG/100ML; MG/100ML; MG/100ML; MG/100ML
INJECTION, SOLUTION INTRAVENOUS CONTINUOUS PRN
Status: DISCONTINUED | OUTPATIENT
Start: 2020-09-09 | End: 2020-09-09

## 2020-09-09 RX ORDER — FENTANYL/BUPIVACAINE/NS/PF 2MCG/ML-.1
PLASTIC BAG, INJECTION (ML) INJECTION CONTINUOUS
Status: DISCONTINUED | OUTPATIENT
Start: 2020-09-09 | End: 2020-09-09

## 2020-09-09 RX ORDER — HYDROCODONE BITARTRATE AND ACETAMINOPHEN 10; 325 MG/1; MG/1
1 TABLET ORAL EVERY 4 HOURS PRN
Status: DISCONTINUED | OUTPATIENT
Start: 2020-09-09 | End: 2020-09-10 | Stop reason: HOSPADM

## 2020-09-09 RX ORDER — ONDANSETRON 8 MG/1
8 TABLET, ORALLY DISINTEGRATING ORAL EVERY 8 HOURS PRN
Status: CANCELLED | OUTPATIENT
Start: 2020-09-09

## 2020-09-09 RX ORDER — FENTANYL/BUPIVACAINE/NS/PF 2MCG/ML-.1
PLASTIC BAG, INJECTION (ML) INJECTION
Status: COMPLETED
Start: 2020-09-09 | End: 2020-09-09

## 2020-09-09 RX ORDER — OXYTOCIN/RINGER'S LACTATE 30/500 ML
PLASTIC BAG, INJECTION (ML) INTRAVENOUS CONTINUOUS PRN
Status: DISCONTINUED | OUTPATIENT
Start: 2020-09-09 | End: 2020-09-09

## 2020-09-09 RX ORDER — HYDROCODONE BITARTRATE AND ACETAMINOPHEN 5; 325 MG/1; MG/1
1 TABLET ORAL EVERY 4 HOURS PRN
Status: DISCONTINUED | OUTPATIENT
Start: 2020-09-09 | End: 2020-09-10 | Stop reason: HOSPADM

## 2020-09-09 RX ADMIN — HYDROCODONE BITARTRATE AND ACETAMINOPHEN 1 TABLET: 10; 325 TABLET ORAL at 05:09

## 2020-09-09 RX ADMIN — Medication 95 ML/HR: at 09:09

## 2020-09-09 RX ADMIN — LIDOCAINE HYDROCHLORIDE,EPINEPHRINE BITARTRATE 5 ML: 20; .01 INJECTION, SOLUTION INFILTRATION; PERINEURAL at 09:09

## 2020-09-09 RX ADMIN — PRENATAL VIT W/ FE FUMARATE-FA TAB 27-0.8 MG 1 TABLET: 27-0.8 TAB at 12:09

## 2020-09-09 RX ADMIN — Medication 5 ML: at 02:09

## 2020-09-09 RX ADMIN — FENTANYL CITRATE 100 MCG: 50 INJECTION, SOLUTION INTRAMUSCULAR; INTRAVENOUS at 02:09

## 2020-09-09 RX ADMIN — FENTANYL CITRATE 100 MCG: 50 INJECTION, SOLUTION INTRAMUSCULAR; INTRAVENOUS at 08:09

## 2020-09-09 RX ADMIN — HYDROCODONE BITARTRATE AND ACETAMINOPHEN 1 TABLET: 10; 325 TABLET ORAL at 11:09

## 2020-09-09 RX ADMIN — LIDOCAINE HYDROCHLORIDE,EPINEPHRINE BITARTRATE 5 ML: 20; .005 INJECTION, SOLUTION EPIDURAL; INFILTRATION; INTRACAUDAL; PERINEURAL at 09:09

## 2020-09-09 RX ADMIN — SIMETHICONE CHEW TAB 80 MG 80 MG: 80 TABLET ORAL at 08:09

## 2020-09-09 RX ADMIN — MISOPROSTOL 200 MCG: 200 TABLET ORAL at 02:09

## 2020-09-09 RX ADMIN — Medication 10 ML/HR: at 02:09

## 2020-09-09 RX ADMIN — SODIUM CHLORIDE, SODIUM LACTATE, POTASSIUM CHLORIDE, AND CALCIUM CHLORIDE: .6; .31; .03; .02 INJECTION, SOLUTION INTRAVENOUS at 08:09

## 2020-09-09 RX ADMIN — LABETALOL HYDROCHLORIDE 100 MG: 100 TABLET, FILM COATED ORAL at 08:09

## 2020-09-09 RX ADMIN — IBUPROFEN 600 MG: 600 TABLET, FILM COATED ORAL at 05:09

## 2020-09-09 RX ADMIN — LIDOCAINE HYDROCHLORIDE,EPINEPHRINE BITARTRATE 3 ML: 15; .005 INJECTION, SOLUTION EPIDURAL; INFILTRATION; INTRACAUDAL; PERINEURAL at 02:09

## 2020-09-09 RX ADMIN — MISOPROSTOL 200 MCG: 200 TABLET ORAL at 07:09

## 2020-09-09 RX ADMIN — DEXTROSE 3 MILLION UNITS: 50 INJECTION, SOLUTION INTRAVENOUS at 12:09

## 2020-09-09 RX ADMIN — DEXTROSE 3 MILLION UNITS: 50 INJECTION, SOLUTION INTRAVENOUS at 04:09

## 2020-09-09 RX ADMIN — FENTANYL CITRATE 100 MCG: 50 INJECTION, SOLUTION INTRAMUSCULAR; INTRAVENOUS at 09:09

## 2020-09-09 RX ADMIN — SIMETHICONE CHEW TAB 80 MG 80 MG: 80 TABLET ORAL at 01:09

## 2020-09-09 RX ADMIN — HYDROCODONE BITARTRATE AND ACETAMINOPHEN 1 TABLET: 10; 325 TABLET ORAL at 08:09

## 2020-09-09 RX ADMIN — SODIUM CITRATE AND CITRIC ACID MONOHYDRATE 30 ML: 500; 334 SOLUTION ORAL at 08:09

## 2020-09-09 RX ADMIN — IBUPROFEN 600 MG: 600 TABLET, FILM COATED ORAL at 11:09

## 2020-09-09 RX ADMIN — MAGNESIUM SULFATE IN WATER 2 G/HR: 40 INJECTION, SOLUTION INTRAVENOUS at 12:09

## 2020-09-09 RX ADMIN — MISOPROSTOL 200 MCG: 200 TABLET ORAL at 08:09

## 2020-09-09 RX ADMIN — LIDOCAINE HYDROCHLORIDE,EPINEPHRINE BITARTRATE 5 ML: 20; .01 INJECTION, SOLUTION INFILTRATION; PERINEURAL at 08:09

## 2020-09-09 RX ADMIN — SODIUM CHLORIDE, SODIUM LACTATE, POTASSIUM CHLORIDE, AND CALCIUM CHLORIDE: 600; 310; 30; 20 INJECTION, SOLUTION INTRAVENOUS at 09:09

## 2020-09-09 RX ADMIN — FAMOTIDINE 20 MG: 10 INJECTION INTRAVENOUS at 08:09

## 2020-09-09 RX ADMIN — SODIUM CHLORIDE, SODIUM LACTATE, POTASSIUM CHLORIDE, AND CALCIUM CHLORIDE: .6; .31; .03; .02 INJECTION, SOLUTION INTRAVENOUS at 12:09

## 2020-09-09 NOTE — PROGRESS NOTES
LABOR NOTE    S:  Complaints: No.  Epidural working:  N/a  HA gone. Denies any  vision changes, RUQ pain, CP or SOB.       O: BP (!) 113/55   Pulse 93   Temp 98.8 °F (37.1 °C) (Oral)   Resp 20   LMP 2019 (Exact Date)   SpO2 97%   Breastfeeding No       FHT: 125 Cat 1 (reassuring)  CTX: irregular, not picking up well on toco, when am every 4 mins  SVE: 4/80/-2, patient does tolerate cervcal exam well. Appears ruptured       ASSESSMENT:   30 y.o.  at 36w0d     FHT reassuring    Active Hospital Problems    Diagnosis  POA    *Pre-eclampsia in third trimester [O14.93]  Unknown    Pre-eclampsia [O14.90]  Yes      Resolved Hospital Problems   No resolved problems to display.         PLAN:  - increase pit  - IUPC if unchanged at next check   - Monitor preE  - continue Magnesium for seizure ppx  - BP: (113-172)/(55-99) 113/55  - UOP adequate   Continue Close Maternal/Fetal Monitoring  Pitocin Augmentation per protocol  Recheck 2 hours or PRN    Loretta Booth M.D.  Obstetrics and Gynecology   PGY-4

## 2020-09-09 NOTE — PROGRESS NOTES
LABOR NOTE    S:  Complaints: Feeling urge to push.  Epidural working:  yes  Denies HA, any  vision changes, RUQ pain, CP or SOB.     O: BP (!) 146/83   Pulse 97   Temp 98.4 °F (36.9 °C) (Oral)   Resp 20   LMP 2019 (Exact Date)   SpO2 99%   Breastfeeding No       FHT: 120, moderate,- accels, - decels Cat 1 (reassuring)  CTX: q 2-3  SVE: c/c/+2, IUPC in place     Reflexes 2+  Non-labored respirations      ASSESSMENT:   30 y.o.  at 36w0d     FHT reassuring    Active Hospital Problems    Diagnosis  POA    *Pre-eclampsia in third trimester [O14.93]  Unknown    Pre-eclampsia [O14.90]  Yes      Resolved Hospital Problems   No resolved problems to display.     Course:  1215: /-2  0200: /-2  0400: /-1  0510: /-1  0710: c/c/+2    PLAN:  - continue Magnesium for seizure ppx  - BP: (113-175)/(55-99) 146/83  - UOP adequate       Continue Close Maternal/Fetal Monitoring  Pitocin Augmentation per protocol -- pit @ 28  Set up to push, staff notified    Linda Hoang M.D.  OB/GYN PGY-2

## 2020-09-09 NOTE — PROGRESS NOTES
LABOR NOTE    S:  Complaints: No.  Epidural working:  yes  HA gone. Denies any  vision changes, RUQ pain, CP or SOB.       O: BP (!) 113/55   Pulse 93   Temp 98.8 °F (37.1 °C) (Oral)   Resp 20   LMP 2019 (Exact Date)   SpO2 97%   Breastfeeding No       FHT: 135 Cat 1 (reassuring)  CTX: irregular, not picking up well on toco   SVE: 3/80/-2      ASSESSMENT:   30 y.o.  at 36w0d     FHT reassuring    Active Hospital Problems    Diagnosis  POA    *Pre-eclampsia in third trimester [O14.93]  Unknown    Pre-eclampsia [O14.90]  Yes      Resolved Hospital Problems   No resolved problems to display.     Course  1500: 3/50/-3  1800: 3/60/-2  2030: 3/80/-2      PLAN:  - Monitor preE  - continue Magnesium for seizure ppx  - UOP adequate   Continue Close Maternal/Fetal Monitoring  Pitocin Augmentation per protocol  Recheck 2 hours or PRN    Nicole Ramos MD  OBGYN PGY-2

## 2020-09-09 NOTE — PROGRESS NOTES
LABOR NOTE    S:  Complaints: No.  Epidural working:  yes  Denies HA, any  vision changes, RUQ pain, CP or SOB.     O: BP (!) 120/58   Pulse 90   Temp 98.1 °F (36.7 °C) (Oral)   Resp 18   LMP 2019 (Exact Date)   SpO2 100%   Breastfeeding No       FHT: 120, moderate,- accels, - decels Cat 1 (reassuring)  CTX: q 2-3  SVE: , IUPC in place        ASSESSMENT:   30 y.o.  at 36w0d     FHT reassuring    Active Hospital Problems    Diagnosis  POA    *Pre-eclampsia in third trimester [O14.93]  Unknown    Pre-eclampsia [O14.90]  Yes      Resolved Hospital Problems   No resolved problems to display.     Course:  1215:   0200:   0400:   0510:     PLAN:  - continue Magnesium for seizure ppx  - BP: (113-175)/(55-99) 136/77  - UOP adequate       Continue Close Maternal/Fetal Monitoring  Pitocin Augmentation per protocol  Recheck 2 hours or PRN    Nicole Ramos MD  OBGYN PGY-2

## 2020-09-09 NOTE — ANESTHESIA PROCEDURE NOTES
Epidural    Patient location during procedure: OB   Reason for block: primary anesthetic   Diagnosis: IUP   Start time: 9/9/2020 2:10 AM  Timeout: 9/9/2020 2:10 AM  End time: 9/9/2020 2:22 AM  Surgery related to: Vaginal Delivery    Staffing  Performing Provider: Yung Webb MD  Authorizing Provider: Mitzi Ye MD        Preanesthetic Checklist  Completed: patient identified, site marked, surgical consent, pre-op evaluation, timeout performed, IV checked, risks and benefits discussed, monitors and equipment checked, anesthesia consent given, hand hygiene performed and patient being monitored  Preparation  Patient position: sitting  Prep: ChloraPrep  Patient monitoring: Pulse Ox  Epidural  Skin Anesthetic: lidocaine 1%  Skin Wheal: 3 mL  Administration type: continuous  Approach: midline  Interspace: L3-4    Injection technique: ABI saline  Needle and Epidural Catheter  Needle type: Tuohy   Needle gauge: 17  Needle length: 3.5 inches  Needle insertion depth: 7 cm  Catheter type: springwound  Catheter size: 19 G  Catheter at skin depth: 11 cm  Test dose: 3 mL of lidocaine 1.5% with Epi 1-to-200,000  Additional Documentation: incremental injection, negative aspiration for heme and CSF, no paresthesia on injection, no signs/symptoms of IV or SA injection, no significant pain on injection and no significant complaints from patient  Needle localization: anatomical landmarks  Medications:  Volume per aspiration: 5 mL  Time between aspirations: 5 minutes  Assessment  Ease of block: easy  Patient's tolerance of the procedure: comfortable throughout block and no complaintsNo inadvertent dural puncture with Tuohy.  Dural puncture performed with spinal needle.

## 2020-09-09 NOTE — PROGRESS NOTES
MD to bedside. Patient trying to lamaze and inability to continue toco. SVE 4/80/-2, unchanged, FHTs 120s, category 1. IUPC placed without difficulty. Pit @18, titrate until MVUs>200.    Loretta Booth M.D.  Obstetrics and Gynecology   PGY-4

## 2020-09-09 NOTE — PLAN OF CARE
Vital signs stable. BP assessed q15 min. Pt afebrile. Pt comfortable with epidural, no complaints of pain. Adequate urine output throughout shift. Pt denies headache, chest pain, SOB, and RUQ pain. Pt states positive fetal movement. 2+ bilateral brachioradial reflexes. Breath sounds clear and equal in all lung fields. SCDs maintained. Labor progressing appropriately. Fetus status reassuring. No questions at this time. Will continue to monitor.

## 2020-09-09 NOTE — TRANSFER OF CARE
Anesthesia Transfer of Care Note    Patient: Macie Guzman    Procedure(s) Performed: Procedure(s) (LRB):  LIGATION, FALLOPIAN TUBE, POSTPARTUM (Bilateral)    Patient location: Labor and Delivery    Anesthesia Type: epidural    Transport from OR: Transported from OR on room air with adequate spontaneous ventilation    Post pain: adequate analgesia    Post assessment: no apparent anesthetic complications    Post vital signs: stable    Level of consciousness: awake    Nausea/Vomiting: no nausea/vomiting    Complications: none    Transfer of care protocol was followed      Last vitals:   Visit Vitals  BP (!) 146/83   Pulse 97   Temp 36.9 °C (98.4 °F) (Oral)   Resp 20   LMP 12/27/2019 (Exact Date)   SpO2 99%   Breastfeeding No

## 2020-09-09 NOTE — PROGRESS NOTES
LABOR NOTE    S:  Complaints: No.  Epidural working:  N/a  Denies HA, any  vision changes, RUQ pain, CP or SOB.     O: BP (!) 175/82   Pulse 96   Temp 98.4 °F (36.9 °C) (Oral)   Resp 18   LMP 2019 (Exact Date)   SpO2 98%   Breastfeeding No       FHT: 120, moderate, + accels, - decels Cat 1 (reassuring)  CTX: q3  SVE:        ASSESSMENT:   30 y.o.  at 36w0d     FHT reassuring    Active Hospital Problems    Diagnosis  POA    *Pre-eclampsia in third trimester [O14.93]  Unknown    Pre-eclampsia [O14.90]  Yes      Resolved Hospital Problems   No resolved problems to display.     Course:  1215:   0200:     PLAN:  - continue Magnesium for seizure ppx  -BP: (113-172)/(55-99) 146/88  - UOP adequate     Pt to get epidural now  Continue Close Maternal/Fetal Monitoring  Pitocin Augmentation per protocol  Recheck 2 hours or PRN    Nicole Ramos MD  OBGYN PGY-2

## 2020-09-09 NOTE — L&D DELIVERY NOTE
[facility]  Vaginal Delivery   Operative Note    SUMMARY     Normal spontaneous vaginal delivery of live infant after one set of maternal pushes. Infant was placed on mother's abdomen for skin to skin and bulb suctioning was performed.    Infant delivered position OA over intact perineum. A tight nuchal cord x 1 was reduced immediately following delivery.    Spontaneous delivery of placenta with gentle traction applied and IV pitocin given noting initially good uterine tone. Subsequent uterine bleeding with boggy tone in the lower uterine segment was noted. A manual sweep of the lower uterine segment revealed scant trailing membranes and several small clots. Bleeding and tone were then noted to be markedly improved. The placenta was inspected and noted to be intact with no missing cotyledons.    No lacerations were noted.    Patient tolerated delivery well. Sponge, needle, and lap counted correctly x 2.    Indications: Pre-eclampsia in third trimester     Pregnancy complicated by:   Patient Active Problem List   Diagnosis    Morbid obesity with BMI of 40.0-44.9, adult    Hypertension affecting pregnancy in second trimester    Pre-eclampsia in third trimester    Pre-eclampsia     (spontaneous vaginal delivery)     Admitting GA: 36w1d    Delivery Information for Shin Guzman    Birth information:  YOB: 2019   Time of birth: 7:22 AM   Sex: male   Head Delivery Date/Time: 2020  7:22 AM   Delivery type: Vaginal, Spontaneous   Gestational Age: 36w1d    Delivery Providers    Delivering clinician: Jenn Galindo DO   Provider Role    MD Linda Wilson, RN     Karyn Morales, RN     Sheela Valles, RN             Measurements    Weight: 2608 g  Length: 48.3 cm  Head circumference: 32.4 cm  Chest circumference: 28.6 cm         Apgars    Living status: Living  Apgars:  1 min.:  5 min.:  10 min.:  15 min.:  20 min.:    Skin color:  1  1        Heart rate:  2  2       Reflex irritability:  2  2       Muscle tone:  2  2       Respiratory effort:  2  2       Total:  9  9       Apgars assigned by: OSWALDO RAMIREZ RN         Operative Delivery    Forceps attempted?: No  Vacuum extractor attempted?: No         Shoulder Dystocia    Shoulder dystocia present?: No           Presentation    Presentation: Vertex           Interventions/Resuscitation    Method: Bulb Suctioning, Tactile Stimulation       Cord    Vessels: 3 vessels  Complications: None, Nuchal  Nuchal Intervention: reduced  Delayed Cord Clamping?: Yes  Cord Blood Disposition: Sent with Baby  Gases Sent?: No  Stem Cell Collection (by MD): No       Placenta    Placenta delivery date/time: 202030  Placenta removal: Spontaneous  Placenta appearance: Intact  Placenta disposition: discarded           Labor Events:       labor: Yes     Labor Onset Date/Time:         Dilation Complete Date/Time: 2020 07:15     Start Pushing Date/Time: 2020 07:20       Start Pushing Date/Time: 2020 07:20     Rupture Date/Time: 20  175         Rupture type:          Fluid Amount:       Fluid Color: Clear      Fluid Odor:       Membrane Status: ARM (Artificial Rupture)               steroids: Partial Course     Antibiotics given for GBS: Yes     Induction: oxytocin     Indications for induction:  Severe Preeclampsia     Augmentation: amniotomy     Indications for augmentation: Ineffective Contraction Pattern     Labor complications: None     Additional complications:          Cervical ripening:                     Delivery:      Episiotomy: None     Indication for Episiotomy:       Perineal Lacerations: None Repaired:      Periurethral Laceration:   Repaired:     Labial Laceration:   Repaired:     Sulcus Laceration:   Repaired:     Vaginal Laceration:   Repaired:     Cervical Laceration:   Repaired:     Repair suture: None     Repair # of packets:       Last Value - EBL - Nursing  (mL):       Sum - EBL - Nursing (mL): 0     Last Value - EBL - Anesthesia (mL):      Calculated QBL (mL): 670      Vaginal Sweep Performed: Yes     Surgicount Correct: Yes       Other providers:       Anesthesia    Method: Epidural          Details (if applicable):  Trial of Labor      Categorization:      Priority:     Indications for :     Incision Type:       Additional  information:  Forceps:    Vacuum:    Breech:    Observed anomalies    Other (Comments):         Linda Hoang M.D.  OB/GYN PGY-2

## 2020-09-09 NOTE — PROGRESS NOTES
LABOR NOTE    S:  Complaints: No.  Epidural working:  yes  Denies HA, any  vision changes, RUQ pain, CP or SOB.     O: /77   Pulse 80   Temp 98.4 °F (36.9 °C) (Oral)   Resp 18   LMP 2019 (Exact Date)   SpO2 99%   Breastfeeding No       FHT: 120, moderate, + accels, - decels Cat 1 (reassuring)  CTX: q 2-3  SVE: , IUPC in place        ASSESSMENT:   30 y.o.  at 36w0d     FHT reassuring    Active Hospital Problems    Diagnosis  POA    *Pre-eclampsia in third trimester [O14.93]  Unknown    Pre-eclampsia [O14.90]  Yes      Resolved Hospital Problems   No resolved problems to display.     Course:  1215:   0200:   0400:     PLAN:  - continue Magnesium for seizure ppx  - BP: (113-175)/(55-99) 136/77  - UOP adequate       Continue Close Maternal/Fetal Monitoring  Pitocin Augmentation per protocol  Recheck 2 hours or PRN    Loretta Booth M.D.  Obstetrics and Gynecology   PGY-4

## 2020-09-10 VITALS
RESPIRATION RATE: 18 BRPM | TEMPERATURE: 98 F | DIASTOLIC BLOOD PRESSURE: 85 MMHG | SYSTOLIC BLOOD PRESSURE: 142 MMHG | OXYGEN SATURATION: 94 % | HEART RATE: 67 BPM | BODY MASS INDEX: 45.21 KG/M2 | WEIGHT: 231.5 LBS

## 2020-09-10 LAB — BACTERIA SPEC AEROBE CULT: NORMAL

## 2020-09-10 PROCEDURE — 25000003 PHARM REV CODE 250: Performed by: STUDENT IN AN ORGANIZED HEALTH CARE EDUCATION/TRAINING PROGRAM

## 2020-09-10 PROCEDURE — 99238 HOSP IP/OBS DSCHRG MGMT 30/<: CPT | Mod: ,,, | Performed by: OBSTETRICS & GYNECOLOGY

## 2020-09-10 PROCEDURE — 63600175 PHARM REV CODE 636 W HCPCS: Performed by: STUDENT IN AN ORGANIZED HEALTH CARE EDUCATION/TRAINING PROGRAM

## 2020-09-10 PROCEDURE — 72200005 HC VAGINAL DELIVERY LEVEL II

## 2020-09-10 PROCEDURE — 99238 PR HOSPITAL DISCHARGE DAY,<30 MIN: ICD-10-PCS | Mod: ,,, | Performed by: OBSTETRICS & GYNECOLOGY

## 2020-09-10 RX ORDER — HYDROCODONE BITARTRATE AND ACETAMINOPHEN 5; 325 MG/1; MG/1
1 TABLET ORAL EVERY 6 HOURS PRN
Qty: 15 TABLET | Refills: 0 | Status: SHIPPED | OUTPATIENT
Start: 2020-09-10 | End: 2020-09-20

## 2020-09-10 RX ORDER — IBUPROFEN 600 MG/1
600 TABLET ORAL EVERY 6 HOURS PRN
Qty: 30 TABLET | Refills: 1 | Status: SHIPPED | OUTPATIENT
Start: 2020-09-10 | End: 2020-11-20

## 2020-09-10 RX ADMIN — IBUPROFEN 600 MG: 600 TABLET, FILM COATED ORAL at 04:09

## 2020-09-10 RX ADMIN — LABETALOL HYDROCHLORIDE 100 MG: 100 TABLET, FILM COATED ORAL at 10:09

## 2020-09-10 RX ADMIN — MISOPROSTOL 200 MCG: 200 TABLET ORAL at 02:09

## 2020-09-10 RX ADMIN — HYDROCODONE BITARTRATE AND ACETAMINOPHEN 1 TABLET: 10; 325 TABLET ORAL at 04:09

## 2020-09-10 RX ADMIN — IBUPROFEN 600 MG: 600 TABLET, FILM COATED ORAL at 05:09

## 2020-09-10 RX ADMIN — DOCUSATE SODIUM 200 MG: 100 CAPSULE, LIQUID FILLED ORAL at 10:09

## 2020-09-10 RX ADMIN — ENOXAPARIN SODIUM 40 MG: 40 INJECTION SUBCUTANEOUS at 10:09

## 2020-09-10 RX ADMIN — HYDROCODONE BITARTRATE AND ACETAMINOPHEN 1 TABLET: 10; 325 TABLET ORAL at 12:09

## 2020-09-10 RX ADMIN — PRENATAL VIT W/ FE FUMARATE-FA TAB 27-0.8 MG 1 TABLET: 27-0.8 TAB at 10:09

## 2020-09-10 RX ADMIN — HYDROCODONE BITARTRATE AND ACETAMINOPHEN 1 TABLET: 10; 325 TABLET ORAL at 05:09

## 2020-09-10 RX ADMIN — IBUPROFEN 600 MG: 600 TABLET, FILM COATED ORAL at 12:09

## 2020-09-10 NOTE — PLAN OF CARE
Problem:  Fall Injury Risk  Goal: Absence of Fall, Infant Drop and Related Injury  Outcome: Ongoing, Progressing     Problem: Infection  Goal: Infection Symptom Resolution  Outcome: Ongoing, Progressing     Problem: Adult Inpatient Plan of Care  Goal: Plan of Care Review  Outcome: Ongoing, Progressing  Goal: Patient-Specific Goal (Individualization)  Outcome: Ongoing, Progressing  Goal: Absence of Hospital-Acquired Illness or Injury  Outcome: Ongoing, Progressing  Goal: Optimal Comfort and Wellbeing  Outcome: Ongoing, Progressing  Goal: Readiness for Transition of Care  Outcome: Ongoing, Progressing  Goal: Rounds/Family Conference  Outcome: Ongoing, Progressing     Problem: Bariatric Environmental Safety  Goal: Safety Maintained with Care  Outcome: Ongoing, Progressing     Problem: Hypertensive Disorders in Pregnancy  Goal: Maternal-Fetal Stabilization  Outcome: Ongoing, Progressing     Problem: Skin Injury Risk Increased  Goal: Skin Health and Integrity  Outcome: Ongoing, Progressing     Problem: Adjustment to Role Transition (Postpartum Vaginal Delivery)  Goal: Successful Maternal Role Transition  Outcome: Ongoing, Progressing     Problem: Bleeding (Postpartum Vaginal Delivery)  Goal: Hemostasis  Outcome: Ongoing, Progressing     Problem: Infection (Postpartum Vaginal Delivery)  Goal: Absence of Infection Signs/Symptoms  Outcome: Ongoing, Progressing     Problem: Pain (Postpartum Vaginal Delivery)  Goal: Acceptable Pain Control  Outcome: Ongoing, Progressing     Problem: Urinary Retention (Postpartum Vaginal Delivery)  Goal: Effective Urinary Elimination  Outcome: Ongoing, Progressing

## 2020-09-10 NOTE — ANESTHESIA POSTPROCEDURE EVALUATION
Anesthesia Post Evaluation    Patient: Macie Guzman    Procedure(s) Performed: Procedure(s) (LRB):  LIGATION, FALLOPIAN TUBE, POSTPARTUM (Bilateral)    Final Anesthesia Type: epidural    Patient location during evaluation: floor  Patient participation: Yes- Able to Participate  Level of consciousness: awake and alert  Post-procedure vital signs: reviewed and stable  Pain management: adequate  Airway patency: patent    PONV status at discharge: No PONV  Anesthetic complications: no      Cardiovascular status: hemodynamically stable, blood pressure returned to baseline and stable  Respiratory status: unassisted, spontaneous ventilation and room air  Hydration status: euvolemic  Follow-up not needed.          Vitals Value Taken Time   /77 09/10/20 0801   Temp 36.7 °C (98.1 °F) 09/10/20 0800   Pulse 94 09/10/20 0806   Resp 16 09/10/20 0800   SpO2 95 % 09/10/20 0806   Vitals shown include unvalidated device data.      Event Time   Out of Recovery 09/09/2020 12:35:00         Pain/Kayla Score: Pain Rating Prior to Med Admin: 5 (9/10/2020  5:06 AM)  Pain Rating Post Med Admin: 0 (9/10/2020  1:00 AM)

## 2020-09-10 NOTE — DISCHARGE SUMMARY
Delivery Discharge Summary  Obstetrics      Primary OB Clinician: KODI Talley MD      Admission date: 2020  Discharge date: 09/10/2020    Disposition: To home, self care    Discharge Diagnosis List:      Patient Active Problem List   Diagnosis    Morbid obesity with BMI of 40.0-44.9, adult    Hypertension affecting pregnancy in second trimester    Pre-eclampsia in third trimester    Pre-eclampsia     (spontaneous vaginal delivery)       Procedure:     Hospital Course:  Macie Guzman is a 30 y.o. now ,  PPD #1 status post Spontaneous vaginal delivery at 36w2d and POD#1 s/p BTL who was admitted for cHTN w/SI Pre-E w/SF (HA), limited PNC.     Labor course was uncomplicated and resulted in  without complications.     Patient was started on magnesium for seizure prophylaxis, and BP was well controlled prior to discharge.     Please see delivery note for further details. Her postpartum course was uncomplicated. On discharge day, patient's pain is controlled with oral pain medications. Pt is tolerating ambulation without SOB or CP, and regular diet without N/V. Reports lochia is mild. Denies any HA, vision changes, F/C, LE swelling. Denies any breast pain/soreness.    Pt in stable condition and ready for discharge. She has been instructed to start and/or continue medications and follow up with her obstetrics provider as listed below.    Pertinent studies:  CBC  Recent Labs   Lab 20  1100 20  2137 20  1936   WBC 11.08 14.57* 17.13*   HGB 10.9* 11.1* 10.5*   HCT 33.5* 34.7* 32.8*   MCV 85 85 85    215 214          Immunization History   Administered Date(s) Administered    Tdap 2014, 2017, 2020        Delivery:    Episiotomy: None   Lacerations: None   Repair suture: None   Repair # of packets:     Blood loss (ml):       Birth information:  YOB: 2019   Time of birth: 7:22 AM   Sex: male   Delivery type: Vaginal, Spontaneous    Gestational Age: 36w1d    Delivery Clinician:      Other providers:       Additional  information:  Forceps:    Vacuum:    Breech:    Observed anomalies      Living?:           APGARS  One minute Five minutes Ten minutes   Skin color:         Heart rate:         Grimace:         Muscle tone:         Breathing:         Totals: 9  9        Placenta: Delivered:       appearance      Patient Instructions:   Current Discharge Medication List      START taking these medications    Details   !! HYDROcodone-acetaminophen (NORCO) 5-325 mg per tablet Take 1 tablet by mouth every 6 (six) hours as needed.  Qty: 15 tablet, Refills: 0    Comments: Quantity prescribed more than 7 day supply? No      ibuprofen (ADVIL,MOTRIN) 600 MG tablet Take 1 tablet (600 mg total) by mouth every 6 (six) hours as needed.  Qty: 30 tablet, Refills: 1       !! - Potential duplicate medications found. Please discuss with provider.      CONTINUE these medications which have NOT CHANGED    Details   acetaminophen (TYLENOL) 500 MG tablet Take 2 tablets (1,000 mg total) by mouth every 6 (six) hours as needed.  Qty: 50 tablet, Refills: 0    Associated Diagnoses: Herpes zoster with complication      acyclovir (ZOVIRAX) 800 MG Tab Take 1 tablet (800 mg total) by mouth 5 (five) times daily. for 14 days  Qty: 70 tablet, Refills: 0    Associated Diagnoses: Herpes zoster with complication      !! HYDROcodone-acetaminophen (NORCO) 5-325 mg per tablet Take 1 tablet by mouth every 4 (four) hours as needed for Pain.  Qty: 8 tablet, Refills: 0    Comments: Quantity prescribed more than 7 day supply? No  Associated Diagnoses: Herpes zoster with complication      labetaloL (NORMODYNE) 100 MG tablet Take 1 tablet (100 mg total) by mouth 2 (two) times daily.  Qty: 60 tablet, Refills: 11      omeprazole (PRILOSEC OTC) 20 MG tablet Take 1 tablet (20 mg total) by mouth once daily.  Qty: 30 tablet, Refills: 1    Associated Diagnoses: Heartburn       !! - Potential  duplicate medications found. Please discuss with provider.          Discharge Procedure Orders   Diet Adult Regular     Other restrictions (specify):   Order Comments: Pelvic rest- nothing in the vagina for 6 weeks (no sex, douching, tampons, etc).   No driving until not taking narcotics, or until you feel as though you can safely slam on the brakes without pain  No baths for 6 weeks, only showers     Notify your health care provider if you experience any of the following:  temperature >100.4     Notify your health care provider if you experience any of the following:  persistent nausea and vomiting or diarrhea     Notify your health care provider if you experience any of the following:  severe uncontrolled pain     Notify your health care provider if you experience any of the following:  difficulty breathing or increased cough     Notify your health care provider if you experience any of the following:  severe persistent headache     Notify your health care provider if you experience any of the following:  worsening rash     Notify your health care provider if you experience any of the following:  persistent dizziness, light-headedness, or visual disturbances     Notify your health care provider if you experience any of the following:  increased confusion or weakness     Notify your health care provider if you experience any of the following:   Order Comments: Heavy vaginal bleeding saturating more than 1 pad per hour for at least 2 hours.     Activity as tolerated       Follow-up Information     [facility] [address] In 1 week.    Specialty: Obstetrics and Gynecology  Why: For blood pressure check  Contact information:  [address]  [facility] [address] [phone]  Additional information:  [facility], 1st Floor   Please park in [facility] - [facility]/ [facility]YN In 6 weeks.    Specialty: Obstetrics and Gynecology  Why:  Post-partum visit  Contact information:  [address] [place] [address] [phone]                  Miesha De La O M.D.   OB/GYN  PGY-2

## 2020-09-10 NOTE — PROGRESS NOTES
POSTPARTUM PROGRESS NOTE     Macie Guzman is a 30 y.o. female PPD #1 status post Spontaneous vaginal delivery at 36w2d and POD#1 s/p BTL in a pregnancy complicated by cHTN w/SI Pre-E w/SF (HA), limited PNC. Patient is doing well this morning. She denies nausea, vomiting, fever or chills.  Patient reports mild abdominal pain that is adequately relieved by oral pain medications. Lochia is mild to moderate  and stable. Patient is voiding without difficulty via saunders and has not yet ambulated due to saunders in place. She has passed flatus.  Patient does not plan to breast feed. S/p BTL for contraception. She desires circumcision.   Denies HA, vision changes, SOB, CP, RUQ pain, or increased swelling this am    Objective:       Temp:  [98.1 °F (36.7 °C)-98.3 °F (36.8 °C)] 98.2 °F (36.8 °C)  Pulse:  [69-96] 75  Resp:  [16-18] 16  SpO2:  [96 %-99 %] 96 %  BP: (114-164)/() 125/64    General:   alert, appears stated age and cooperative   Lungs:   Non-labored respirations    Heart:   regular rate and rhythm   Abdomen:  Soft, nondistended    Uterus:  firm located at the umblicus.    Incision: Bandage in place, clean, dry and intact   Extremities: no pedal edema noted     Lab Review  Recent Labs   Lab 09/08/20  1100 09/08/20  2137 09/09/20  1936   WBC 11.08 14.57* 17.13*   HGB 10.9* 11.1* 10.5*   HCT 33.5* 34.7* 32.8*   MCV 85 85 85    215 214        Recent Labs   Lab 09/08/20  1100 09/08/20  2137    137   K 3.6 4.1    106   CO2 21* 21*   BUN 5* 3*   CREATININE 0.6 0.6   GLU 93 97   PROT 6.8 7.1   BILITOT 0.3 0.4   ALKPHOS 136* 141*   ALT 6* 8*   AST 12 11          I/O    Intake/Output Summary (Last 24 hours) at 9/10/2020 0621  Last data filed at 9/10/2020 0500  Gross per 24 hour   Intake 1897 ml   Output 3420 ml   Net -1523 ml        Assessment:     Patient Active Problem List   Diagnosis    Morbid obesity with BMI of 40.0-44.9, adult    Hypertension affecting pregnancy in second trimester     Pre-eclampsia in third trimester    Pre-eclampsia     (spontaneous vaginal delivery)        Plan:   1. Postpartum care:  - Patient doing well. Continue routine management and advances.  - Continue PO pain meds. Pain well controlled.  - Heme: H/h  >>> 10.5  - Encourage ambulation  - Circumcision desired- consents signed and to chart  - Contraception s/p BTL  - Lactation consult PRN    2. cHTN with SI atypical pre-e w/ SF  - Patient had severe headache unrelieved by fioricet, compazine, ativan, and benadryl, so decision made to induce for pre-e w/SF  - BP: (114-164)/() 125/64  - BP well controlled on home labetalol 100mg BID  - On MgSO4 for seizure ppx x24 hrs after delivery  - Asymptomatic this AM  - Labs: Plt 214  AST/ALT   Cr 0.6  PCR 0.15  - UOP 162cc/hr    3. GBS unknown  - s/p PCN    4. DVT ppx  - meets criteria for lovenox BID while inpatient and postpartum      Dispo: As patient meets milestones, will plan to discharge PPD2.    Miesha De La O M.D.   OB/GYN  PGY-2

## 2020-09-10 NOTE — NURSING
OB notified of ptn VS and that ptn desires to be discharged at this time. MD to review chart and make a decision on discharging ptn.

## 2020-09-10 NOTE — PROGRESS NOTES
Plan of care reviewed with patient. Patient verbalized understanding.  VSS.  Pain well controlled by oral pain medication. No complaint of nausea.  Magnesium infusion maintained at 50ml/hr and LR @ 50ml/hr.  Thakkar in place with good output. Bleeding moderate. Reflexes normal.  Breath sounds clear.  Will continue to monitor. Planning to D/C mag infusion at 0700.

## 2020-09-11 ENCOUNTER — PATIENT MESSAGE (OUTPATIENT)
Dept: OBSTETRICS AND GYNECOLOGY | Facility: CLINIC | Age: 30
End: 2020-09-11

## 2020-09-11 NOTE — NURSING
Patient to be discharged at this time. EDI Novak handed over discharge to EDI Molina. Patient stable and no signs of distress.

## 2020-09-11 NOTE — OP NOTE
Operative Note  Post-Partum Bilateral Tubal Ligation    Date of Procedure: 9/9/20    Pre-Operative Diagnosis:   1.Desires permanent sterilization,   Post-Partum Day #0    Post-Operative Diagnosis: Same    Procedure: Post-Partum BTL via modified Vienna Method    Surgeon:  Aroldo Talley MD    Assistant:  MD Gabe Bauman MD    Anesthesia: epidural    EBL: minimal    Specimen: Bilateral Fallopian Tubal Segments, to pathology    Complications: None    Findings: Normal appearing fallopian tubes    Procedure in Detail:  After verifying consent, the patient was taken to the OR where she was placed in the dorsal supine position after epidural anesthesia was found to be adequate.    The abdomen was then prepped and draped in the normal, sterile, fashion and two allis clamps were placed lateral to the umbilicus and lateral traction was applied. A 6 cm skin incision was made using the scalpel superior to the umbilicus and was carried down to the underlying fascia. Allis clamps and [employer] retractors were used to provide exposure. The peritoneum was then identified, grasped with 2 hemostats entered sharply using the Metzenbaum scissors.    The uterus was then identified and the patient was airplaned to the left. Two [employer] retractors were used to expose the right cornua of the uterus, the right tube was then identified and grasped using a kelin clamp.  The tube was then followed out to the fimbriated end with kelin clamps and brought to the level of the skin incision.  The tube was grasped and elevated with the kelin clamp. 0 plain gut suture was placed tied x2 around a portion of the Fallopian tube utilizing the modified Vienna method.  Metzenbaum scissors were then used to cut above the suture both proximally and distally.  Excellent hemostasis was noted. The cut portion of the tube was sent to Pathology.  After hemostasis was verified, the tube was carefully placed back into the abdominal cavity.   The left fallopian tube was then ligated in a similar fashion.    The fascia was then closed with 0 vicryl in a running fashion. Skin was then closed with 4-0 monocryl.     Sponge, lap, and needle counts were correct x 2. The patient tolerated the procedure well and was transferred over to the recovery area in stable condition.      Laquita Seals

## 2020-09-11 NOTE — NURSING
Discharge pt at this time. Discuss AVS with pt. Pt verbalized understanding and need to get a blood pressure check on her appointment schedule next week. Pt has no questions at this time.

## 2020-09-14 ENCOUNTER — PATIENT MESSAGE (OUTPATIENT)
Dept: OBSTETRICS AND GYNECOLOGY | Facility: CLINIC | Age: 30
End: 2020-09-14

## 2020-09-14 LAB
FINAL PATHOLOGIC DIAGNOSIS: NORMAL
GROSS: NORMAL

## 2020-09-17 NOTE — PHYSICIAN QUERY
"PT Name: Macie Guzman  MR #: [id number]     OB HEMATOLOGICAL CLARIFICATION     CDS/: Shannen Patel               Contact information:  [email or web address]   This form is a permanent document in the medical record.    Query Date: September 17, 2020  By submitting this query, we are merely seeking further clarification of documentation. Please utilize your independent clinical judgment when addressing the question(s) below.      Indicators Supporting Clinical Findings Location in Medical Record   x Documentation of uterine atony with bleeding, post-partum bleeding, or hemorrhage Minimal amount of bleeding PP, controlled with pitocin Bolus and uterine massage.    Subsequent uterine bleeding with boggy tone in the lower uterine segment was noted. A manual sweep of the lower uterine segment revealed scant trailing membranes and several small clots. Bleeding and tone were then noted to be markedly improved   L&D note 9/9    Documentation of retained placenta     x Delivery type with EBL Normal spontaneous vaginal delivery of live infant   Calculated QBL (mL): 670   L&D note 9/9    H/H     x Vital signs Pulse:  [69-96] 75  Resp:  [16-18] 16  SpO2:  [96 %-99 %] 96 %  BP: (114-164)/() 125/64   PN 9/10    Medications   Treatment      Blood transfusion      Other          Provider, please specify the diagnosis or diagnoses associated with the above clinical findings:     Provider, please further specify "uterine bleeding":     [   ] Immediate post-partum hemorrhage   [ x  ] Postpartum bleeding, clinically insignificant   [   ] Inherent/integral to the clinical scenario   [   ] Other hematological diagnosis (please specify): _________________   [  ] Clinically undetermined        Please document in your progress notes daily for the duration of treatment, until resolved, and include in your discharge summary.      "

## 2020-09-29 ENCOUNTER — PATIENT MESSAGE (OUTPATIENT)
Dept: OBSTETRICS AND GYNECOLOGY | Facility: CLINIC | Age: 30
End: 2020-09-29

## 2020-11-19 ENCOUNTER — PATIENT MESSAGE (OUTPATIENT)
Dept: OBSTETRICS AND GYNECOLOGY | Facility: CLINIC | Age: 30
End: 2020-11-19

## 2020-11-19 RX ORDER — FLUCONAZOLE 150 MG/1
150 TABLET ORAL DAILY
Qty: 1 TABLET | Refills: 1 | Status: SHIPPED | OUTPATIENT
Start: 2020-11-19 | End: 2020-11-20 | Stop reason: ALTCHOICE

## 2020-11-20 ENCOUNTER — OFFICE VISIT (OUTPATIENT)
Dept: PRIMARY CARE CLINIC | Facility: CLINIC | Age: 30
End: 2020-11-20
Payer: MEDICAID

## 2020-11-20 DIAGNOSIS — M54.41 ACUTE LOW BACK PAIN WITH BILATERAL SCIATICA, UNSPECIFIED BACK PAIN LATERALITY: Primary | ICD-10-CM

## 2020-11-20 DIAGNOSIS — M54.42 ACUTE LOW BACK PAIN WITH BILATERAL SCIATICA, UNSPECIFIED BACK PAIN LATERALITY: Primary | ICD-10-CM

## 2020-11-20 PROBLEM — O14.93 PRE-ECLAMPSIA IN THIRD TRIMESTER: Status: RESOLVED | Noted: 2020-09-08 | Resolved: 2020-11-20

## 2020-11-20 PROCEDURE — 99214 OFFICE O/P EST MOD 30 MIN: CPT | Mod: 95,,, | Performed by: FAMILY MEDICINE

## 2020-11-20 PROCEDURE — 99214 PR OFFICE/OUTPT VISIT, EST, LEVL IV, 30-39 MIN: ICD-10-PCS | Mod: 95,,, | Performed by: FAMILY MEDICINE

## 2020-11-20 RX ORDER — IBUPROFEN 800 MG/1
800 TABLET ORAL 3 TIMES DAILY PRN
Qty: 30 TABLET | Refills: 0 | Status: SHIPPED | OUTPATIENT
Start: 2020-11-20 | End: 2023-06-21

## 2020-11-20 NOTE — PROGRESS NOTES
Subjective:       Patient ID: Macie Guzman is a 30 y.o. female.    Chief Complaint: No chief complaint on file.      HPI  The patient location is:  [place]  The chief complaint leading to consultation is:  Back pain    Visit type: audiovisual    Face to Face time with patient:  7 min  Fourteen minutes of total time spent on the encounter, which includes face to face time and non-face to face time preparing to see the patient (eg, review of tests), Obtaining and/or reviewing separately obtained history, Documenting clinical information in the electronic or other health record, Independently interpreting results (not separately reported) and communicating results to the patient/family/caregiver, or Care coordination (not separately reported).         Each patient to whom he or she provides medical services by telemedicine is:  (1) informed of the relationship between the physician and patient and the respective role of any other health care provider with respect to management of the patient; and (2) notified that he or she may decline to receive medical services by telemedicine and may withdraw from such care at any time.    Notes:  30-year-old female with no significant past medical history who is 2 months postpartum complains of bilateral lower back and hip pain radiating down to her feet for the past several days.  No recent fall or injury.  No prior history of back problems.  Does report that her legs have felt numb and tingly at times, but no weakness.  No bowel or bladder incontinence.  No dysuria or hematuria.  No rashes or open sores.  Has not taken anything for this pain.  Review of Systems   Constitutional: Negative for chills and fever.   Respiratory: Negative for shortness of breath.    Cardiovascular: Negative for chest pain.   Gastrointestinal: Negative for abdominal pain, diarrhea and vomiting.   Genitourinary: Negative for difficulty urinating and dysuria.   Musculoskeletal: Positive for back  pain.   Skin: Negative for rash.   Allergic/Immunologic: Negative for immunocompromised state.   Neurological: Positive for numbness. Negative for weakness.   Hematological: Does not bruise/bleed easily.   Psychiatric/Behavioral: Negative for agitation.       Objective:      There were no vitals filed for this visit.  Physical Exam  Constitutional:       Appearance: She is well-developed.   Pulmonary:      Effort: No respiratory distress.   Neurological:      Mental Status: She is alert and oriented to person, place, and time.   Psychiatric:         Behavior: Behavior normal.         Lab Results   Component Value Date    WBC 17.13 (H) 09/09/2020    HGB 10.5 (L) 09/09/2020    HCT 32.8 (L) 09/09/2020     09/09/2020    ALT 8 (L) 09/08/2020    AST 11 09/08/2020     09/08/2020    K 4.1 09/08/2020     09/08/2020    CREATININE 0.6 09/08/2020    BUN 3 (L) 09/08/2020    CO2 21 (L) 09/08/2020    TSH 1.175 04/30/2019    HGBA1C 5.2 04/30/2019      Assessment:       1. Acute low back pain with bilateral sciatica, unspecified back pain laterality        Plan:       Acute low back pain with bilateral sciatica, unspecified back pain laterality  -     ibuprofen (ADVIL,MOTRIN) 800 MG tablet; Take 1 tablet (800 mg total) by mouth 3 (three) times daily as needed for Pain.  Dispense: 30 tablet; Refill: 0  Advised patient to follow-up with a primary care provider in person if symptoms persist or worsen    Medication List with Changes/Refills   New Medications    IBUPROFEN (ADVIL,MOTRIN) 800 MG TABLET    Take 1 tablet (800 mg total) by mouth 3 (three) times daily as needed for Pain.   Discontinued Medications    ACETAMINOPHEN (TYLENOL) 500 MG TABLET    Take 2 tablets (1,000 mg total) by mouth every 6 (six) hours as needed.    ACYCLOVIR (ZOVIRAX) 800 MG TAB    Take 1 tablet (800 mg total) by mouth 5 (five) times daily. for 14 days    FLUCONAZOLE (DIFLUCAN) 150 MG TAB    Take 1 tablet (150 mg total) by mouth once daily.  for 1 day    HYDROCODONE-ACETAMINOPHEN (NORCO) 5-325 MG PER TABLET    Take 1 tablet by mouth every 4 (four) hours as needed for Pain.    IBUPROFEN (ADVIL,MOTRIN) 600 MG TABLET    Take 1 tablet (600 mg total) by mouth every 6 (six) hours as needed.    LABETALOL (NORMODYNE) 100 MG TABLET    Take 1 tablet (100 mg total) by mouth 2 (two) times daily.    OMEPRAZOLE (PRILOSEC OTC) 20 MG TABLET    Take 1 tablet (20 mg total) by mouth once daily.

## 2020-11-23 ENCOUNTER — PATIENT MESSAGE (OUTPATIENT)
Dept: OBSTETRICS AND GYNECOLOGY | Facility: CLINIC | Age: 30
End: 2020-11-23

## 2020-11-23 NOTE — TELEPHONE ENCOUNTER
I Called patient and she wants to know if her tubes just got tied or if they got cut. Please advise

## 2020-12-03 ENCOUNTER — PATIENT MESSAGE (OUTPATIENT)
Dept: OBSTETRICS AND GYNECOLOGY | Facility: CLINIC | Age: 30
End: 2020-12-03

## 2021-01-11 ENCOUNTER — PATIENT MESSAGE (OUTPATIENT)
Dept: OBSTETRICS AND GYNECOLOGY | Facility: CLINIC | Age: 31
End: 2021-01-11

## 2021-01-11 DIAGNOSIS — B96.89 BV (BACTERIAL VAGINOSIS): Primary | ICD-10-CM

## 2021-01-11 DIAGNOSIS — N76.0 BV (BACTERIAL VAGINOSIS): Primary | ICD-10-CM

## 2021-01-11 RX ORDER — METRONIDAZOLE 500 MG/1
500 TABLET ORAL EVERY 12 HOURS
Qty: 14 TABLET | Refills: 0 | Status: SHIPPED | OUTPATIENT
Start: 2021-01-11 | End: 2021-01-18

## 2021-03-19 ENCOUNTER — PATIENT MESSAGE (OUTPATIENT)
Dept: OBSTETRICS AND GYNECOLOGY | Facility: CLINIC | Age: 31
End: 2021-03-19

## 2021-04-13 ENCOUNTER — PATIENT MESSAGE (OUTPATIENT)
Dept: OBSTETRICS AND GYNECOLOGY | Facility: CLINIC | Age: 31
End: 2021-04-13

## 2021-04-20 ENCOUNTER — PATIENT MESSAGE (OUTPATIENT)
Dept: OBSTETRICS AND GYNECOLOGY | Facility: CLINIC | Age: 31
End: 2021-04-20

## 2021-04-20 ENCOUNTER — HOSPITAL ENCOUNTER (EMERGENCY)
Facility: OTHER | Age: 31
Discharge: HOME | End: 2021-04-20
Attending: EMERGENCY MEDICINE
Payer: MEDICAID

## 2021-04-20 VITALS
HEART RATE: 67 BPM | BODY MASS INDEX: 40.59 KG/M2 | TEMPERATURE: 98 F | DIASTOLIC BLOOD PRESSURE: 82 MMHG | RESPIRATION RATE: 20 BRPM | OXYGEN SATURATION: 100 % | HEIGHT: 61 IN | WEIGHT: 215 LBS | SYSTOLIC BLOOD PRESSURE: 141 MMHG

## 2021-04-20 DIAGNOSIS — R10.2 PELVIC PAIN: Primary | ICD-10-CM

## 2021-04-20 DIAGNOSIS — A59.9 TRICHIMONIASIS: ICD-10-CM

## 2021-04-20 DIAGNOSIS — N30.90 CYSTITIS: ICD-10-CM

## 2021-04-20 LAB
ANION GAP SERPL CALC-SCNC: 9 MMOL/L (ref 8–16)
B-HCG UR QL: NEGATIVE
BACTERIA #/AREA URNS HPF: ABNORMAL /HPF
BACTERIA GENITAL QL WET PREP: ABNORMAL
BASOPHILS # BLD AUTO: 0.04 K/UL (ref 0–0.2)
BASOPHILS NFR BLD: 0.6 % (ref 0–1.9)
BILIRUB UR QL STRIP: NEGATIVE
BUN SERPL-MCNC: 12 MG/DL (ref 6–20)
CALCIUM SERPL-MCNC: 9.1 MG/DL (ref 8.7–10.5)
CHLORIDE SERPL-SCNC: 103 MMOL/L (ref 95–110)
CLARITY UR: CLEAR
CLUE CELLS VAG QL WET PREP: ABNORMAL
CO2 SERPL-SCNC: 27 MMOL/L (ref 23–29)
COLOR UR: YELLOW
CREAT SERPL-MCNC: 0.7 MG/DL (ref 0.5–1.4)
CTP QC/QA: YES
DIFFERENTIAL METHOD: NORMAL
EOSINOPHIL # BLD AUTO: 0.1 K/UL (ref 0–0.5)
EOSINOPHIL NFR BLD: 2.2 % (ref 0–8)
ERYTHROCYTE [DISTWIDTH] IN BLOOD BY AUTOMATED COUNT: 12.4 % (ref 11.5–14.5)
EST. GFR  (AFRICAN AMERICAN): >60 ML/MIN/1.73 M^2
EST. GFR  (NON AFRICAN AMERICAN): >60 ML/MIN/1.73 M^2
FILAMENT FUNGI VAG WET PREP-#/AREA: ABNORMAL
GLUCOSE SERPL-MCNC: 102 MG/DL (ref 70–110)
GLUCOSE UR QL STRIP: NEGATIVE
HCG INTACT+B SERPL-ACNC: <1.2 MIU/ML
HCT VFR BLD AUTO: 42 % (ref 37–48.5)
HGB BLD-MCNC: 13.6 G/DL (ref 12–16)
HGB UR QL STRIP: ABNORMAL
HYALINE CASTS #/AREA URNS LPF: 0 /LPF
IMM GRANULOCYTES # BLD AUTO: 0.02 K/UL (ref 0–0.04)
IMM GRANULOCYTES NFR BLD AUTO: 0.3 % (ref 0–0.5)
KETONES UR QL STRIP: NEGATIVE
LEUKOCYTE ESTERASE UR QL STRIP: ABNORMAL
LYMPHOCYTES # BLD AUTO: 1.8 K/UL (ref 1–4.8)
LYMPHOCYTES NFR BLD: 27.3 % (ref 18–48)
MCH RBC QN AUTO: 28.5 PG (ref 27–31)
MCHC RBC AUTO-ENTMCNC: 32.4 G/DL (ref 32–36)
MCV RBC AUTO: 88 FL (ref 82–98)
MICROSCOPIC COMMENT: ABNORMAL
MONOCYTES # BLD AUTO: 0.5 K/UL (ref 0.3–1)
MONOCYTES NFR BLD: 7.1 % (ref 4–15)
NEUTROPHILS # BLD AUTO: 4.1 K/UL (ref 1.8–7.7)
NEUTROPHILS NFR BLD: 62.5 % (ref 38–73)
NITRITE UR QL STRIP: NEGATIVE
NRBC BLD-RTO: 0 /100 WBC
PH UR STRIP: 6 [PH] (ref 5–8)
PLATELET # BLD AUTO: 270 K/UL (ref 150–450)
PMV BLD AUTO: 10.2 FL (ref 9.2–12.9)
POTASSIUM SERPL-SCNC: 4 MMOL/L (ref 3.5–5.1)
PROT UR QL STRIP: ABNORMAL
RBC # BLD AUTO: 4.78 M/UL (ref 4–5.4)
RBC #/AREA URNS HPF: 2 /HPF (ref 0–4)
SODIUM SERPL-SCNC: 139 MMOL/L (ref 136–145)
SP GR UR STRIP: 1.02 (ref 1–1.03)
SPECIMEN SOURCE: ABNORMAL
SQUAMOUS #/AREA URNS HPF: 14 /HPF
T VAGINALIS GENITAL QL WET PREP: ABNORMAL
TRICHOMONAS UR QL MICRO: ABNORMAL
URN SPEC COLLECT METH UR: ABNORMAL
UROBILINOGEN UR STRIP-ACNC: NEGATIVE EU/DL
WBC # BLD AUTO: 6.51 K/UL (ref 3.9–12.7)
WBC #/AREA URNS HPF: 12 /HPF (ref 0–5)
WBC #/AREA VAG WET PREP: ABNORMAL
YEAST GENITAL QL WET PREP: ABNORMAL

## 2021-04-20 PROCEDURE — 87491 CHLMYD TRACH DNA AMP PROBE: CPT | Performed by: PHYSICIAN ASSISTANT

## 2021-04-20 PROCEDURE — 81025 URINE PREGNANCY TEST: CPT | Performed by: EMERGENCY MEDICINE

## 2021-04-20 PROCEDURE — 96372 THER/PROPH/DIAG INJ SC/IM: CPT

## 2021-04-20 PROCEDURE — 81000 URINALYSIS NONAUTO W/SCOPE: CPT | Performed by: EMERGENCY MEDICINE

## 2021-04-20 PROCEDURE — 87210 SMEAR WET MOUNT SALINE/INK: CPT | Performed by: PHYSICIAN ASSISTANT

## 2021-04-20 PROCEDURE — 87591 N.GONORRHOEAE DNA AMP PROB: CPT | Performed by: PHYSICIAN ASSISTANT

## 2021-04-20 PROCEDURE — 87086 URINE CULTURE/COLONY COUNT: CPT | Performed by: EMERGENCY MEDICINE

## 2021-04-20 PROCEDURE — 80048 BASIC METABOLIC PNL TOTAL CA: CPT | Performed by: PHYSICIAN ASSISTANT

## 2021-04-20 PROCEDURE — 25000003 PHARM REV CODE 250: Performed by: PHYSICIAN ASSISTANT

## 2021-04-20 PROCEDURE — 84702 CHORIONIC GONADOTROPIN TEST: CPT | Performed by: PHYSICIAN ASSISTANT

## 2021-04-20 PROCEDURE — 85025 COMPLETE CBC W/AUTO DIFF WBC: CPT | Performed by: PHYSICIAN ASSISTANT

## 2021-04-20 PROCEDURE — 63600175 PHARM REV CODE 636 W HCPCS: Performed by: PHYSICIAN ASSISTANT

## 2021-04-20 PROCEDURE — 99285 EMERGENCY DEPT VISIT HI MDM: CPT | Mod: 25

## 2021-04-20 RX ORDER — METRONIDAZOLE 500 MG/1
500 TABLET ORAL EVERY 12 HOURS
Qty: 14 TABLET | Refills: 0 | Status: SHIPPED | OUTPATIENT
Start: 2021-04-20 | End: 2021-04-27

## 2021-04-20 RX ORDER — ACETAMINOPHEN 500 MG
1000 TABLET ORAL
Status: COMPLETED | OUTPATIENT
Start: 2021-04-20 | End: 2021-04-20

## 2021-04-20 RX ORDER — CEFTRIAXONE 500 MG/1
500 INJECTION, POWDER, FOR SOLUTION INTRAMUSCULAR; INTRAVENOUS
Status: COMPLETED | OUTPATIENT
Start: 2021-04-20 | End: 2021-04-20

## 2021-04-20 RX ORDER — DOXYCYCLINE 100 MG/1
100 CAPSULE ORAL 2 TIMES DAILY
Qty: 28 CAPSULE | Refills: 0 | Status: SHIPPED | OUTPATIENT
Start: 2021-04-20 | End: 2021-05-04

## 2021-04-20 RX ORDER — METRONIDAZOLE 500 MG/1
500 TABLET ORAL
Status: COMPLETED | OUTPATIENT
Start: 2021-04-20 | End: 2021-04-20

## 2021-04-20 RX ORDER — ONDANSETRON 4 MG/1
4 TABLET, ORALLY DISINTEGRATING ORAL EVERY 6 HOURS PRN
Qty: 10 TABLET | Refills: 0 | Status: SHIPPED | OUTPATIENT
Start: 2021-04-20 | End: 2022-04-01 | Stop reason: ALTCHOICE

## 2021-04-20 RX ADMIN — METRONIDAZOLE 500 MG: 500 TABLET ORAL at 01:04

## 2021-04-20 RX ADMIN — CEFTRIAXONE SODIUM 500 MG: 500 INJECTION, POWDER, FOR SOLUTION INTRAMUSCULAR; INTRAVENOUS at 01:04

## 2021-04-20 RX ADMIN — ACETAMINOPHEN 1000 MG: 500 TABLET, FILM COATED ORAL at 12:04

## 2021-04-21 LAB
C TRACH DNA SPEC QL NAA+PROBE: NOT DETECTED
N GONORRHOEA DNA SPEC QL NAA+PROBE: NOT DETECTED

## 2021-04-22 LAB — BACTERIA UR CULT: NO GROWTH

## 2021-04-26 ENCOUNTER — PATIENT MESSAGE (OUTPATIENT)
Dept: RESEARCH | Facility: HOSPITAL | Age: 31
End: 2021-04-26

## 2021-07-13 ENCOUNTER — PATIENT MESSAGE (OUTPATIENT)
Dept: OBSTETRICS AND GYNECOLOGY | Facility: CLINIC | Age: 31
End: 2021-07-13

## 2021-08-23 ENCOUNTER — HOSPITAL ENCOUNTER (INPATIENT)
Facility: HOSPITAL | Age: 31
LOS: 1 days | Discharge: HOME | DRG: 062 | End: 2021-08-24
Attending: EMERGENCY MEDICINE | Admitting: PSYCHIATRY & NEUROLOGY
Payer: MEDICAID

## 2021-08-23 DIAGNOSIS — R53.1 RIGHT SIDED WEAKNESS: Primary | ICD-10-CM

## 2021-08-23 DIAGNOSIS — I63.9 ISCHEMIC STROKE: ICD-10-CM

## 2021-08-23 DIAGNOSIS — I63.9 CVA (CEREBRAL VASCULAR ACCIDENT): ICD-10-CM

## 2021-08-23 DIAGNOSIS — R20.2 NUMBNESS AND TINGLING: ICD-10-CM

## 2021-08-23 DIAGNOSIS — R20.0 NUMBNESS AND TINGLING: ICD-10-CM

## 2021-08-23 DIAGNOSIS — I63.512 ACUTE ISCHEMIC LEFT MCA STROKE: ICD-10-CM

## 2021-08-23 PROBLEM — R07.9 CHEST PAIN: Status: ACTIVE | Noted: 2021-08-23

## 2021-08-23 PROBLEM — Z92.82 RECEIVED TISSUE PLASMINOGEN ACTIVATOR (T-PA) LESS THAN 24 HOURS PRIOR TO ARRIVAL: Status: ACTIVE | Noted: 2021-08-23

## 2021-08-23 PROBLEM — Z92.82 RECEIVED TISSUE PLASMINOGEN ACTIVATOR (T-PA) LESS THAN 24 HOURS PRIOR TO ARRIVAL: Status: RESOLVED | Noted: 2021-08-23 | Resolved: 2021-08-23

## 2021-08-23 PROBLEM — Z92.82 S/P ADMN TPA IN DIFF FAC W/N LAST 24 HR BEF ADM TO CRNT FAC: Status: ACTIVE | Noted: 2021-08-23

## 2021-08-23 LAB
ANION GAP SERPL CALC-SCNC: 11 MMOL/L (ref 8–16)
APTT BLDCRRT: 30.3 SEC (ref 21–32)
ASCENDING AORTA: 3.3 CM
AV INDEX (PROSTH): 0.95
AV MEAN GRADIENT: 3 MMHG
AV PEAK GRADIENT: 5 MMHG
AV VALVE AREA: 3.39 CM2
AV VELOCITY RATIO: 0.68
B-HCG UR QL: NEGATIVE
BASOPHILS # BLD AUTO: 0.04 K/UL (ref 0–0.2)
BASOPHILS NFR BLD: 0.5 % (ref 0–1.9)
BILIRUB UR QL STRIP: NEGATIVE
BSA FOR ECHO PROCEDURE: 1.98 M2
BUN SERPL-MCNC: 17 MG/DL (ref 6–20)
CALCIUM SERPL-MCNC: 9.1 MG/DL (ref 8.7–10.5)
CHLORIDE SERPL-SCNC: 104 MMOL/L (ref 95–110)
CHOLEST SERPL-MCNC: 181 MG/DL (ref 120–199)
CHOLEST/HDLC SERPL: 2.8 {RATIO} (ref 2–5)
CLARITY UR: CLEAR
CO2 SERPL-SCNC: 23 MMOL/L (ref 23–29)
COLOR UR: YELLOW
CREAT SERPL-MCNC: 0.7 MG/DL (ref 0.5–1.4)
CTP QC/QA: YES
CTP QC/QA: YES
CV ECHO LV RWT: 0.43 CM
DIFFERENTIAL METHOD: NORMAL
DOP CALC AO PEAK VEL: 1.17 M/S
DOP CALC AO VTI: 19.01 CM
DOP CALC LVOT AREA: 3.6 CM2
DOP CALC LVOT DIAMETER: 2.13 CM
DOP CALC LVOT PEAK VEL: 0.8 M/S
DOP CALC LVOT STROKE VOLUME: 64.39 CM3
DOP CALCLVOT PEAK VEL VTI: 18.08 CM
E WAVE DECELERATION TIME: 231.39 MSEC
E/A RATIO: 0.85
E/E' RATIO: 8.11 M/S
ECHO LV POSTERIOR WALL: 0.88 CM (ref 0.6–1.1)
EJECTION FRACTION: 60 %
EOSINOPHIL # BLD AUTO: 0.2 K/UL (ref 0–0.5)
EOSINOPHIL NFR BLD: 2.2 % (ref 0–8)
ERYTHROCYTE [DISTWIDTH] IN BLOOD BY AUTOMATED COUNT: 12.8 % (ref 11.5–14.5)
EST. GFR  (AFRICAN AMERICAN): >60 ML/MIN/1.73 M^2
EST. GFR  (NON AFRICAN AMERICAN): >60 ML/MIN/1.73 M^2
ESTIMATED AVG GLUCOSE: 103 MG/DL (ref 68–131)
FRACTIONAL SHORTENING: 28 % (ref 28–44)
GLUCOSE SERPL-MCNC: 102 MG/DL (ref 70–110)
GLUCOSE UR QL STRIP: NEGATIVE
HBA1C MFR BLD: 5.2 % (ref 4–5.6)
HCT VFR BLD AUTO: 38.8 % (ref 37–48.5)
HDLC SERPL-MCNC: 65 MG/DL (ref 40–75)
HDLC SERPL: 35.9 % (ref 20–50)
HGB BLD-MCNC: 12.8 G/DL (ref 12–16)
HGB UR QL STRIP: ABNORMAL
IMM GRANULOCYTES # BLD AUTO: 0.02 K/UL (ref 0–0.04)
IMM GRANULOCYTES NFR BLD AUTO: 0.3 % (ref 0–0.5)
INR PPP: 1 (ref 0.8–1.2)
INTERVENTRICULAR SEPTUM: 1.1 CM (ref 0.6–1.1)
KETONES UR QL STRIP: NEGATIVE
LA MAJOR: 5.38 CM
LA MINOR: 5.78 CM
LA WIDTH: 4 CM
LDLC SERPL CALC-MCNC: 104 MG/DL (ref 63–159)
LEFT ATRIUM SIZE: 4.06 CM
LEFT ATRIUM VOLUME INDEX: 40.7 ML/M2
LEFT ATRIUM VOLUME: 76.93 CM3
LEFT INTERNAL DIMENSION IN SYSTOLE: 2.97 CM (ref 2.1–4)
LEFT VENTRICLE DIASTOLIC VOLUME INDEX: 42.69 ML/M2
LEFT VENTRICLE DIASTOLIC VOLUME: 80.69 ML
LEFT VENTRICLE MASS INDEX: 69 G/M2
LEFT VENTRICLE SYSTOLIC VOLUME INDEX: 18.1 ML/M2
LEFT VENTRICLE SYSTOLIC VOLUME: 34.24 ML
LEFT VENTRICULAR INTERNAL DIMENSION IN DIASTOLE: 4.1 CM (ref 3.5–6)
LEFT VENTRICULAR MASS: 130.25 G
LEUKOCYTE ESTERASE UR QL STRIP: NEGATIVE
LV LATERAL E/E' RATIO: 7.7 M/S
LV SEPTAL E/E' RATIO: 8.56 M/S
LYMPHOCYTES # BLD AUTO: 2.1 K/UL (ref 1–4.8)
LYMPHOCYTES NFR BLD: 28.3 % (ref 18–48)
MAGNESIUM SERPL-MCNC: 1.8 MG/DL (ref 1.6–2.6)
MCH RBC QN AUTO: 28.8 PG (ref 27–31)
MCHC RBC AUTO-ENTMCNC: 33 G/DL (ref 32–36)
MCV RBC AUTO: 87 FL (ref 82–98)
MONOCYTES # BLD AUTO: 0.5 K/UL (ref 0.3–1)
MONOCYTES NFR BLD: 7.1 % (ref 4–15)
MV PEAK A VEL: 0.91 M/S
MV PEAK E VEL: 0.77 M/S
MV STENOSIS PRESSURE HALF TIME: 67.1 MS
MV VALVE AREA P 1/2 METHOD: 3.28 CM2
NEUTROPHILS # BLD AUTO: 4.7 K/UL (ref 1.8–7.7)
NEUTROPHILS NFR BLD: 61.6 % (ref 38–73)
NITRITE UR QL STRIP: NEGATIVE
NONHDLC SERPL-MCNC: 116 MG/DL
NRBC BLD-RTO: 0 /100 WBC
PH UR STRIP: 7 [PH] (ref 5–8)
PISA TR MAX VEL: 2.41 M/S
PLATELET # BLD AUTO: 271 K/UL (ref 150–450)
PMV BLD AUTO: 9.6 FL (ref 9.2–12.9)
POCT GLUCOSE: 106 MG/DL (ref 70–110)
POCT GLUCOSE: 111 MG/DL (ref 70–110)
POCT GLUCOSE: 75 MG/DL (ref 70–110)
POTASSIUM SERPL-SCNC: 3.7 MMOL/L (ref 3.5–5.1)
PROT UR QL STRIP: NEGATIVE
PROTHROMBIN TIME: 11.3 SEC (ref 9–12.5)
RA MAJOR: 5.14 CM
RA PRESSURE: 3 MMHG
RA WIDTH: 4.35 CM
RBC # BLD AUTO: 4.45 M/UL (ref 4–5.4)
RIGHT VENTRICULAR END-DIASTOLIC DIMENSION: 3.33 CM
SARS-COV-2 RDRP RESP QL NAA+PROBE: NEGATIVE
SINUS: 3.03 CM
SODIUM SERPL-SCNC: 138 MMOL/L (ref 136–145)
SP GR UR STRIP: <=1.005 (ref 1–1.03)
STJ: 3.21 CM
TDI LATERAL: 0.1 M/S
TDI SEPTAL: 0.09 M/S
TDI: 0.1 M/S
TR MAX PG: 23 MMHG
TRICUSPID ANNULAR PLANE SYSTOLIC EXCURSION: 3.05 CM
TRIGL SERPL-MCNC: 60 MG/DL (ref 30–150)
TROPONIN I SERPL DL<=0.01 NG/ML-MCNC: <0.006 NG/ML (ref 0–0.03)
TSH SERPL DL<=0.005 MIU/L-ACNC: 1.5 UIU/ML (ref 0.4–4)
TV REST PULMONARY ARTERY PRESSURE: 26 MMHG
URN SPEC COLLECT METH UR: ABNORMAL
UROBILINOGEN UR STRIP-ACNC: NEGATIVE EU/DL
WBC # BLD AUTO: 7.56 K/UL (ref 3.9–12.7)

## 2021-08-23 PROCEDURE — 82962 GLUCOSE BLOOD TEST: CPT

## 2021-08-23 PROCEDURE — 81025 URINE PREGNANCY TEST: CPT | Performed by: EMERGENCY MEDICINE

## 2021-08-23 PROCEDURE — 93005 ELECTROCARDIOGRAM TRACING: CPT

## 2021-08-23 PROCEDURE — 25000003 PHARM REV CODE 250: Performed by: STUDENT IN AN ORGANIZED HEALTH CARE EDUCATION/TRAINING PROGRAM

## 2021-08-23 PROCEDURE — 25000003 PHARM REV CODE 250: Performed by: EMERGENCY MEDICINE

## 2021-08-23 PROCEDURE — 99205 OFFICE O/P NEW HI 60 MIN: CPT | Mod: ,,, | Performed by: STUDENT IN AN ORGANIZED HEALTH CARE EDUCATION/TRAINING PROGRAM

## 2021-08-23 PROCEDURE — 63600175 PHARM REV CODE 636 W HCPCS: Mod: JG | Performed by: EMERGENCY MEDICINE

## 2021-08-23 PROCEDURE — 99223 PR INITIAL HOSPITAL CARE,LEVL III: ICD-10-PCS | Mod: ,,, | Performed by: PSYCHIATRY & NEUROLOGY

## 2021-08-23 PROCEDURE — 25000003 PHARM REV CODE 250: Performed by: PSYCHIATRY & NEUROLOGY

## 2021-08-23 PROCEDURE — 84484 ASSAY OF TROPONIN QUANT: CPT

## 2021-08-23 PROCEDURE — 25000003 PHARM REV CODE 250: Performed by: NURSE PRACTITIONER

## 2021-08-23 PROCEDURE — 83036 HEMOGLOBIN GLYCOSYLATED A1C: CPT

## 2021-08-23 PROCEDURE — 20000000 HC ICU ROOM

## 2021-08-23 PROCEDURE — 81003 URINALYSIS AUTO W/O SCOPE: CPT | Performed by: EMERGENCY MEDICINE

## 2021-08-23 PROCEDURE — 25500020 PHARM REV CODE 255: Performed by: EMERGENCY MEDICINE

## 2021-08-23 PROCEDURE — 92523 SPEECH SOUND LANG COMPREHEN: CPT

## 2021-08-23 PROCEDURE — 85730 THROMBOPLASTIN TIME PARTIAL: CPT

## 2021-08-23 PROCEDURE — 85025 COMPLETE CBC W/AUTO DIFF WBC: CPT | Performed by: EMERGENCY MEDICINE

## 2021-08-23 PROCEDURE — 92610 EVALUATE SWALLOWING FUNCTION: CPT

## 2021-08-23 PROCEDURE — 85610 PROTHROMBIN TIME: CPT

## 2021-08-23 PROCEDURE — U0002 COVID-19 LAB TEST NON-CDC: HCPCS | Performed by: EMERGENCY MEDICINE

## 2021-08-23 PROCEDURE — 99223 PR INITIAL HOSPITAL CARE,LEVL III: ICD-10-PCS | Mod: ,,,

## 2021-08-23 PROCEDURE — 99223 1ST HOSP IP/OBS HIGH 75: CPT | Mod: ,,,

## 2021-08-23 PROCEDURE — 80061 LIPID PANEL: CPT

## 2021-08-23 PROCEDURE — 97165 OT EVAL LOW COMPLEX 30 MIN: CPT

## 2021-08-23 PROCEDURE — 99223 1ST HOSP IP/OBS HIGH 75: CPT | Mod: ,,, | Performed by: PSYCHIATRY & NEUROLOGY

## 2021-08-23 PROCEDURE — 94761 N-INVAS EAR/PLS OXIMETRY MLT: CPT

## 2021-08-23 PROCEDURE — 80048 BASIC METABOLIC PNL TOTAL CA: CPT | Performed by: EMERGENCY MEDICINE

## 2021-08-23 PROCEDURE — 99285 EMERGENCY DEPT VISIT HI MDM: CPT | Mod: 25

## 2021-08-23 PROCEDURE — 96376 TX/PRO/DX INJ SAME DRUG ADON: CPT

## 2021-08-23 PROCEDURE — 25000003 PHARM REV CODE 250

## 2021-08-23 PROCEDURE — 96374 THER/PROPH/DIAG INJ IV PUSH: CPT

## 2021-08-23 PROCEDURE — 99205 PR OFFICE/OUTPT VISIT, NEW, LEVL V, 60-74 MIN: ICD-10-PCS | Mod: ,,, | Performed by: STUDENT IN AN ORGANIZED HEALTH CARE EDUCATION/TRAINING PROGRAM

## 2021-08-23 PROCEDURE — 97530 THERAPEUTIC ACTIVITIES: CPT

## 2021-08-23 PROCEDURE — 84443 ASSAY THYROID STIM HORMONE: CPT

## 2021-08-23 PROCEDURE — 83735 ASSAY OF MAGNESIUM: CPT | Performed by: EMERGENCY MEDICINE

## 2021-08-23 PROCEDURE — 37195 THROMBOLYTIC THERAPY STROKE: CPT

## 2021-08-23 RX ORDER — LANOLIN ALCOHOL/MO/W.PET/CERES
800 CREAM (GRAM) TOPICAL
Status: DISCONTINUED | OUTPATIENT
Start: 2021-08-23 | End: 2021-08-24 | Stop reason: HOSPADM

## 2021-08-23 RX ORDER — MUPIROCIN 20 MG/G
OINTMENT TOPICAL 2 TIMES DAILY
Status: DISCONTINUED | OUTPATIENT
Start: 2021-08-23 | End: 2021-08-24 | Stop reason: HOSPADM

## 2021-08-23 RX ORDER — SODIUM,POTASSIUM PHOSPHATES 280-250MG
2 POWDER IN PACKET (EA) ORAL
Status: CANCELLED | OUTPATIENT
Start: 2021-08-23

## 2021-08-23 RX ORDER — AMOXICILLIN 250 MG
1 CAPSULE ORAL 2 TIMES DAILY
Status: DISCONTINUED | OUTPATIENT
Start: 2021-08-23 | End: 2021-08-24 | Stop reason: HOSPADM

## 2021-08-23 RX ORDER — ONDANSETRON 2 MG/ML
4 INJECTION INTRAMUSCULAR; INTRAVENOUS EVERY 8 HOURS PRN
Status: DISCONTINUED | OUTPATIENT
Start: 2021-08-23 | End: 2021-08-24 | Stop reason: HOSPADM

## 2021-08-23 RX ORDER — LABETALOL HYDROCHLORIDE 5 MG/ML
10 INJECTION, SOLUTION INTRAVENOUS
Status: COMPLETED | OUTPATIENT
Start: 2021-08-23 | End: 2021-08-23

## 2021-08-23 RX ORDER — LABETALOL HCL 20 MG/4 ML
10 SYRINGE (ML) INTRAVENOUS
Status: CANCELLED | OUTPATIENT
Start: 2021-08-23

## 2021-08-23 RX ORDER — OXYCODONE HYDROCHLORIDE 5 MG/1
5 TABLET ORAL ONCE
Status: COMPLETED | OUTPATIENT
Start: 2021-08-23 | End: 2021-08-23

## 2021-08-23 RX ORDER — SODIUM,POTASSIUM PHOSPHATES 280-250MG
2 POWDER IN PACKET (EA) ORAL
Status: DISCONTINUED | OUTPATIENT
Start: 2021-08-23 | End: 2021-08-24 | Stop reason: HOSPADM

## 2021-08-23 RX ORDER — LANOLIN ALCOHOL/MO/W.PET/CERES
800 CREAM (GRAM) TOPICAL
Status: CANCELLED | OUTPATIENT
Start: 2021-08-23

## 2021-08-23 RX ORDER — SODIUM CHLORIDE 0.9 % (FLUSH) 0.9 %
10 SYRINGE (ML) INJECTION
Status: DISCONTINUED | OUTPATIENT
Start: 2021-08-23 | End: 2021-08-24 | Stop reason: HOSPADM

## 2021-08-23 RX ORDER — ACETAMINOPHEN 500 MG
1000 TABLET ORAL
Status: COMPLETED | OUTPATIENT
Start: 2021-08-23 | End: 2021-08-23

## 2021-08-23 RX ORDER — ATORVASTATIN CALCIUM 40 MG/1
40 TABLET, FILM COATED ORAL DAILY
Status: CANCELLED | OUTPATIENT
Start: 2021-08-23

## 2021-08-23 RX ORDER — HYDRALAZINE HYDROCHLORIDE 20 MG/ML
10 INJECTION INTRAMUSCULAR; INTRAVENOUS EVERY 4 HOURS PRN
Status: DISCONTINUED | OUTPATIENT
Start: 2021-08-23 | End: 2021-08-24 | Stop reason: HOSPADM

## 2021-08-23 RX ORDER — ATORVASTATIN CALCIUM 20 MG/1
40 TABLET, FILM COATED ORAL DAILY
Status: DISCONTINUED | OUTPATIENT
Start: 2021-08-23 | End: 2021-08-24

## 2021-08-23 RX ORDER — LABETALOL HCL 20 MG/4 ML
10 SYRINGE (ML) INTRAVENOUS EVERY 4 HOURS PRN
Status: DISCONTINUED | OUTPATIENT
Start: 2021-08-23 | End: 2021-08-24 | Stop reason: HOSPADM

## 2021-08-23 RX ORDER — CALCIUM CARBONATE 200(500)MG
500 TABLET,CHEWABLE ORAL ONCE
Status: COMPLETED | OUTPATIENT
Start: 2021-08-23 | End: 2021-08-23

## 2021-08-23 RX ORDER — FAMOTIDINE 20 MG/1
20 TABLET, FILM COATED ORAL 2 TIMES DAILY
Status: DISCONTINUED | OUTPATIENT
Start: 2021-08-23 | End: 2021-08-24

## 2021-08-23 RX ORDER — SODIUM CHLORIDE 9 MG/ML
INJECTION, SOLUTION INTRAVENOUS
Status: COMPLETED | OUTPATIENT
Start: 2021-08-23 | End: 2021-08-23

## 2021-08-23 RX ORDER — LABETALOL HCL 20 MG/4 ML
20 SYRINGE (ML) INTRAVENOUS
Status: COMPLETED | OUTPATIENT
Start: 2021-08-23 | End: 2021-08-23

## 2021-08-23 RX ADMIN — DOCUSATE SODIUM 50MG AND SENNOSIDES 8.6MG 1 TABLET: 8.6; 5 TABLET, FILM COATED ORAL at 09:08

## 2021-08-23 RX ADMIN — FAMOTIDINE 20 MG: 20 TABLET, FILM COATED ORAL at 10:08

## 2021-08-23 RX ADMIN — ALTEPLASE 73.5 MG: KIT at 04:08

## 2021-08-23 RX ADMIN — MUPIROCIN: 20 OINTMENT TOPICAL at 08:08

## 2021-08-23 RX ADMIN — SODIUM CHLORIDE: 0.9 INJECTION, SOLUTION INTRAVENOUS at 06:08

## 2021-08-23 RX ADMIN — MUPIROCIN: 20 OINTMENT TOPICAL at 09:08

## 2021-08-23 RX ADMIN — DOCUSATE SODIUM 50MG AND SENNOSIDES 8.6MG 1 TABLET: 8.6; 5 TABLET, FILM COATED ORAL at 08:08

## 2021-08-23 RX ADMIN — LABETALOL HYDROCHLORIDE 20 MG: 5 INJECTION, SOLUTION INTRAVENOUS at 06:08

## 2021-08-23 RX ADMIN — OXYCODONE 5 MG: 5 TABLET ORAL at 07:08

## 2021-08-23 RX ADMIN — IOHEXOL 100 ML: 350 INJECTION, SOLUTION INTRAVENOUS at 05:08

## 2021-08-23 RX ADMIN — ATORVASTATIN CALCIUM 40 MG: 20 TABLET, FILM COATED ORAL at 09:08

## 2021-08-23 RX ADMIN — CALCIUM CARBONATE (ANTACID) CHEW TAB 500 MG 500 MG: 500 CHEW TAB at 10:08

## 2021-08-23 RX ADMIN — LABETALOL HYDROCHLORIDE 10 MG: 5 INJECTION INTRAVENOUS at 05:08

## 2021-08-23 RX ADMIN — ACETAMINOPHEN 1000 MG: 500 TABLET, FILM COATED ORAL at 04:08

## 2021-08-23 RX ADMIN — LABETALOL HYDROCHLORIDE 10 MG: 5 INJECTION, SOLUTION INTRAVENOUS at 07:08

## 2021-08-24 VITALS
WEIGHT: 200 LBS | SYSTOLIC BLOOD PRESSURE: 132 MMHG | BODY MASS INDEX: 37.76 KG/M2 | DIASTOLIC BLOOD PRESSURE: 86 MMHG | HEART RATE: 63 BPM | OXYGEN SATURATION: 92 % | RESPIRATION RATE: 20 BRPM | TEMPERATURE: 99 F | HEIGHT: 61 IN

## 2021-08-24 PROBLEM — I10 ESSENTIAL HYPERTENSION: Status: ACTIVE | Noted: 2021-08-24

## 2021-08-24 LAB
ALBUMIN SERPL BCP-MCNC: 3.6 G/DL (ref 3.5–5.2)
ALP SERPL-CCNC: 70 U/L (ref 55–135)
ALT SERPL W/O P-5'-P-CCNC: 10 U/L (ref 10–44)
ANION GAP SERPL CALC-SCNC: 11 MMOL/L (ref 8–16)
AST SERPL-CCNC: 12 U/L (ref 10–40)
BASOPHILS # BLD AUTO: 0.05 K/UL (ref 0–0.2)
BASOPHILS NFR BLD: 0.6 % (ref 0–1.9)
BILIRUB SERPL-MCNC: 0.3 MG/DL (ref 0.1–1)
BUN SERPL-MCNC: 8 MG/DL (ref 6–20)
CALCIUM SERPL-MCNC: 9.1 MG/DL (ref 8.7–10.5)
CHLORIDE SERPL-SCNC: 105 MMOL/L (ref 95–110)
CO2 SERPL-SCNC: 23 MMOL/L (ref 23–29)
CREAT SERPL-MCNC: 0.7 MG/DL (ref 0.5–1.4)
DIFFERENTIAL METHOD: NORMAL
EOSINOPHIL # BLD AUTO: 0.2 K/UL (ref 0–0.5)
EOSINOPHIL NFR BLD: 3 % (ref 0–8)
ERYTHROCYTE [DISTWIDTH] IN BLOOD BY AUTOMATED COUNT: 12.8 % (ref 11.5–14.5)
EST. GFR  (AFRICAN AMERICAN): >60 ML/MIN/1.73 M^2
EST. GFR  (NON AFRICAN AMERICAN): >60 ML/MIN/1.73 M^2
GLUCOSE SERPL-MCNC: 107 MG/DL (ref 70–110)
HCT VFR BLD AUTO: 38.7 % (ref 37–48.5)
HGB BLD-MCNC: 12.5 G/DL (ref 12–16)
IMM GRANULOCYTES # BLD AUTO: 0.02 K/UL (ref 0–0.04)
IMM GRANULOCYTES NFR BLD AUTO: 0.3 % (ref 0–0.5)
LYMPHOCYTES # BLD AUTO: 2.2 K/UL (ref 1–4.8)
LYMPHOCYTES NFR BLD: 28.2 % (ref 18–48)
MAGNESIUM SERPL-MCNC: 1.9 MG/DL (ref 1.6–2.6)
MCH RBC QN AUTO: 28.2 PG (ref 27–31)
MCHC RBC AUTO-ENTMCNC: 32.3 G/DL (ref 32–36)
MCV RBC AUTO: 87 FL (ref 82–98)
MONOCYTES # BLD AUTO: 0.7 K/UL (ref 0.3–1)
MONOCYTES NFR BLD: 8.4 % (ref 4–15)
NEUTROPHILS # BLD AUTO: 4.7 K/UL (ref 1.8–7.7)
NEUTROPHILS NFR BLD: 59.5 % (ref 38–73)
NRBC BLD-RTO: 0 /100 WBC
PHOSPHATE SERPL-MCNC: 3.4 MG/DL (ref 2.7–4.5)
PLATELET # BLD AUTO: 313 K/UL (ref 150–450)
PMV BLD AUTO: 10 FL (ref 9.2–12.9)
POCT GLUCOSE: 112 MG/DL (ref 70–110)
POTASSIUM SERPL-SCNC: 3.2 MMOL/L (ref 3.5–5.1)
PROT SERPL-MCNC: 6.9 G/DL (ref 6–8.4)
RBC # BLD AUTO: 4.43 M/UL (ref 4–5.4)
SODIUM SERPL-SCNC: 139 MMOL/L (ref 136–145)
WBC # BLD AUTO: 7.87 K/UL (ref 3.9–12.7)

## 2021-08-24 PROCEDURE — 99233 PR SUBSEQUENT HOSPITAL CARE,LEVL III: ICD-10-PCS | Mod: ,,, | Performed by: PSYCHIATRY & NEUROLOGY

## 2021-08-24 PROCEDURE — 84100 ASSAY OF PHOSPHORUS: CPT

## 2021-08-24 PROCEDURE — 25000003 PHARM REV CODE 250

## 2021-08-24 PROCEDURE — 63600175 PHARM REV CODE 636 W HCPCS: Performed by: STUDENT IN AN ORGANIZED HEALTH CARE EDUCATION/TRAINING PROGRAM

## 2021-08-24 PROCEDURE — 80053 COMPREHEN METABOLIC PANEL: CPT

## 2021-08-24 PROCEDURE — 85025 COMPLETE CBC W/AUTO DIFF WBC: CPT

## 2021-08-24 PROCEDURE — 25000003 PHARM REV CODE 250: Performed by: NURSE PRACTITIONER

## 2021-08-24 PROCEDURE — 99291 PR CRITICAL CARE, E/M 30-74 MINUTES: ICD-10-PCS | Mod: ,,, | Performed by: INTERNAL MEDICINE

## 2021-08-24 PROCEDURE — 94761 N-INVAS EAR/PLS OXIMETRY MLT: CPT

## 2021-08-24 PROCEDURE — 99233 SBSQ HOSP IP/OBS HIGH 50: CPT | Mod: ,,, | Performed by: PSYCHIATRY & NEUROLOGY

## 2021-08-24 PROCEDURE — 83735 ASSAY OF MAGNESIUM: CPT

## 2021-08-24 PROCEDURE — 99291 CRITICAL CARE FIRST HOUR: CPT | Mod: ,,, | Performed by: INTERNAL MEDICINE

## 2021-08-24 RX ORDER — HEPARIN SODIUM 5000 [USP'U]/ML
5000 INJECTION, SOLUTION INTRAVENOUS; SUBCUTANEOUS EVERY 8 HOURS
Status: DISCONTINUED | OUTPATIENT
Start: 2021-08-24 | End: 2021-08-24 | Stop reason: HOSPADM

## 2021-08-24 RX ORDER — ATORVASTATIN CALCIUM 20 MG/1
40 TABLET, FILM COATED ORAL DAILY
Status: DISCONTINUED | OUTPATIENT
Start: 2021-08-24 | End: 2021-08-24 | Stop reason: HOSPADM

## 2021-08-24 RX ORDER — NAPROXEN SODIUM 220 MG/1
81 TABLET, FILM COATED ORAL DAILY
Status: DISCONTINUED | OUTPATIENT
Start: 2021-08-24 | End: 2021-08-24 | Stop reason: HOSPADM

## 2021-08-24 RX ORDER — ASPIRIN 81 MG/1
81 TABLET ORAL DAILY
Status: DISCONTINUED | OUTPATIENT
Start: 2021-08-24 | End: 2021-08-24

## 2021-08-24 RX ORDER — AMLODIPINE BESYLATE 2.5 MG/1
2.5 TABLET ORAL DAILY
Qty: 30 TABLET | Refills: 0 | Status: SHIPPED | OUTPATIENT
Start: 2021-08-24 | End: 2021-10-26 | Stop reason: SDUPTHER

## 2021-08-24 RX ORDER — AMLODIPINE BESYLATE 2.5 MG/1
2.5 TABLET ORAL DAILY
Status: DISCONTINUED | OUTPATIENT
Start: 2021-08-24 | End: 2021-08-24 | Stop reason: HOSPADM

## 2021-08-24 RX ORDER — ATORVASTATIN CALCIUM 40 MG/1
40 TABLET, FILM COATED ORAL DAILY
Qty: 30 TABLET | Refills: 0 | Status: SHIPPED | OUTPATIENT
Start: 2021-08-24 | End: 2021-10-26 | Stop reason: SDUPTHER

## 2021-08-24 RX ORDER — NAPROXEN SODIUM 220 MG/1
81 TABLET, FILM COATED ORAL DAILY
Qty: 30 TABLET | Refills: 0 | Status: SHIPPED | OUTPATIENT
Start: 2021-08-24 | End: 2022-08-24

## 2021-08-24 RX ADMIN — ATORVASTATIN CALCIUM 40 MG: 20 TABLET, FILM COATED ORAL at 03:08

## 2021-08-24 RX ADMIN — POTASSIUM BICARBONATE 35 MEQ: 391 TABLET, EFFERVESCENT ORAL at 06:08

## 2021-08-24 RX ADMIN — AMLODIPINE BESYLATE 2.5 MG: 2.5 TABLET ORAL at 03:08

## 2021-08-24 RX ADMIN — HEPARIN SODIUM 5000 UNITS: 5000 INJECTION INTRAVENOUS; SUBCUTANEOUS at 01:08

## 2021-08-24 RX ADMIN — ASPIRIN 81 MG CHEWABLE TABLET 81 MG: 81 TABLET CHEWABLE at 03:08

## 2021-08-26 ENCOUNTER — PATIENT MESSAGE (OUTPATIENT)
Dept: NEUROLOGY | Facility: CLINIC | Age: 31
End: 2021-08-26

## 2021-08-26 ENCOUNTER — OFFICE VISIT (OUTPATIENT)
Dept: PRIMARY CARE CLINIC | Facility: CLINIC | Age: 31
End: 2021-08-26
Payer: MEDICAID

## 2021-08-26 VITALS
HEART RATE: 82 BPM | TEMPERATURE: 98 F | SYSTOLIC BLOOD PRESSURE: 136 MMHG | BODY MASS INDEX: 43.38 KG/M2 | DIASTOLIC BLOOD PRESSURE: 98 MMHG | OXYGEN SATURATION: 98 % | WEIGHT: 229.75 LBS | HEIGHT: 61 IN

## 2021-08-26 DIAGNOSIS — I63.512 ACUTE ISCHEMIC LEFT MCA STROKE: ICD-10-CM

## 2021-08-26 DIAGNOSIS — I10 ESSENTIAL HYPERTENSION: Primary | ICD-10-CM

## 2021-08-26 DIAGNOSIS — R53.1 RIGHT SIDED WEAKNESS: ICD-10-CM

## 2021-08-26 PROCEDURE — 99999 PR PBB SHADOW E&M-EST. PATIENT-LVL III: CPT | Mod: PBBFAC,,, | Performed by: NURSE PRACTITIONER

## 2021-08-26 PROCEDURE — 99213 OFFICE O/P EST LOW 20 MIN: CPT | Mod: PBBFAC,PN | Performed by: NURSE PRACTITIONER

## 2021-08-26 PROCEDURE — 99213 OFFICE O/P EST LOW 20 MIN: CPT | Mod: S$PBB,,, | Performed by: NURSE PRACTITIONER

## 2021-08-26 PROCEDURE — 99213 PR OFFICE/OUTPT VISIT, EST, LEVL III, 20-29 MIN: ICD-10-PCS | Mod: S$PBB,,, | Performed by: NURSE PRACTITIONER

## 2021-08-26 PROCEDURE — 99999 PR PBB SHADOW E&M-EST. PATIENT-LVL III: ICD-10-PCS | Mod: PBBFAC,,, | Performed by: NURSE PRACTITIONER

## 2021-09-07 ENCOUNTER — TELEPHONE (OUTPATIENT)
Dept: NEUROLOGY | Facility: HOSPITAL | Age: 31
End: 2021-09-07

## 2021-09-22 ENCOUNTER — PATIENT OUTREACH (OUTPATIENT)
Dept: ADMINISTRATIVE | Facility: HOSPITAL | Age: 31
End: 2021-09-22

## 2021-10-04 ENCOUNTER — TELEPHONE (OUTPATIENT)
Dept: NEUROLOGY | Facility: HOSPITAL | Age: 31
End: 2021-10-04

## 2021-10-20 ENCOUNTER — PATIENT MESSAGE (OUTPATIENT)
Dept: OBSTETRICS AND GYNECOLOGY | Facility: CLINIC | Age: 31
End: 2021-10-20
Payer: MEDICAID

## 2021-10-25 ENCOUNTER — PATIENT MESSAGE (OUTPATIENT)
Dept: PRIMARY CARE CLINIC | Facility: CLINIC | Age: 31
End: 2021-10-25
Payer: MEDICAID

## 2021-10-26 ENCOUNTER — PATIENT OUTREACH (OUTPATIENT)
Dept: ADMINISTRATIVE | Facility: HOSPITAL | Age: 31
End: 2021-10-26
Payer: MEDICAID

## 2021-10-26 DIAGNOSIS — I10 ESSENTIAL HYPERTENSION: ICD-10-CM

## 2021-10-26 DIAGNOSIS — Z76.0 MEDICATION REFILL: Primary | ICD-10-CM

## 2021-10-26 DIAGNOSIS — I63.512 ACUTE ISCHEMIC LEFT MCA STROKE: ICD-10-CM

## 2021-10-26 RX ORDER — AMLODIPINE BESYLATE 2.5 MG/1
2.5 TABLET ORAL DAILY
Qty: 30 TABLET | Refills: 0 | Status: SHIPPED | OUTPATIENT
Start: 2021-10-26 | End: 2021-12-07

## 2021-10-26 RX ORDER — ATORVASTATIN CALCIUM 40 MG/1
40 TABLET, FILM COATED ORAL DAILY
Qty: 30 TABLET | Refills: 0 | Status: SHIPPED | OUTPATIENT
Start: 2021-10-26 | End: 2021-12-07

## 2022-01-09 ENCOUNTER — HOSPITAL ENCOUNTER (EMERGENCY)
Facility: OTHER | Age: 32
Discharge: HOME | End: 2022-01-09
Attending: EMERGENCY MEDICINE
Payer: MEDICAID

## 2022-01-09 VITALS
HEART RATE: 99 BPM | HEIGHT: 61 IN | SYSTOLIC BLOOD PRESSURE: 178 MMHG | OXYGEN SATURATION: 98 % | TEMPERATURE: 99 F | DIASTOLIC BLOOD PRESSURE: 103 MMHG | WEIGHT: 210 LBS | BODY MASS INDEX: 39.65 KG/M2 | RESPIRATION RATE: 16 BRPM

## 2022-01-09 DIAGNOSIS — R10.31 RIGHT LOWER QUADRANT ABDOMINAL PAIN: Primary | ICD-10-CM

## 2022-01-09 LAB
ALBUMIN SERPL BCP-MCNC: 3.9 G/DL (ref 3.5–5.2)
ALP SERPL-CCNC: 81 U/L (ref 55–135)
ALT SERPL W/O P-5'-P-CCNC: 13 U/L (ref 10–44)
ANION GAP SERPL CALC-SCNC: 11 MMOL/L (ref 8–16)
AST SERPL-CCNC: 15 U/L (ref 10–40)
B-HCG UR QL: NEGATIVE
BASOPHILS # BLD AUTO: 0.04 K/UL (ref 0–0.2)
BASOPHILS NFR BLD: 0.5 % (ref 0–1.9)
BILIRUB SERPL-MCNC: 0.3 MG/DL (ref 0.1–1)
BILIRUB UR QL STRIP: NEGATIVE
BUN SERPL-MCNC: 13 MG/DL (ref 6–20)
CALCIUM SERPL-MCNC: 9 MG/DL (ref 8.7–10.5)
CHLORIDE SERPL-SCNC: 108 MMOL/L (ref 95–110)
CLARITY UR: CLEAR
CO2 SERPL-SCNC: 21 MMOL/L (ref 23–29)
COLOR UR: YELLOW
CREAT SERPL-MCNC: 0.7 MG/DL (ref 0.5–1.4)
CTP QC/QA: YES
DIFFERENTIAL METHOD: NORMAL
EOSINOPHIL # BLD AUTO: 0.2 K/UL (ref 0–0.5)
EOSINOPHIL NFR BLD: 2.4 % (ref 0–8)
ERYTHROCYTE [DISTWIDTH] IN BLOOD BY AUTOMATED COUNT: 13 % (ref 11.5–14.5)
EST. GFR  (AFRICAN AMERICAN): >60 ML/MIN/1.73 M^2
EST. GFR  (NON AFRICAN AMERICAN): >60 ML/MIN/1.73 M^2
GLUCOSE SERPL-MCNC: 104 MG/DL (ref 70–110)
GLUCOSE UR QL STRIP: NEGATIVE
HCT VFR BLD AUTO: 39.1 % (ref 37–48.5)
HGB BLD-MCNC: 12.6 G/DL (ref 12–16)
HGB UR QL STRIP: ABNORMAL
IMM GRANULOCYTES # BLD AUTO: 0.02 K/UL (ref 0–0.04)
IMM GRANULOCYTES NFR BLD AUTO: 0.2 % (ref 0–0.5)
KETONES UR QL STRIP: NEGATIVE
LEUKOCYTE ESTERASE UR QL STRIP: NEGATIVE
LIPASE SERPL-CCNC: 24 U/L (ref 4–60)
LYMPHOCYTES # BLD AUTO: 2.4 K/UL (ref 1–4.8)
LYMPHOCYTES NFR BLD: 28.2 % (ref 18–48)
MCH RBC QN AUTO: 28.1 PG (ref 27–31)
MCHC RBC AUTO-ENTMCNC: 32.2 G/DL (ref 32–36)
MCV RBC AUTO: 87 FL (ref 82–98)
MICROSCOPIC COMMENT: ABNORMAL
MONOCYTES # BLD AUTO: 0.7 K/UL (ref 0.3–1)
MONOCYTES NFR BLD: 8.6 % (ref 4–15)
NEUTROPHILS # BLD AUTO: 5.1 K/UL (ref 1.8–7.7)
NEUTROPHILS NFR BLD: 60.1 % (ref 38–73)
NITRITE UR QL STRIP: NEGATIVE
NRBC BLD-RTO: 0 /100 WBC
PH UR STRIP: 7 [PH] (ref 5–8)
PLATELET # BLD AUTO: 274 K/UL (ref 150–450)
PMV BLD AUTO: 10.1 FL (ref 9.2–12.9)
POTASSIUM SERPL-SCNC: 3.9 MMOL/L (ref 3.5–5.1)
PROT SERPL-MCNC: 7.2 G/DL (ref 6–8.4)
PROT UR QL STRIP: NEGATIVE
RBC # BLD AUTO: 4.48 M/UL (ref 4–5.4)
RBC #/AREA URNS HPF: 7 /HPF (ref 0–4)
SODIUM SERPL-SCNC: 140 MMOL/L (ref 136–145)
SP GR UR STRIP: 1.02 (ref 1–1.03)
SQUAMOUS #/AREA URNS HPF: 5 /HPF
URN SPEC COLLECT METH UR: ABNORMAL
UROBILINOGEN UR STRIP-ACNC: NEGATIVE EU/DL
WBC # BLD AUTO: 8.5 K/UL (ref 3.9–12.7)

## 2022-01-09 PROCEDURE — 63600175 PHARM REV CODE 636 W HCPCS: Performed by: EMERGENCY MEDICINE

## 2022-01-09 PROCEDURE — 80053 COMPREHEN METABOLIC PANEL: CPT | Performed by: EMERGENCY MEDICINE

## 2022-01-09 PROCEDURE — 83690 ASSAY OF LIPASE: CPT | Performed by: EMERGENCY MEDICINE

## 2022-01-09 PROCEDURE — 85025 COMPLETE CBC W/AUTO DIFF WBC: CPT | Performed by: EMERGENCY MEDICINE

## 2022-01-09 PROCEDURE — 99284 EMERGENCY DEPT VISIT MOD MDM: CPT | Mod: 25

## 2022-01-09 PROCEDURE — 81025 URINE PREGNANCY TEST: CPT | Performed by: EMERGENCY MEDICINE

## 2022-01-09 PROCEDURE — 81000 URINALYSIS NONAUTO W/SCOPE: CPT | Performed by: EMERGENCY MEDICINE

## 2022-01-09 PROCEDURE — 96372 THER/PROPH/DIAG INJ SC/IM: CPT

## 2022-01-09 RX ORDER — KETOROLAC TROMETHAMINE 30 MG/ML
15 INJECTION, SOLUTION INTRAMUSCULAR; INTRAVENOUS
Status: COMPLETED | OUTPATIENT
Start: 2022-01-09 | End: 2022-01-09

## 2022-01-09 RX ORDER — FAMOTIDINE 20 MG/1
20 TABLET, FILM COATED ORAL 2 TIMES DAILY
Qty: 20 TABLET | Refills: 0 | Status: SHIPPED | OUTPATIENT
Start: 2022-01-09 | End: 2022-04-01 | Stop reason: SDUPTHER

## 2022-01-09 RX ADMIN — KETOROLAC TROMETHAMINE 15 MG: 30 INJECTION, SOLUTION INTRAMUSCULAR; INTRAVENOUS at 11:01

## 2022-01-09 NOTE — ED PROVIDER NOTES
"Encounter Date: 1/9/2022    SCRIBE #1 NOTE: I, Sofia Calderon, am scribing for, and in the presence of, Dr. Camacho.       History     Chief Complaint   Patient presents with    Abdominal Pain     Pt c.o right lower abd pain onset 4 days ago.  Denies n/v  Denies urinary symptoms or vaginal discharge.  AAO x 3 nadn skin w.d No change in appetite. + rebound tenderness to right lower abd      Time seen by provider: 8:54 AM    This is a 31 y.o. female with a history of HTN and CVA who presents with complaint of abdominal pain onset 3 days ago. The patient reports sudden onset pain Thursday morning while driving, initially epigastric than right upper abdomen. She has had had intermittent abdominal pain episodes lasting about 5 minutes since then, now more in her right abdomen. The pain radiates to her right flank as well, worse with walking and movement. No change with eating. She states "it feels like someone is stepping on me." No urinary issues, nausea, vomiting, or diarrhea.  No history of similar previous episodes. No other complaints at this time.       The history is provided by the patient.     Review of patient's allergies indicates:  No Known Allergies  Past Medical History:   Diagnosis Date    Hypertension     Hypertension affecting pregnancy in second trimester 5/1/2020     Past Surgical History:   Procedure Laterality Date    POSTPARTUM LIGATION OF FALLOPIAN TUBE Bilateral 9/9/2020    Procedure: LIGATION, FALLOPIAN TUBE, POSTPARTUM;  Surgeon: KODI Talley MD;  Location: [facility] L&D;  Service: OB/GYN;  Laterality: Bilateral;     Family History   Problem Relation Age of Onset    Colon cancer Neg Hx     Eclampsia Neg Hx     Breast cancer Neg Hx     Ovarian cancer Neg Hx     Cancer Neg Hx      Social History     Tobacco Use    Smoking status: Never Smoker    Smokeless tobacco: Never Used   Substance Use Topics    Alcohol use: No    Drug use: No     Review of Systems   Constitutional: Negative for " appetite change and fever.   HENT: Negative for sore throat.    Respiratory: Negative for shortness of breath.    Cardiovascular: Negative for chest pain.   Gastrointestinal: Positive for abdominal pain. Negative for nausea and vomiting.   Genitourinary: Negative for difficulty urinating, dysuria, hematuria and urgency.   Musculoskeletal: Negative for back pain.   Skin: Negative for rash.   Neurological: Negative for weakness.   Hematological: Does not bruise/bleed easily.       Physical Exam     Initial Vitals   BP Pulse Resp Temp SpO2   01/09/22 0802 01/09/22 0802 01/09/22 0802 01/09/22 0802 01/09/22 0822   (!) 178/103 99 16 98.8 °F (37.1 °C) (S) 98 %      MAP       --                Physical Exam    Nursing note and vitals reviewed.  Constitutional: She appears well-developed and well-nourished. She is not diaphoretic. She is cooperative.  Non-toxic appearance. No distress.   HENT:   Head: Normocephalic and atraumatic.   Mouth/Throat: Oropharynx is clear and moist.   Eyes: Conjunctivae and EOM are normal.   Neck: Neck supple.   Normal range of motion.   Full passive range of motion without pain.     Cardiovascular: Normal rate, regular rhythm, normal heart sounds, intact distal pulses and normal pulses.   No murmur heard.  Pulmonary/Chest: Effort normal and breath sounds normal. No respiratory distress. She has no wheezes. She has no rhonchi. She has no rales.   Abdominal: Abdomen is soft. She exhibits no distension. There is abdominal tenderness (worse in RLQ) in the right upper quadrant and right lower quadrant.   No right CVA tenderness.  No left CVA tenderness. There is no rebound and no guarding.   Musculoskeletal:         General: No edema. Normal range of motion.      Cervical back: Full passive range of motion without pain, normal range of motion and neck supple.     Neurological: She is alert and oriented to person, place, and time. GCS score is 15. GCS eye subscore is 4. GCS verbal subscore is 5. GCS  motor subscore is 6.   Skin: Skin is warm, dry and intact.   Psychiatric: She has a normal mood and affect. Her speech is normal.         ED Course   Procedures  Labs Reviewed   URINALYSIS, REFLEX TO URINE CULTURE - Abnormal; Notable for the following components:       Result Value    Occult Blood UA 1+ (*)     All other components within normal limits    Narrative:     Specimen Source->Urine   URINALYSIS MICROSCOPIC - Abnormal; Notable for the following components:    RBC, UA 7 (*)     All other components within normal limits    Narrative:     Specimen Source->Urine   COMPREHENSIVE METABOLIC PANEL - Abnormal; Notable for the following components:    CO2 21 (*)     All other components within normal limits   CBC W/ AUTO DIFFERENTIAL   LIPASE   POCT URINE PREGNANCY          Imaging Results          CT Abdomen Pelvis  Without Contrast (Final result)  Result time 01/09/22 13:19:59    Final result by Roberto Boles Jr., MD (01/09/22 13:19:59)                 Impression:      No acute intra-abdominal process    Suspect fibroid uterus      Electronically signed by: Roberto Rosario Jr  Date:    01/09/2022  Time:    13:19             Narrative:    EXAMINATION:  CT ABDOMEN PELVIS WITHOUT CONTRAST    CLINICAL HISTORY:  RLQ abdominal pain (Age >= 14y);    TECHNIQUE:  Low dose axial images, sagittal and coronal reformations were obtained from the lung bases to the pubic symphysis.  Contrast was not administered.    COMPARISON:  None    FINDINGS:  Lung Bases: Unremarkable.    Kidneys/ Ureters: Unremarkable.    Liver: Normal in size and attenuation, with no focal hepatic lesions.    Gallbladder: No calcified gallstones.    Bile Ducts: No evidence of dilated ducts.    Pancreas: No mass or peripancreatic fat stranding.    Spleen: Unremarkable.    Adrenals: Unremarkable.    Pelvic organs: Uterus appears enlarged and with a lobulated contour suggesting the possibility of fibroids.  No definite adnexal masses.  Urinary bladder  unremarkable.    GI Tract/Mesentery: No evidence of bowel obstruction or inflammation.  Normal appendix.    Retroperitoneum: No significant adenopathy.    Vasculature: No significant atherosclerosis or aneurysm.    Bones: Unremarkable.                                 Medications   ketorolac injection 15 mg (15 mg Intravenous Given 1/9/22 5209)     Medical Decision Making:   History:   Old Medical Records: I decided to obtain old medical records.  Initial Assessment:         31-year-old female with history of hypertension, previous CVA presents for evaluation of right-sided abdominal pain that started 3 days ago.  No clear inciting event prior to onset of pain, it was initially located in epigastric and right upper quadrant but now worst pain is in right lower quadrant.  She describes brief episodes of pain with no clear precipitant, no relation to p.o. intake, denies any associated nausea, diarrhea, fevers, or urinary symptoms.  No history of similar previous episodes.  On arrival patient with mild epigastric and right upper quadrant tenderness but most tender in right lower quadrant.  Will check labs and do CT abdomen to evaluate for appendicitis, but also consider biliary colic, gastritis, constipation, acute cholecystitis, or ascending UTI.      Initial labs reassuring with normal WBC, no elevated LFT/lipase, no sign UTI.  She does have 7 rbc's in urine so would also consider renal colic.  CT abdomen done for further evaluation and shows no acute findings, only fibroid uterus that is unlikely to be related to current symptoms.  Given initial onset of epigastric pain, could be gastritis, will start Pepcid b.i.d. and advised bland diet.  On reassessment patient with no acute abdomen, no indication for admission or further observation at this time.      Clinical Tests:   Lab Tests: Ordered and Reviewed  Radiological Study: Ordered and Reviewed          Scribe Attestation:   Scribe #1: I performed the above scribed  service and the documentation accurately describes the services I performed. I attest to the accuracy of the note.               I, Dr. William Parikh, personally performed the services described in this documentation. All medical record entries made by the scribe were at my direction and in my presence.  I have reviewed the chart and agree that the record reflects my personal performance and is accurate and complete. William Parikh MD.  12:57 AM 01/11/2022        Clinical Impression:     1. Right lower quadrant abdominal pain           ED Disposition Condition    Discharge Stable        ED Prescriptions     Medication Sig Dispense Start Date End Date Auth. Provider    famotidine (PEPCID) 20 MG tablet Take 1 tablet (20 mg total) by mouth 2 (two) times daily. 20 tablet 1/9/2022 1/9/2023 William Parikh MD        Follow-up Information     Follow up With Specialties Details Why Contact Info    [facility] Emergency Dept Emergency Medicine Go to  If symptoms worsen [address] [facility] [id number] [phone]           William Parikh MD  01/11/22 0057

## 2022-01-25 ENCOUNTER — NURSE TRIAGE (OUTPATIENT)
Dept: ADMINISTRATIVE | Facility: CLINIC | Age: 32
End: 2022-01-25
Payer: MEDICAID

## 2022-01-25 ENCOUNTER — HOSPITAL ENCOUNTER (EMERGENCY)
Facility: HOSPITAL | Age: 32
Discharge: HOME | End: 2022-01-25
Attending: EMERGENCY MEDICINE
Payer: MEDICAID

## 2022-01-25 VITALS
WEIGHT: 219 LBS | HEART RATE: 68 BPM | DIASTOLIC BLOOD PRESSURE: 88 MMHG | TEMPERATURE: 98 F | SYSTOLIC BLOOD PRESSURE: 132 MMHG | OXYGEN SATURATION: 97 % | RESPIRATION RATE: 16 BRPM | HEIGHT: 61 IN | BODY MASS INDEX: 41.35 KG/M2

## 2022-01-25 DIAGNOSIS — R07.89 MUSCULOSKELETAL CHEST PAIN: ICD-10-CM

## 2022-01-25 DIAGNOSIS — R07.9 CHEST PAIN: ICD-10-CM

## 2022-01-25 DIAGNOSIS — R05.9 COUGH: ICD-10-CM

## 2022-01-25 DIAGNOSIS — J06.9 VIRAL URI WITH COUGH: Primary | ICD-10-CM

## 2022-01-25 LAB
B-HCG UR QL: NEGATIVE
CTP QC/QA: YES

## 2022-01-25 PROCEDURE — 94761 N-INVAS EAR/PLS OXIMETRY MLT: CPT

## 2022-01-25 PROCEDURE — 81025 URINE PREGNANCY TEST: CPT | Performed by: NURSE PRACTITIONER

## 2022-01-25 PROCEDURE — U0003 INFECTIOUS AGENT DETECTION BY NUCLEIC ACID (DNA OR RNA); SEVERE ACUTE RESPIRATORY SYNDROME CORONAVIRUS 2 (SARS-COV-2) (CORONAVIRUS DISEASE [COVID-19]), AMPLIFIED PROBE TECHNIQUE, MAKING USE OF HIGH THROUGHPUT TECHNOLOGIES AS DESCRIBED BY CMS-2020-01-R: HCPCS | Performed by: PHYSICIAN ASSISTANT

## 2022-01-25 PROCEDURE — U0005 INFEC AGEN DETEC AMPLI PROBE: HCPCS | Performed by: PHYSICIAN ASSISTANT

## 2022-01-25 PROCEDURE — 25000003 PHARM REV CODE 250: Performed by: NURSE PRACTITIONER

## 2022-01-25 PROCEDURE — 99285 EMERGENCY DEPT VISIT HI MDM: CPT | Mod: 25

## 2022-01-25 PROCEDURE — 93010 EKG 12-LEAD: ICD-10-PCS | Mod: ,,, | Performed by: INTERNAL MEDICINE

## 2022-01-25 PROCEDURE — 93010 ELECTROCARDIOGRAM REPORT: CPT | Mod: ,,, | Performed by: INTERNAL MEDICINE

## 2022-01-25 PROCEDURE — 93005 ELECTROCARDIOGRAM TRACING: CPT

## 2022-01-25 RX ORDER — KETOROLAC TROMETHAMINE 10 MG/1
10 TABLET, FILM COATED ORAL EVERY 6 HOURS
Qty: 20 TABLET | Refills: 0 | Status: SHIPPED | OUTPATIENT
Start: 2022-01-25 | End: 2022-01-30

## 2022-01-25 RX ORDER — ASPIRIN 325 MG
325 TABLET ORAL
Status: COMPLETED | OUTPATIENT
Start: 2022-01-25 | End: 2022-01-25

## 2022-01-25 RX ORDER — BENZONATATE 100 MG/1
100 CAPSULE ORAL 3 TIMES DAILY PRN
Qty: 20 CAPSULE | Refills: 0 | Status: SHIPPED | OUTPATIENT
Start: 2022-01-25 | End: 2022-02-04

## 2022-01-25 RX ORDER — OMEPRAZOLE 20 MG/1
20 CAPSULE, DELAYED RELEASE ORAL DAILY
Qty: 30 CAPSULE | Refills: 11 | Status: SHIPPED | OUTPATIENT
Start: 2022-01-25 | End: 2022-04-01 | Stop reason: SDUPTHER

## 2022-01-25 RX ADMIN — ASPIRIN 325 MG ORAL TABLET 325 MG: 325 PILL ORAL at 11:01

## 2022-01-25 NOTE — DISCHARGE INSTRUCTIONS
Rest.  Take medication as prescribed.  Return to the emergency department for any change or concerning symptoms.  You need to follow-up with your primary care doctor.

## 2022-01-25 NOTE — ED NOTES
Pt has been ordered cardiac monitoring for c/o chest pain. Charge Nurse Amna made aware of same. Pt to remain in Q track until bed becomes available and cardiac monitoring possible.

## 2022-01-25 NOTE — ED TRIAGE NOTES
Patient presents to ED c/o Bilateral leg swelling x 2 days, constant pain 8/10, patient states laying down makes it works, ambulating and applying pressure makes it better. Nausea, mid sternal chest pain 7/10 constant, sob, cough, Frontal HA    Denies v/d, dizziness, change in bowel and bladder    AAO x 4.

## 2022-01-25 NOTE — ED PROVIDER NOTES
"Encounter Date: 1/25/2022    SCRIBE #1 NOTE: I, Pam Mathis, am scribing for, and in the presence of,  Mirna Gonzalez PA-C. I have scribed the following portions of the note - Other sections scribed: HPI, ROS.       History     Chief Complaint   Patient presents with    Leg Swelling     Pt reports bilateral leg swelling since yesterday. Pt reports mid-sternal chest pain x 1 week, was since at urgent care last week for it. Reports hx of HTN.     This 31 y.o female, with a medical history of Hypertension, presents to the ED c/o chest pain and cough that began last week. Pt describes the pain as "burning and stabbing", noting that it wraps around to the back. She states that the pain is exacerbated with coughing. Pt notes that she was evaluated last week for the symptoms and was found to have fluid in her ears. She reports that the chest pain and cough have since worsened. She adds that she has also been experiencing numbness to the bilateral hands, bilateral legs swelling (began yesterday), nausea, and right sided abdominal pain. The symptoms are acute, constant and moderate (7/10). Of note, pt is vaccinated for COVID-19. No recent sick contacts. She denies shortness of breath, emesis or any other associated symptoms.     The history is provided by the patient.     Review of patient's allergies indicates:  No Known Allergies  Past Medical History:   Diagnosis Date    Hypertension     Hypertension affecting pregnancy in second trimester 5/1/2020     Past Surgical History:   Procedure Laterality Date    POSTPARTUM LIGATION OF FALLOPIAN TUBE Bilateral 9/9/2020    Procedure: LIGATION, FALLOPIAN TUBE, POSTPARTUM;  Surgeon: KODI Talley MD;  Location: [facility] L&D;  Service: OB/GYN;  Laterality: Bilateral;     Family History   Problem Relation Age of Onset    Colon cancer Neg Hx     Eclampsia Neg Hx     Breast cancer Neg Hx     Ovarian cancer Neg Hx     Cancer Neg Hx      Social History     Tobacco Use    " Smoking status: Never Smoker    Smokeless tobacco: Never Used   Substance Use Topics    Alcohol use: No    Drug use: No     Review of Systems   Constitutional: Negative for fever.   HENT: Negative for sore throat.    Respiratory: Positive for cough. Negative for shortness of breath.    Cardiovascular: Positive for chest pain (wraps around to the back) and leg swelling (bilateral).   Gastrointestinal: Positive for abdominal pain (right sided) and nausea. Negative for vomiting.   Genitourinary: Negative for dysuria.   Musculoskeletal: Negative for back pain.   Skin: Negative for rash.   Neurological: Positive for numbness (to the bilateral hands). Negative for weakness.       Physical Exam     Initial Vitals [01/25/22 1003]   BP Pulse Resp Temp SpO2   (!) 138/94 90 18 98.7 °F (37.1 °C) 97 %      MAP       --         Physical Exam    Nursing note and vitals reviewed.  Constitutional: She appears well-developed and well-nourished. She is not diaphoretic. No distress.   HENT:   Head: Normocephalic and atraumatic.   Nose: Nose normal.   There is mild posterior pharyngeal erythema noted without tonsillar exudate.   Eyes: EOM are normal. Pupils are equal, round, and reactive to light.   Neck: Neck supple.   Normal range of motion.  Cardiovascular: Normal rate and regular rhythm.   No murmur heard.  Pulmonary/Chest: Breath sounds normal. No respiratory distress. She has no wheezes. She has no rhonchi. She has no rales.   Abdominal: Abdomen is soft. Bowel sounds are normal. She exhibits no distension. There is no abdominal tenderness. There is no rebound and no guarding.   Musculoskeletal:         General: No tenderness or edema. Normal range of motion.      Cervical back: Normal range of motion and neck supple.     Neurological: She is alert and oriented to person, place, and time. No cranial nerve deficit.   Skin: Skin is warm. Capillary refill takes less than 2 seconds. No rash noted. No erythema.         ED Course    Procedures  Labs Reviewed   SARS-COV-2 (COVID-19) QUALITATIVE PCR   POCT URINE PREGNANCY     EKG Readings: (Independently Interpreted)   Initial Reading: No STEMI. Rhythm: Normal Sinus Rhythm. Heart Rate: 77. Ectopy: No Ectopy. Conduction: Normal.       Imaging Results          X-Ray Chest PA And Lateral (Final result)  Result time 01/25/22 14:20:42    Final result by Kt Tuttle MD (01/25/22 14:20:42)                 Impression:      No radiographic evidence of pneumonia or other source of cough on this single view, noting that early/mild viral pneumonia may be radiographically occult.      Electronically signed by: Kt Tuttle MD  Date:    01/25/2022  Time:    14:20             Narrative:    EXAMINATION:  XR CHEST PA AND LATERAL    CLINICAL HISTORY:  Cough, unspecified    TECHNIQUE:  PA and lateral views of the chest were performed.    COMPARISON:  CT abdomen and pelvis 01/09/2022, chest radiograph 03/24/2020    FINDINGS:  No detrimental change.The lungs are symmetrically well expanded and clear, with normal appearance of pulmonary vasculature and no pleural effusion or pneumothorax.    The cardiac silhouette is normal in size. The hilar and mediastinal contours are unremarkable.    Bones are intact.                              X-Rays:   Independently Interpreted Readings:   Other Readings:  Chest x-ray reveals no acute cardiopulmonary processes.    Medications   aspirin tablet 325 mg (325 mg Oral Given 1/25/22 1130)           APC / Resident Notes:   This is an urgent evaluation of a 31-year-old female who presents to the emergency department with cough and chest pain.    According to the patient, she went to her primary care doctor who told her she had a viral illness.  Now she reports worsening chest pain that is exacerbated with coughing.    The patient is currently afebrile and nontoxic in appearance.  Vital signs are stable.  Physical exam reveals mild posterior pharyngeal erythema without tonsillar  exudate.  The remaining physical exam is unremarkable.  Urine pregnancy test was negative.  Routine COVID is pending.  Chest x-ray revealed no acute cardiopulmonary processes or evidence of pneumonia.  EKG revealed a normal ventricular rate at 77 with a normal sinus rhythm.  I carefully considered but doubt cardiac origin of her chest pain as it is reproducible with coughing.  I will treat her for a viral URI with cough with Tessalon Perles, anti-inflammatories and rest.  She will need to follow up with her primary care doctor.  This was discussed in detail with the patient she verbalized understanding and agreement.  She is currently safe and stable for discharge at this time.     Scribe Attestation:   Scribe #1: I performed the above scribed service and the documentation accurately describes the services I performed. I attest to the accuracy of the note.                 Clinical Impression:   Final diagnoses:  [R07.9] Chest pain  [R05.9] Cough  [J06.9] Viral URI with cough (Primary)  [R07.89] Musculoskeletal chest pain          ED Disposition Condition    Discharge Stable        ED Prescriptions     Medication Sig Dispense Start Date End Date Auth. Provider    ketorolac (TORADOL) 10 mg tablet Take 1 tablet (10 mg total) by mouth every 6 (six) hours. for 5 days 20 tablet 1/25/2022 1/30/2022 Mirna Gonzalez PA-C    omeprazole (PRILOSEC) 20 MG capsule Take 1 capsule (20 mg total) by mouth once daily. 30 capsule 1/25/2022 1/25/2023 Mirna Gonzalez PA-C    benzonatate (TESSALON) 100 MG capsule Take 1 capsule (100 mg total) by mouth 3 (three) times daily as needed for Cough. 20 capsule 1/25/2022 2/4/2022 Mirna Gonzalez PA-C        Follow-up Information     Follow up With Specialties Details Why Contact Info    Esther Thurman MD Obstetrics and Gynecology, Obstetrics   [address] [id number] [phone]             IMirna PA-C, personally performed the services described  in this documentation. All medical record entries made by the scribe were at my direction and in my presence. I have reviewed the chart and agree that the record reflects my personal performance and is accurate and complete.     Mirna Gonzalez PA-C  01/25/22 1979

## 2022-01-25 NOTE — TELEPHONE ENCOUNTER
OOC RN from ,   Patient has HBP, legs are swelling, hands and feet swollen.    History of mild stroke.  Feet swelling too, Denies. Sob.  Thinks it may be her bp but does not know what her b/p is.  For any worsening symptoms to call back OOC RN.         Reason for Disposition   [1] Can't use hand or can barely use hand AND [2] new-onset    Additional Information   Negative: SEVERE difficulty breathing (e.g., struggling for each breath, speaks in single words)   Negative: Sounds like a life-threatening emergency to the triager   Negative: Difficulty breathing at rest   Negative: Looks like a broken bone or dislocated joint (e.g., crooked or deformed)   Negative: Entire hand is cool or blue in comparison to other side    Protocols used: HAND SWELLING-A-AH

## 2022-01-25 NOTE — FIRST PROVIDER EVALUATION
Emergency Department TeleTriage Encounter Note      CHIEF COMPLAINT    Chief Complaint   Patient presents with    Leg Swelling     Pt reports bilateral leg swelling since yesterday. Pt reports mid-sternal chest pain x 1 week, was since at urgent care last week for it. Reports hx of HTN.       VITAL SIGNS   Initial Vitals [01/25/22 1003]   BP Pulse Resp Temp SpO2   (!) 138/94 90 18 98.7 °F (37.1 °C) 97 %      MAP       --            ALLERGIES    Review of patient's allergies indicates:  No Known Allergies    PROVIDER TRIAGE NOTE  ONe week of midsternal chest pain, bilat leg swelling.  Pos for sob, reports headache.      ORDERS  Labs Reviewed - No data to display    ED Orders (720h ago, onward)    Start Ordered     Status Ordering Provider    01/25/22 1406 01/25/22 1105  Troponin I #2  Once Timed         Ordered CHAMPAGNE LEAH A.    01/25/22 1115 01/25/22 1105  aspirin tablet 325 mg  ED 1 Time         Ordered CHAMPAGNE, LEAH A.    01/25/22 1106 01/25/22 1105  Vital signs  Every 15 min         Ordered CHAMPAGNE LEAH A.    01/25/22 1106 01/25/22 1105  Cardiac Monitoring - Adult  Continuous        Comments: Notify Physician If:    Ordered CHAMPAGNE, LEAH A.    01/25/22 1106 01/25/22 1105  Pulse Oximetry Continuous  Continuous         Ordered CHAMPAGNE, LEAH A.    01/25/22 1106 01/25/22 1105  Diet NPO  Diet effective now         Ordered CHAMPAGNE, LEAH A.    01/25/22 1106 01/25/22 1105  Saline lock IV  Once         Ordered CHAMPAGNE, LEAH A.    01/25/22 1106 01/25/22 1105  EKG 12-lead  Once        Comments: Do not perform if previously done during this visit/ triage    Ordered CHAMPAGNE, LEAH A.    01/25/22 1106 01/25/22 1105  CBC auto differential  STAT         Ordered CHAMPAGNE, LEAH A.    01/25/22 1106 01/25/22 1105  Comprehensive metabolic panel  STAT         Ordered CHAMPAGNE, LEAH A.    01/25/22 1106 01/25/22 1105  Troponin I #1  STAT         Ordered CHAMPAGNE, LEAH A.    01/25/22 1106 01/25/22 1105   B-Type natriuretic peptide (BNP)  STAT         Ordered LEAH LUGO    01/25/22 1106 01/25/22 1105  POCT urine pregnancy  Once         Ordered LEAH LUGO            Virtual Visit Note: The provider triage portion of this emergency department evaluation and documentation was performed via [employer], a HIPAA-compliant telemedicine application, in concert with a tele-presenter in the room. A face to face patient evaluation with one of my colleagues will occur once the patient is placed in an emergency department room.      DISCLAIMER: This note was prepared with [employer] voice recognition transcription software. Garbled syntax, mangled pronouns, and other bizarre constructions may be attributed to that software system.

## 2022-01-26 LAB
SARS-COV-2 RNA RESP QL NAA+PROBE: NOT DETECTED
SARS-COV-2- CYCLE NUMBER: NORMAL

## 2022-03-07 ENCOUNTER — PATIENT MESSAGE (OUTPATIENT)
Dept: PRIMARY CARE CLINIC | Facility: CLINIC | Age: 32
End: 2022-03-07
Payer: COMMERCIAL

## 2022-03-07 DIAGNOSIS — Z76.0 MEDICATION REFILL: ICD-10-CM

## 2022-03-07 DIAGNOSIS — I10 ESSENTIAL HYPERTENSION: Primary | ICD-10-CM

## 2022-03-07 DIAGNOSIS — I63.512 ACUTE ISCHEMIC LEFT MCA STROKE: ICD-10-CM

## 2022-03-07 RX ORDER — AMLODIPINE BESYLATE 2.5 MG/1
2.5 TABLET ORAL DAILY
Qty: 30 TABLET | Refills: 0 | Status: SHIPPED | OUTPATIENT
Start: 2022-03-07 | End: 2022-07-12 | Stop reason: SDUPTHER

## 2022-04-01 ENCOUNTER — OFFICE VISIT (OUTPATIENT)
Dept: FAMILY MEDICINE | Facility: CLINIC | Age: 32
End: 2022-04-01
Payer: COMMERCIAL

## 2022-04-01 VITALS
DIASTOLIC BLOOD PRESSURE: 72 MMHG | HEART RATE: 91 BPM | SYSTOLIC BLOOD PRESSURE: 124 MMHG | WEIGHT: 248.38 LBS | TEMPERATURE: 98 F | OXYGEN SATURATION: 99 % | BODY MASS INDEX: 46.89 KG/M2 | HEIGHT: 61 IN

## 2022-04-01 DIAGNOSIS — R11.0 NAUSEA: ICD-10-CM

## 2022-04-01 DIAGNOSIS — N30.00 ACUTE CYSTITIS WITHOUT HEMATURIA: ICD-10-CM

## 2022-04-01 DIAGNOSIS — K21.9 GASTROESOPHAGEAL REFLUX DISEASE, UNSPECIFIED WHETHER ESOPHAGITIS PRESENT: ICD-10-CM

## 2022-04-01 DIAGNOSIS — R35.0 URINARY FREQUENCY: Primary | ICD-10-CM

## 2022-04-01 LAB
BLOOD URINE, POC: NEGATIVE
CLARITY, POC UA: ABNORMAL
COLOR, POC UA: YELLOW
GLUCOSE UR QL STRIP: NORMAL
KETONES UR QL STRIP: NEGATIVE
LEUKOCYTE ESTERASE URINE, POC: NEGATIVE
NITRITE, POC UA: NEGATIVE
PH, POC UA: 7
PROTEIN, POC: ABNORMAL
SPECIFIC GRAVITY, POC UA: 1015
UROBILINOGEN, POC UA: NORMAL

## 2022-04-01 PROCEDURE — 99214 OFFICE O/P EST MOD 30 MIN: CPT | Mod: S$GLB,,, | Performed by: STUDENT IN AN ORGANIZED HEALTH CARE EDUCATION/TRAINING PROGRAM

## 2022-04-01 PROCEDURE — 99999 PR PBB SHADOW E&M-EST. PATIENT-LVL III: ICD-10-PCS | Mod: PBBFAC,,, | Performed by: STUDENT IN AN ORGANIZED HEALTH CARE EDUCATION/TRAINING PROGRAM

## 2022-04-01 PROCEDURE — 87086 URINE CULTURE/COLONY COUNT: CPT | Performed by: STUDENT IN AN ORGANIZED HEALTH CARE EDUCATION/TRAINING PROGRAM

## 2022-04-01 PROCEDURE — 3008F BODY MASS INDEX DOCD: CPT | Mod: CPTII,S$GLB,, | Performed by: STUDENT IN AN ORGANIZED HEALTH CARE EDUCATION/TRAINING PROGRAM

## 2022-04-01 PROCEDURE — 1159F PR MEDICATION LIST DOCUMENTED IN MEDICAL RECORD: ICD-10-PCS | Mod: CPTII,S$GLB,, | Performed by: STUDENT IN AN ORGANIZED HEALTH CARE EDUCATION/TRAINING PROGRAM

## 2022-04-01 PROCEDURE — 3078F DIAST BP <80 MM HG: CPT | Mod: CPTII,S$GLB,, | Performed by: STUDENT IN AN ORGANIZED HEALTH CARE EDUCATION/TRAINING PROGRAM

## 2022-04-01 PROCEDURE — 3078F PR MOST RECENT DIASTOLIC BLOOD PRESSURE < 80 MM HG: ICD-10-PCS | Mod: CPTII,S$GLB,, | Performed by: STUDENT IN AN ORGANIZED HEALTH CARE EDUCATION/TRAINING PROGRAM

## 2022-04-01 PROCEDURE — 99214 PR OFFICE/OUTPT VISIT, EST, LEVL IV, 30-39 MIN: ICD-10-PCS | Mod: S$GLB,,, | Performed by: STUDENT IN AN ORGANIZED HEALTH CARE EDUCATION/TRAINING PROGRAM

## 2022-04-01 PROCEDURE — 1160F RVW MEDS BY RX/DR IN RCRD: CPT | Mod: CPTII,S$GLB,, | Performed by: STUDENT IN AN ORGANIZED HEALTH CARE EDUCATION/TRAINING PROGRAM

## 2022-04-01 PROCEDURE — 1159F MED LIST DOCD IN RCRD: CPT | Mod: CPTII,S$GLB,, | Performed by: STUDENT IN AN ORGANIZED HEALTH CARE EDUCATION/TRAINING PROGRAM

## 2022-04-01 PROCEDURE — 1160F PR REVIEW ALL MEDS BY PRESCRIBER/CLIN PHARMACIST DOCUMENTED: ICD-10-PCS | Mod: CPTII,S$GLB,, | Performed by: STUDENT IN AN ORGANIZED HEALTH CARE EDUCATION/TRAINING PROGRAM

## 2022-04-01 PROCEDURE — 3008F PR BODY MASS INDEX (BMI) DOCUMENTED: ICD-10-PCS | Mod: CPTII,S$GLB,, | Performed by: STUDENT IN AN ORGANIZED HEALTH CARE EDUCATION/TRAINING PROGRAM

## 2022-04-01 PROCEDURE — 3074F PR MOST RECENT SYSTOLIC BLOOD PRESSURE < 130 MM HG: ICD-10-PCS | Mod: CPTII,S$GLB,, | Performed by: STUDENT IN AN ORGANIZED HEALTH CARE EDUCATION/TRAINING PROGRAM

## 2022-04-01 PROCEDURE — 81002 POCT URINE DIPSTICK WITHOUT MICROSCOPE: ICD-10-PCS | Mod: S$GLB,,, | Performed by: STUDENT IN AN ORGANIZED HEALTH CARE EDUCATION/TRAINING PROGRAM

## 2022-04-01 PROCEDURE — 99999 PR PBB SHADOW E&M-EST. PATIENT-LVL III: CPT | Mod: PBBFAC,,, | Performed by: STUDENT IN AN ORGANIZED HEALTH CARE EDUCATION/TRAINING PROGRAM

## 2022-04-01 PROCEDURE — 3074F SYST BP LT 130 MM HG: CPT | Mod: CPTII,S$GLB,, | Performed by: STUDENT IN AN ORGANIZED HEALTH CARE EDUCATION/TRAINING PROGRAM

## 2022-04-01 PROCEDURE — 81002 URINALYSIS NONAUTO W/O SCOPE: CPT | Mod: S$GLB,,, | Performed by: STUDENT IN AN ORGANIZED HEALTH CARE EDUCATION/TRAINING PROGRAM

## 2022-04-01 RX ORDER — FAMOTIDINE 20 MG/1
20 TABLET, FILM COATED ORAL 2 TIMES DAILY
Qty: 60 TABLET | Refills: 1 | Status: SHIPPED | OUTPATIENT
Start: 2022-04-01

## 2022-04-01 RX ORDER — OMEPRAZOLE 40 MG/1
40 CAPSULE, DELAYED RELEASE ORAL DAILY
Qty: 60 CAPSULE | Refills: 1 | Status: SHIPPED | OUTPATIENT
Start: 2022-04-01

## 2022-04-01 RX ORDER — METOCLOPRAMIDE 5 MG/1
5 TABLET ORAL 3 TIMES DAILY PRN
Qty: 15 TABLET | Refills: 0 | Status: SHIPPED | OUTPATIENT
Start: 2022-04-01 | End: 2022-11-16 | Stop reason: SDUPTHER

## 2022-04-01 RX ORDER — METOCLOPRAMIDE 5 MG/1
5 TABLET ORAL 3 TIMES DAILY PRN
Qty: 15 TABLET | Refills: 0 | Status: SHIPPED | OUTPATIENT
Start: 2022-04-01 | End: 2022-04-01

## 2022-04-01 RX ORDER — CEPHALEXIN 500 MG/1
500 CAPSULE ORAL EVERY 6 HOURS
Qty: 16 CAPSULE | Refills: 0 | Status: SHIPPED | OUTPATIENT
Start: 2022-04-01 | End: 2022-04-05

## 2022-04-01 RX ORDER — OMEPRAZOLE 40 MG/1
40 CAPSULE, DELAYED RELEASE ORAL DAILY
Qty: 60 CAPSULE | Refills: 1 | Status: SHIPPED | OUTPATIENT
Start: 2022-04-01 | End: 2022-04-01

## 2022-04-01 NOTE — PROGRESS NOTES
04/01/2022    Macie Guzman  [id number]    CC: indigestion/back pain    HPI    Feels like food is not digesting and has to induce vomiting  present for the last 2 days  Does not take rx GERD med    Lower abd pain/back pain  Started 2 days ago  Wonders if it is related to tubal  Endorses increased urinary frequency but not dysuria      Negative 10 point ROS outside of HPI    Social History     Socioeconomic History    Marital status: Single   Tobacco Use    Smoking status: Never Smoker    Smokeless tobacco: Never Used   Substance and Sexual Activity    Alcohol use: No    Drug use: No    Sexual activity: Yes     Partners: Male     Birth control/protection: None           Current Outpatient Medications:     amLODIPine (NORVASC) 2.5 MG tablet, Take 1 tablet (2.5 mg total) by mouth once daily., Disp: 30 tablet, Rfl: 0    aspirin 81 MG Chew, Take 1 tablet (81 mg total) by mouth once daily., Disp: 30 tablet, Rfl: 0    atorvastatin (LIPITOR) 40 MG tablet, TAKE 1 TABLET(40 MG) BY MOUTH EVERY DAY (Patient not taking: Reported on 4/1/2022), Disp: 30 tablet, Rfl: 0    famotidine (PEPCID) 20 MG tablet, Take 1 tablet (20 mg total) by mouth 2 (two) times daily. (Patient not taking: Reported on 4/1/2022), Disp: 20 tablet, Rfl: 0    ibuprofen (ADVIL,MOTRIN) 800 MG tablet, Take 1 tablet (800 mg total) by mouth 3 (three) times daily as needed for Pain. (Patient not taking: No sig reported), Disp: 30 tablet, Rfl: 0    omeprazole (PRILOSEC) 20 MG capsule, Take 1 capsule (20 mg total) by mouth once daily. (Patient not taking: Reported on 4/1/2022), Disp: 30 capsule, Rfl: 11    ondansetron (ZOFRAN-ODT) 4 MG TbDL, Take 1 tablet (4 mg total) by mouth every 6 (six) hours as needed (nausea). (Patient not taking: No sig reported), Disp: 10 tablet, Rfl: 0      Physical Exam  Vitals:    04/01/22 0941   BP: 124/72   Pulse: 91   Temp: 98.3 °F (36.8 °C)       Gen: well appearing, NAD  Resp: non labored breathing, no crackles, no  wheezes, CTAB  CV: RRR no murmur, gallops, rubs, no LE edema  Abd: soft bilateral CVA tenderness, suprapubic tenderness  Back: generalized tenderness    1. Urinary frequency  - POCT URINE DIPSTICK WITHOUT MICROSCOPE    2. Gastroesophageal reflux disease, unspecified whether esophagitis present  - famotidine (PEPCID) 20 MG tablet; Take 1 tablet (20 mg total) by mouth 2 (two) times daily.  Dispense: 60 tablet; Refill: 1  - omeprazole (PRILOSEC) 40 MG capsule; Take 1 capsule (40 mg total) by mouth once daily.  Dispense: 60 capsule; Refill: 1    3. Nausea  - metoclopramide HCl (REGLAN) 5 MG tablet; Take 1 tablet (5 mg total) by mouth 3 (three) times daily as needed (for nausea).  Dispense: 15 tablet; Refill: 0    4. Acute cystitis without hematuria  - cephALEXin (KEFLEX) 500 MG capsule; Take 1 capsule (500 mg total) by mouth every 6 (six) hours. for 4 days  Dispense: 16 capsule; Refill: 0  - Urine culture      RTC if worsening or no improvement in 1-2 weeks      Chiquis Wilkinson MD  Family Medicine

## 2022-04-02 LAB — BACTERIA UR CULT: NORMAL

## 2022-05-10 ENCOUNTER — OFFICE VISIT (OUTPATIENT)
Dept: URGENT CARE | Facility: CLINIC | Age: 32
End: 2022-05-10
Payer: COMMERCIAL

## 2022-05-10 VITALS
HEIGHT: 61 IN | HEART RATE: 83 BPM | BODY MASS INDEX: 41.54 KG/M2 | OXYGEN SATURATION: 99 % | DIASTOLIC BLOOD PRESSURE: 90 MMHG | RESPIRATION RATE: 17 BRPM | SYSTOLIC BLOOD PRESSURE: 143 MMHG | WEIGHT: 220 LBS | TEMPERATURE: 99 F

## 2022-05-10 DIAGNOSIS — R05.9 COUGH: ICD-10-CM

## 2022-05-10 DIAGNOSIS — U07.1 COVID-19: Primary | ICD-10-CM

## 2022-05-10 DIAGNOSIS — U07.1 COVID-19 VIRUS DETECTED: ICD-10-CM

## 2022-05-10 LAB
CTP QC/QA: YES
SARS-COV-2 RDRP RESP QL NAA+PROBE: POSITIVE

## 2022-05-10 PROCEDURE — 1159F MED LIST DOCD IN RCRD: CPT | Mod: CPTII,S$GLB,,

## 2022-05-10 PROCEDURE — 99214 OFFICE O/P EST MOD 30 MIN: CPT | Mod: S$GLB,,,

## 2022-05-10 PROCEDURE — 3077F PR MOST RECENT SYSTOLIC BLOOD PRESSURE >= 140 MM HG: ICD-10-PCS | Mod: CPTII,S$GLB,,

## 2022-05-10 PROCEDURE — 71046 X-RAY EXAM CHEST 2 VIEWS: CPT | Mod: S$GLB,,, | Performed by: RADIOLOGY

## 2022-05-10 PROCEDURE — 3008F BODY MASS INDEX DOCD: CPT | Mod: CPTII,S$GLB,,

## 2022-05-10 PROCEDURE — 99214 PR OFFICE/OUTPT VISIT, EST, LEVL IV, 30-39 MIN: ICD-10-PCS | Mod: S$GLB,,,

## 2022-05-10 PROCEDURE — 71046 XR CHEST PA AND LATERAL: ICD-10-PCS | Mod: S$GLB,,, | Performed by: RADIOLOGY

## 2022-05-10 PROCEDURE — 1160F PR REVIEW ALL MEDS BY PRESCRIBER/CLIN PHARMACIST DOCUMENTED: ICD-10-PCS | Mod: CPTII,S$GLB,,

## 2022-05-10 PROCEDURE — 1159F PR MEDICATION LIST DOCUMENTED IN MEDICAL RECORD: ICD-10-PCS | Mod: CPTII,S$GLB,,

## 2022-05-10 PROCEDURE — U0002: ICD-10-PCS | Mod: QW,S$GLB,,

## 2022-05-10 PROCEDURE — 3077F SYST BP >= 140 MM HG: CPT | Mod: CPTII,S$GLB,,

## 2022-05-10 PROCEDURE — U0002 COVID-19 LAB TEST NON-CDC: HCPCS | Mod: QW,S$GLB,,

## 2022-05-10 PROCEDURE — 3008F PR BODY MASS INDEX (BMI) DOCUMENTED: ICD-10-PCS | Mod: CPTII,S$GLB,,

## 2022-05-10 PROCEDURE — 3080F PR MOST RECENT DIASTOLIC BLOOD PRESSURE >= 90 MM HG: ICD-10-PCS | Mod: CPTII,S$GLB,,

## 2022-05-10 PROCEDURE — 1160F RVW MEDS BY RX/DR IN RCRD: CPT | Mod: CPTII,S$GLB,,

## 2022-05-10 PROCEDURE — 3080F DIAST BP >= 90 MM HG: CPT | Mod: CPTII,S$GLB,,

## 2022-05-10 RX ORDER — BENZONATATE 100 MG/1
200 CAPSULE ORAL 3 TIMES DAILY PRN
Qty: 60 CAPSULE | Refills: 0 | Status: SHIPPED | OUTPATIENT
Start: 2022-05-10

## 2022-05-10 RX ORDER — PROMETHAZINE HYDROCHLORIDE AND DEXTROMETHORPHAN HYDROBROMIDE 6.25; 15 MG/5ML; MG/5ML
5 SYRUP ORAL NIGHTLY PRN
Qty: 118 ML | Refills: 0 | Status: SHIPPED | OUTPATIENT
Start: 2022-05-10 | End: 2022-05-17

## 2022-05-10 NOTE — PROGRESS NOTES
"Subjective:       Patient ID: Macie Guzman is a 31 y.o. female.    Vitals:  height is 5' 1" (1.549 m) and weight is 99.8 kg (220 lb). Her temperature is 98.9 °F (37.2 °C). Her blood pressure is 143/90 (abnormal) and her pulse is 83. Her respiration is 17 and oxygen saturation is 99%.     Chief Complaint: Cough    Pt Is coming in with cough and SOB. Pt says she have been having symptoms for the past couple of days. Pt says she doesn't know if she was exposed to anything. Pt is also have chest tightness, ear pain, and sore throat. Pt says she would like to be tested for covid and pneumonia.     Cough  This is a new problem. The current episode started in the past 7 days. The problem has been gradually worsening. The problem occurs constantly. The cough is non-productive. Associated symptoms include ear pain, eye redness, a fever, postnasal drip, a sore throat, shortness of breath and wheezing. Pertinent negatives include no chest pain or chills. Nothing aggravates the symptoms. She has tried OTC cough suppressant (breathing treatment ) for the symptoms. The treatment provided no relief. There is no history of asthma.       Constitution: Positive for fever. Negative for chills, sweating and fatigue.   HENT: Positive for ear pain, postnasal drip, sinus pressure and sore throat. Negative for congestion.    Cardiovascular: Negative for chest pain.   Eyes: Positive for eye redness.   Respiratory: Positive for chest tightness, cough, shortness of breath and wheezing. Negative for asthma.    Allergic/Immunologic: Negative for asthma.       Objective:      Physical Exam   Constitutional: She is oriented to person, place, and time. She appears well-developed. She is cooperative.  Non-toxic appearance. She does not appear ill. No distress.      Comments:Patient well appearing in no acute distress. Able to talk in complete sentences without difficulty or pause. O2 sat 99% RA     HENT:   Head: Normocephalic and atraumatic. "   Ears:   Right Ear: Hearing, tympanic membrane, external ear and ear canal normal.   Left Ear: Hearing, tympanic membrane, external ear and ear canal normal.   Nose: Nose normal. No mucosal edema, rhinorrhea or nasal deformity. No epistaxis. Right sinus exhibits no maxillary sinus tenderness and no frontal sinus tenderness. Left sinus exhibits no maxillary sinus tenderness and no frontal sinus tenderness.   Mouth/Throat: Uvula is midline, oropharynx is clear and moist and mucous membranes are normal. No trismus in the jaw. Normal dentition. No uvula swelling. No oropharyngeal exudate, posterior oropharyngeal edema or posterior oropharyngeal erythema.   Eyes: Conjunctivae and lids are normal. No scleral icterus.   Neck: Trachea normal and phonation normal. Neck supple. No edema present. No erythema present. No neck rigidity present.   Cardiovascular: Normal rate, regular rhythm, normal heart sounds and normal pulses.   Pulmonary/Chest: Effort normal and breath sounds normal. No respiratory distress. She has no decreased breath sounds. She has no rhonchi.   Abdominal: Normal appearance.   Musculoskeletal: Normal range of motion.         General: No deformity. Normal range of motion.   Neurological: She is alert and oriented to person, place, and time. She exhibits normal muscle tone. Coordination normal.   Skin: Skin is warm, dry, intact, not diaphoretic and not pale.   Psychiatric: Her speech is normal and behavior is normal. Judgment and thought content normal.   Nursing note and vitals reviewed.          Results for orders placed or performed in visit on 05/10/22   POCT COVID-19 Rapid Screening   Result Value Ref Range    POC Rapid COVID Positive (A) Negative     Acceptable Yes      2      XR CHEST PA AND LATERAL    Result Date: 5/10/2022  EXAMINATION: XR CHEST PA AND LATERAL CLINICAL HISTORY: Cough, unspecified TECHNIQUE: PA and lateral views of the chest were performed. COMPARISON: 01/25/2022.  FINDINGS: The trachea is unremarkable.  The cardiomediastinal silhouette is within normal limits.  The diaphragms are unremarkable.  The there are no pleural effusions.  There is no evidence of pneumothorax.  There is no evidence of pneumomediastinum.  No airspace opacity is present.  The osseous structures are unremarkable.     No acute process. Electronically signed by: Gómez Preston MD Date:    05/10/2022 Time:    19:30    Assessment:       1. COVID-19    2. Cough          Plan:         Reviewed Positive Covid test with patient who verbalized understanding.  Patient informed that her symptoms are indicative of a viral illness which is treated symptomatically.  We discussed over the counter treatment for this condition. Patient educational handouts also included in discharge paperwork and given to patient who verbalized understanding and agrees with plan of care.  She denies any further questions or concerns at this time.  Patient exits exam room in no acute distress.    Discussed results with patient and proper quarantine based on CDC guidelines.   Discussed use of OTC medications for symptom control as this is a viral disease.   All ER precautions covered including but not limited to shortness of breath, intractable fever, or chest pain.  Discussed RTC if symptoms worsen, change, or persist.     Patient verbalized understanding and agreed with the plan.     Carly Brooks PA-C        COVID-19    Cough  -     POCT COVID-19 Rapid Screening  -     XR CHEST PA AND LATERAL; Future; Expected date: 05/10/2022  -     promethazine-dextromethorphan (PROMETHAZINE-DM) 6.25-15 mg/5 mL Syrp; Take 5 mLs by mouth nightly as needed (cough).  Dispense: 118 mL; Refill: 0  -     benzonatate (TESSALON PERLES) 100 MG capsule; Take 2 capsules (200 mg total) by mouth 3 (three) times daily as needed for Cough.  Dispense: 60 capsule; Refill: 0         Patient Instructions   You have tested positive for COVID-19 today.       ISOLATION  If you tested positive and do not have symptoms, you must isolate for 5 days starting on the day of the positive test. I    If you tested positive and have symptoms, you must isolate for 5 days starting on the day of the first symptoms,  not the day of the positive test.     This is the most important part, both the CDC and the [facility] emphasize that you do not test out of isolation.     After 5 days, if your symptoms have improved and you have not had fever on day 5, you can return to the community on day 6- NO TESTING REQUIRED!      In fact, we do not retest if you were positive in the last 90 days.    After your 5 days of isolation are completed, the CDC recommends strict mask use for the first 5 days that you come out of isolation.     Return to Urgent Care or go to ER if symptoms worsen or fail to improve.  Follow up with PCP as recommended for further management.     Your symptoms should begin to improve by Day 5 of the infection-- if symptoms worsen, you develop a new fever, shortness of breath, worsening shortness of breath with activity, chest pain, worsening cough, you must return to Urgent Care or go to the ER.    PLEASE READ YOUR DISCHARGE INSTRUCTIONS ENTIRELY AS IT CONTAINS IMPORTANT INFORMATION.      Please drink plenty of fluids.    Please get plenty of rest.    Please return here or go to the Emergency Department for any concerns or worsening of condition.      Tylenol or ibuprofen can also be used as directed for pain and fever unless you have an allergy to them or medical condition such as stomach ulcers, kidney or liver disease or blood thinners etc for which you should not be taking these type of medications.   YOU CAN ALTERNATE TYLENOL AND IBUPROFEN EVERY 3-4 HOURS. Take 1000mg (2 pills) of Extra Strength Acetaminophen (Tylenol) every 6 hours and 600mg (3 pills) of Ibuprofen (Motrin/Advil) every 6 hours, alternating the two so that every 3 hours you take one or the other. Start with  the Tylenol, then 3 hours later take the Ibuprofen, then 3 hours later take the Tylenol again, and so on.         For your allergy symptoms and/or runny nose, sinus/ear pressure, congestion:        - You can take over-the-counter claritin, zyrtec, allegra, or xyzal as directed. These are antihistamines that can help with runny nose, nasal congestion, sneezing, and helps to dry up post-nasal drip, which usually causes sore throat and cough.               - If you do NOT have high blood pressure, you may use a decongestant form (D)  of this medication (ie. Claritin- D, zyrtec-D, allegra-D) or if you do not take the D form, you can take sudafed (pseudoephedrine) over the counter, which is a decongestant. Do NOT take two decongestant (D) medications at the same time (such as mucinex-D and claritin-D or plain sudafed and claritin D). Dextromethorphan (DM) is a cough suppressant over the counter (ie. mucinex DM, robitussin, delsym; dayquil/nyquil has DM as well.)    -If you DO have high blood pressure, AVOID DECONGESTANTS (I.e., Phenylephrine and Pseudoephedrine). You may take Coricidin HBP for sinus congestion and Delsym for cough.        - You can take plain Mucinex (guaifenesin) 1200 mg twice a day to help loosen mucous.       Use over the counter flonase or nasocort: one spray each nostril twice daily OR two sprays each nostril once daily until nares dry out, unless you have Glaucoma.   If you find this dries your nose out or your nose bleeds, try using over the counter nasal saline a few minutes prior to using the flonase to moisten the lining of your nose and throughout the day as needed.   Flonase/nasal spray use directions:  1) Use once per day.  2) Blow nose first.  3) Close one nostril and spray flonase up the other nostril while inhaling gently.   4) If you inhale to aggressively, you will have a bitter taste in the back of your mouth.       You can try breathe right strips at night to help you breathe.  A cool  mist humidifier in bedroom may help with cough and relieve stuffy nose.     Sinus rinses DO NOT USE TAP WATER, if you must, water must be a rolling boil for 1 minute, let it cool, then use.  May use distilled water, or over the counter nasal saline rinses.  Vics vapor rub in shower to help open nasal passages.  May use nasal gel to keep passages moisturized.  May use Nasal saline sprays during the day for added relief of congestion.   For those who go to the gym, please do not use the sauna or steam room now to clear sinuses.      Sore throat recommendations: Warm fluids, warm salt water gargles, throat lozenges, tea, honey, soup, rest, hydration.      Cough     Honey with nait to soothe your throat    Robitussin or Delsyum for cough suppressant for dry cough.    Mucinex DM or products containing Guaifenesin or Dextromethorphan for expectorant (wet cough).    Take prescription cough meds (pills) as prescribed; take prescription cough syrup at night as needed for cough.  Do not take both the prescribed cough pills and syrup at the same time or within 6 hours of each other.  Do not take the cough syrup with any other sedative medication as it can can cause drowsiness. Do not operate any heavy machinery, drink or drive while taking the cough syrup.    Try taking half a dose first of the cough syrup to see how it affects you.       Please follow up with your primary care doctor or specialist in the next 48-72hrs as needed and if no improvement    If you  smoke, please stop smoking.      Please return or see your primary care doctor if you develop new or worsening symptoms.     Please arrange follow up with your primary medical clinic as soon as possible. You must understand that you've received an Urgent Care treatment only and that you may be released before all of your medical problems are known or treated. You, the patient, will arrange for follow up as instructed. If your symptoms worsen or fail to improve you  should go to the Emergency Room.

## 2022-05-10 NOTE — LETTER
May 10, 2022      [facility]  [address], [address] [place] [address]  Phone: [phone]  Fax: [phone]       Patient: Macie Guzman   YOB: 1990  Date of Visit: 05/10/2022    To Whom It May Concern:    Nathalie Guzman  was at [facility] on 05/10/2022. The patient may return to work/school on 5/14/2022 with no restrictions OR sooner IF fever free for 24 hours (fever is 100.4F or greater) and symptoms are improving.  . If you have any questions or concerns, or if I can be of further assistance, please do not hesitate to contact me.    Sincerely,          Carly Brooks PA-C

## 2022-05-11 NOTE — PATIENT INSTRUCTIONS
You have tested positive for COVID-19 today.      ISOLATION  If you tested positive and do not have symptoms, you must isolate for 5 days starting on the day of the positive test. I    If you tested positive and have symptoms, you must isolate for 5 days starting on the day of the first symptoms,  not the day of the positive test.     This is the most important part, both the CDC and the [facility] emphasize that you do not test out of isolation.     After 5 days, if your symptoms have improved and you have not had fever on day 5, you can return to the community on day 6- NO TESTING REQUIRED!      In fact, we do not retest if you were positive in the last 90 days.    After your 5 days of isolation are completed, the CDC recommends strict mask use for the first 5 days that you come out of isolation.    Return to Urgent Care or go to ER if symptoms worsen or fail to improve.  Follow up with PCP as recommended for further management.     Your symptoms should begin to improve by Day 5 of the infection-- if symptoms worsen, you develop a new fever, shortness of breath, worsening shortness of breath with activity, chest pain, worsening cough, you must return to Urgent Care or go to the ER.    PLEASE READ YOUR DISCHARGE INSTRUCTIONS ENTIRELY AS IT CONTAINS IMPORTANT INFORMATION.      Please drink plenty of fluids.    Please get plenty of rest.    Please return here or go to the Emergency Department for any concerns or worsening of condition.      Tylenol or ibuprofen can also be used as directed for pain and fever unless you have an allergy to them or medical condition such as stomach ulcers, kidney or liver disease or blood thinners etc for which you should not be taking these type of medications.   YOU CAN ALTERNATE TYLENOL AND IBUPROFEN EVERY 3-4 HOURS. Take 1000mg (2 pills) of Extra Strength Acetaminophen (Tylenol) every 6 hours and 600mg (3 pills) of Ibuprofen (Motrin/Advil) every 6 hours, alternating the two so that every 3  hours you take one or the other. Start with the Tylenol, then 3 hours later take the Ibuprofen, then 3 hours later take the Tylenol again, and so on.         For your allergy symptoms and/or runny nose, sinus/ear pressure, congestion:        - You can take over-the-counter claritin, zyrtec, allegra, or xyzal as directed. These are antihistamines that can help with runny nose, nasal congestion, sneezing, and helps to dry up post-nasal drip, which usually causes sore throat and cough.               - If you do NOT have high blood pressure, you may use a decongestant form (D)  of this medication (ie. Claritin- D, zyrtec-D, allegra-D) or if you do not take the D form, you can take sudafed (pseudoephedrine) over the counter, which is a decongestant. Do NOT take two decongestant (D) medications at the same time (such as mucinex-D and claritin-D or plain sudafed and claritin D). Dextromethorphan (DM) is a cough suppressant over the counter (ie. mucinex DM, robitussin, delsym; dayquil/nyquil has DM as well.)    -If you DO have high blood pressure, AVOID DECONGESTANTS (I.e., Phenylephrine and Pseudoephedrine). You may take Coricidin HBP for sinus congestion and Delsym for cough.        - You can take plain Mucinex (guaifenesin) 1200 mg twice a day to help loosen mucous.       Use over the counter flonase or nasocort: one spray each nostril twice daily OR two sprays each nostril once daily until nares dry out, unless you have Glaucoma.   If you find this dries your nose out or your nose bleeds, try using over the counter nasal saline a few minutes prior to using the flonase to moisten the lining of your nose and throughout the day as needed.   Flonase/nasal spray use directions:  1) Use once per day.  2) Blow nose first.  3) Close one nostril and spray flonase up the other nostril while inhaling gently.   4) If you inhale to aggressively, you will have a bitter taste in the back of your mouth.       You can try breathe right  strips at night to help you breathe.  A cool mist humidifier in bedroom may help with cough and relieve stuffy nose.     Sinus rinses DO NOT USE TAP WATER, if you must, water must be a rolling boil for 1 minute, let it cool, then use.  May use distilled water, or over the counter nasal saline rinses.  Vics vapor rub in shower to help open nasal passages.  May use nasal gel to keep passages moisturized.  May use Nasal saline sprays during the day for added relief of congestion.   For those who go to the gym, please do not use the sauna or steam room now to clear sinuses.      Sore throat recommendations: Warm fluids, warm salt water gargles, throat lozenges, tea, honey, soup, rest, hydration.      Cough     Honey with nati to soothe your throat    Robitussin or Delsyum for cough suppressant for dry cough.    Mucinex DM or products containing Guaifenesin or Dextromethorphan for expectorant (wet cough).    Take prescription cough meds (pills) as prescribed; take prescription cough syrup at night as needed for cough.  Do not take both the prescribed cough pills and syrup at the same time or within 6 hours of each other.  Do not take the cough syrup with any other sedative medication as it can can cause drowsiness. Do not operate any heavy machinery, drink or drive while taking the cough syrup.    Try taking half a dose first of the cough syrup to see how it affects you.       Please follow up with your primary care doctor or specialist in the next 48-72hrs as needed and if no improvement    If you  smoke, please stop smoking.      Please return or see your primary care doctor if you develop new or worsening symptoms.     Please arrange follow up with your primary medical clinic as soon as possible. You must understand that you've received an Urgent Care treatment only and that you may be released before all of your medical problems are known or treated. You, the patient, will arrange for follow up as instructed. If  your symptoms worsen or fail to improve you should go to the Emergency Room.

## 2022-06-27 ENCOUNTER — OFFICE VISIT (OUTPATIENT)
Dept: PRIMARY CARE CLINIC | Facility: CLINIC | Age: 32
End: 2022-06-27
Payer: COMMERCIAL

## 2022-06-27 DIAGNOSIS — N30.00 ACUTE CYSTITIS WITHOUT HEMATURIA: Primary | ICD-10-CM

## 2022-06-27 PROCEDURE — 1159F MED LIST DOCD IN RCRD: CPT | Mod: CPTII,95,, | Performed by: STUDENT IN AN ORGANIZED HEALTH CARE EDUCATION/TRAINING PROGRAM

## 2022-06-27 PROCEDURE — 1160F RVW MEDS BY RX/DR IN RCRD: CPT | Mod: CPTII,95,, | Performed by: STUDENT IN AN ORGANIZED HEALTH CARE EDUCATION/TRAINING PROGRAM

## 2022-06-27 PROCEDURE — 99214 PR OFFICE/OUTPT VISIT, EST, LEVL IV, 30-39 MIN: ICD-10-PCS | Mod: 95,,, | Performed by: STUDENT IN AN ORGANIZED HEALTH CARE EDUCATION/TRAINING PROGRAM

## 2022-06-27 PROCEDURE — 1159F PR MEDICATION LIST DOCUMENTED IN MEDICAL RECORD: ICD-10-PCS | Mod: CPTII,95,, | Performed by: STUDENT IN AN ORGANIZED HEALTH CARE EDUCATION/TRAINING PROGRAM

## 2022-06-27 PROCEDURE — 99214 OFFICE O/P EST MOD 30 MIN: CPT | Mod: 95,,, | Performed by: STUDENT IN AN ORGANIZED HEALTH CARE EDUCATION/TRAINING PROGRAM

## 2022-06-27 PROCEDURE — 1160F PR REVIEW ALL MEDS BY PRESCRIBER/CLIN PHARMACIST DOCUMENTED: ICD-10-PCS | Mod: CPTII,95,, | Performed by: STUDENT IN AN ORGANIZED HEALTH CARE EDUCATION/TRAINING PROGRAM

## 2022-06-27 RX ORDER — FLUCONAZOLE 150 MG/1
TABLET ORAL
Qty: 2 TABLET | Refills: 0 | Status: SHIPPED | OUTPATIENT
Start: 2022-06-27

## 2022-06-27 RX ORDER — NITROFURANTOIN 25; 75 MG/1; MG/1
100 CAPSULE ORAL 2 TIMES DAILY
Qty: 10 CAPSULE | Refills: 0 | Status: SHIPPED | OUTPATIENT
Start: 2022-06-27 | End: 2022-07-02

## 2022-06-27 RX ORDER — PHENAZOPYRIDINE HYDROCHLORIDE 100 MG/1
100 TABLET, FILM COATED ORAL 3 TIMES DAILY PRN
Qty: 6 TABLET | Refills: 0 | Status: SHIPPED | OUTPATIENT
Start: 2022-06-27 | End: 2022-06-29

## 2022-06-27 NOTE — PATIENT INSTRUCTIONS
UTI:   - advise starting on Abx, Nitrofurantoin 100mg BID for 5 days.   - also treat with Pyridium TID for 2 days of bladder pain.   - Advise taking probiotic while on antibiotics.   - Also advised to take Fluconazole 150mg if getting itching/dischage, then take 2nd tab if not better in 3 days.

## 2022-06-27 NOTE — PROGRESS NOTES
The patient location is: [place]  The chief complaint leading to consultation is: UTI    Visit type: audiovisual    Face to Face time with patient: 15  20 minutes of total time spent on the encounter, which includes face to face time and non-face to face time preparing to see the patient (eg, review of tests), Obtaining and/or reviewing separately obtained history, Documenting clinical information in the electronic or other health record, Independently interpreting results (not separately reported) and communicating results to the patient/family/caregiver, or Care coordination (not separately reported).         Each patient to whom he or she provides medical services by telemedicine is:  (1) informed of the relationship between the physician and patient and the respective role of any other health care provider with respect to management of the patient; and (2) notified that he or she may decline to receive medical services by telemedicine and may withdraw from such care at any time.    Notes:       32 yo female with UTI symptoms including dysuria and urgency.    Had tied Azo with some improvement, but did not resolve.  Denies N/V.  No discharge.  No fevers or chills.     Patient is not prengant.     Physical Exam  Constitutional:       General: She is not in acute distress.     Appearance: Normal appearance.   HENT:      Right Ear: External ear normal.      Left Ear: External ear normal.      Nose: No rhinorrhea.   Eyes:      Extraocular Movements: Extraocular movements intact.   Pulmonary:      Effort: Pulmonary effort is normal. No respiratory distress.   Neurological:      Mental Status: She is alert and oriented to person, place, and time.   Psychiatric:         Mood and Affect: Mood normal.         Behavior: Behavior normal.         UTI:   - advise starting on Abx, Nitrofurantoin 100mg BID for 5 days.   - also treat with Pyridium TID for 2 days of bladder pain.   - Advise taking probiotic while on  antibiotics.   - Also advised to take Fluconazole 150mg if getting itching/dischage, then take 2nd tab if not better in 3 days.

## 2022-11-16 PROBLEM — R11.0 NAUSEA: Status: ACTIVE | Noted: 2022-11-16

## 2022-11-16 PROBLEM — M79.10 MYALGIA: Status: ACTIVE | Noted: 2022-11-16

## 2022-11-16 PROBLEM — M79.602 LEFT ARM PAIN: Status: ACTIVE | Noted: 2022-11-16

## 2023-04-06 NOTE — Clinical Note
"Macie Crawford" Megan was seen and treated in our emergency department on 1/25/2022.  She may return to work on 01/28/2022.       If you have any questions or concerns, please don't hesitate to call.      Mirna Gonzalez PA-C"
Labs/Medications

## 2023-06-26 ENCOUNTER — NURSE TRIAGE (OUTPATIENT)
Dept: ADMINISTRATIVE | Facility: CLINIC | Age: 33
End: 2023-06-26
Payer: MEDICAID

## 2023-06-26 NOTE — TELEPHONE ENCOUNTER
OOC Rn  Patient calling.  About 6/21/23 sprained right angle.  Why is my hip hurtint?   I am taking the meds as instructed.  , and to follow up with orthopaedics.  Refused triaged.   Wanted to go back to  to get appt.     Reason for Disposition   Information only question and nurse able to answer    Additional Information   Negative: Nursing judgment   Negative: Nursing judgment   Negative: Nursing judgment   Negative: Nursing judgment    Protocols used: Information Only Call - No Triage-A-OH

## 2023-07-24 ENCOUNTER — TELEPHONE (OUTPATIENT)
Dept: PRIMARY CARE CLINIC | Facility: CLINIC | Age: 33
End: 2023-07-24

## 2023-07-24 ENCOUNTER — E-VISIT (OUTPATIENT)
Dept: PRIMARY CARE CLINIC | Facility: CLINIC | Age: 33
End: 2023-07-24
Payer: MEDICAID

## 2023-07-24 DIAGNOSIS — I10 ESSENTIAL HYPERTENSION: Primary | ICD-10-CM

## 2023-07-24 PROCEDURE — 99499 NO LOS: ICD-10-PCS | Mod: 95,,, | Performed by: STUDENT IN AN ORGANIZED HEALTH CARE EDUCATION/TRAINING PROGRAM

## 2023-07-24 PROCEDURE — 99499 UNLISTED E&M SERVICE: CPT | Mod: 95,,, | Performed by: STUDENT IN AN ORGANIZED HEALTH CARE EDUCATION/TRAINING PROGRAM

## 2023-07-24 NOTE — PROGRESS NOTES
Dear Macie,     In reviewing your history of the current problem, you will need to address it by seeing you in person in the clinic.  You have not been seen in our clinic in over a year, BP is not a virtual appt option, and you are not on our patient panel which is required for a virtual visit.    I will forward to my staff to cancel this E-Visit and provide you the number for [facility].     Dr. Ga

## 2023-07-24 NOTE — TELEPHONE ENCOUNTER
----- Message from Jimmy Ga MD sent at 7/24/2023  8:01 AM CDT -----    Dear Macie,     In reviewing your history of the current problem, you will need to address it by seeing you in person in the clinic.  You have not been seen in our clinic in over a year, BP is not a virtual appt option, and you are not on our patient panel which is required for a virtual visit.    I will forward to my staff to cancel this E-Visit and provide you the number for [facility].     Dr. Ga

## 2023-07-24 NOTE — Clinical Note
Dear Macie,   In reviewing your history of the current problem, you will need to address it by seeing you in person in the clinic.  You have not been seen in our clinic in over a year, BP is not a virtual appt option, and you are not on our patient panel which is required for a virtual visit.  I will forward to my staff to cancel this E-Visit and provide you the number for [facility].   Dr. Ga

## 2023-07-24 NOTE — PATIENT INSTRUCTIONS
Dear Macie,     In reviewing your history of the current problem, you will need to address it by seeing you in person in the clinic.  You have not been seen in our clinic in over a year, BP is not a virtual appt option, and you are not on our patient panel which is required for a virtual visit.    I will forward to my staff to cancel this E-Visit and provide you the number for [facility].     Dr. Ga

## 2023-07-25 ENCOUNTER — NURSE TRIAGE (OUTPATIENT)
Dept: ADMINISTRATIVE | Facility: CLINIC | Age: 33
End: 2023-07-25
Payer: MEDICAID

## 2023-07-25 ENCOUNTER — OFFICE VISIT (OUTPATIENT)
Dept: FAMILY MEDICINE | Facility: CLINIC | Age: 33
End: 2023-07-25
Attending: FAMILY MEDICINE
Payer: MEDICAID

## 2023-07-25 DIAGNOSIS — Z53.21 PROCEDURE AND TREATMENT NOT CARRIED OUT DUE TO PATIENT LEAVING PRIOR TO BEING SEEN BY HEALTH CARE PROVIDER: Primary | ICD-10-CM

## 2023-07-25 PROCEDURE — 99499 UNLISTED E&M SERVICE: CPT | Mod: 95,,, | Performed by: FAMILY MEDICINE

## 2023-07-25 PROCEDURE — 99499 NO LOS: ICD-10-PCS | Mod: 95,,, | Performed by: FAMILY MEDICINE

## 2023-07-25 NOTE — TELEPHONE ENCOUNTER
"Patient transferred from patient access. Patient C/O chest pain, back and shoulder. Constant pain, feels like " someone is thumping me". Patient has HTN. Triage done- 911 advised. Verb understanding. Reason for Disposition   [1] Chest pain lasts > 5 minutes AND [2] age > 30 AND [3] one or more cardiac risk factors (e.g., diabetes, high blood pressure, high cholesterol, smoker, or strong family history of heart disease)    Additional Information   Negative: SEVERE difficulty breathing (e.g., struggling for each breath, speaks in single words)   Negative: Difficult to awaken or acting confused (e.g., disoriented, slurred speech)   Negative: Shock suspected (e.g., cold/pale/clammy skin, too weak to stand, low BP, rapid pulse)   Negative: Passed out (i.e., lost consciousness, collapsed and was not responding)   Negative: [1] Chest pain lasts > 5 minutes AND [2] age > 44    Protocols used: Chest Pain-A-AH    "

## 2023-07-29 PROCEDURE — 93010 EKG 12-LEAD: ICD-10-PCS | Mod: ,,, | Performed by: INTERNAL MEDICINE

## 2023-07-29 PROCEDURE — 93010 ELECTROCARDIOGRAM REPORT: CPT | Mod: ,,, | Performed by: INTERNAL MEDICINE

## 2023-07-29 PROCEDURE — 93005 ELECTROCARDIOGRAM TRACING: CPT

## 2023-07-29 PROCEDURE — 99283 EMERGENCY DEPT VISIT LOW MDM: CPT

## 2023-07-30 ENCOUNTER — HOSPITAL ENCOUNTER (EMERGENCY)
Facility: HOSPITAL | Age: 33
Discharge: HOME | End: 2023-07-30
Attending: EMERGENCY MEDICINE
Payer: MEDICAID

## 2023-07-30 VITALS
TEMPERATURE: 98 F | DIASTOLIC BLOOD PRESSURE: 89 MMHG | HEART RATE: 78 BPM | RESPIRATION RATE: 16 BRPM | SYSTOLIC BLOOD PRESSURE: 155 MMHG | OXYGEN SATURATION: 100 %

## 2023-07-30 DIAGNOSIS — R09.A2 GLOBUS SENSATION: Primary | ICD-10-CM

## 2023-07-30 DIAGNOSIS — R07.9 CHEST PAIN: ICD-10-CM

## 2023-07-30 LAB
GROUP A STREP, MOLECULAR: NEGATIVE
SARS-COV-2 RDRP RESP QL NAA+PROBE: NEGATIVE

## 2023-07-30 PROCEDURE — U0002 COVID-19 LAB TEST NON-CDC: HCPCS | Performed by: EMERGENCY MEDICINE

## 2023-07-30 PROCEDURE — 87651 STREP A DNA AMP PROBE: CPT | Performed by: EMERGENCY MEDICINE

## 2023-07-30 NOTE — ED PROVIDER NOTES
Encounter Date: 7/29/2023       History     Chief Complaint   Patient presents with    Chest Pain     Pt c/o pressured 9/10 L anterior CP x 2 weeks and SOB x 1 day.     34 yo F presents w/ c/o sore throat and chest pain. The pain is intermittent, sharp, and L sided. It is worse with lying supine. She has not difficulty breathing. She reports sore throat that she describes as a sensation of her throat closing. She has no dizziness or lightheadedness.  She has no hemoptysis or leg swelling.  She has a history of hypertension for which she takes nicardipine.      Review of patient's allergies indicates:  No Known Allergies  Past Medical History:   Diagnosis Date    Hypertension     Hypertension affecting pregnancy in second trimester 5/1/2020     Past Surgical History:   Procedure Laterality Date    POSTPARTUM LIGATION OF FALLOPIAN TUBE Bilateral 9/9/2020    Procedure: LIGATION, FALLOPIAN TUBE, POSTPARTUM;  Surgeon: KODI Talley MD;  Location: [facility] L&D;  Service: OB/GYN;  Laterality: Bilateral;     Family History   Problem Relation Age of Onset    Colon cancer Neg Hx     Eclampsia Neg Hx     Breast cancer Neg Hx     Ovarian cancer Neg Hx     Cancer Neg Hx      Social History     Tobacco Use    Smoking status: Never    Smokeless tobacco: Never   Substance Use Topics    Alcohol use: No    Drug use: No     Review of Systems  All other systems reviewed and negative except as noted in HPI    Physical Exam     Initial Vitals [07/29/23 2307]   BP Pulse Resp Temp SpO2   (!) 169/107 80 16 97.9 °F (36.6 °C) 100 %      MAP       --         Physical Exam    Nursing note and vitals reviewed.      General: AO x 3, NAD. Well nourished. Well Developed  Head: Normocephalic and Atraumatic  HEENT: PERRLA. EOMI. OP Clear  Neck: Supple, Nontender in midline.  Cardiovascular: RRR. No m/r/g. 2+ Distal Pulses  Pulm/Chest: Chest nontender. Lungs clear to auscultation. No respiratory distress.  Abdomen: Nontender. Nondistended. No  "rigidity, rebound, or guarding  MSK: extremities atraumatic x 4. Gait normal  Ext: no clubbing, cyanosis, or edema  Neuro: GCS 15. CN II-XII intact. Intact symmetric sensation, strength, DTR x 4 extremities  Skin: no bullae, petechiae, or purpura. Warm, dry, and intact.  Psych: normal mood and affect.      ED Course   Procedures  Labs Reviewed   GROUP A STREP, MOLECULAR   SARS-COV-2 RNA AMPLIFICATION, QUAL          Imaging Results    None          Medications - No data to display  Medical Decision Making:   History:   Old Medical Records: I decided to obtain old medical records.  Old Records Summarized: records from clinic visits, records from previous admission(s), records from another hospital and other records.       <> Summary of Records: Seen for chest pain on 7/25/23..  Nonischemic EKG.  Troponin negative.  D-dimer normal   Multiple past visits for chest pain  Initial Assessment:   Doubt that chest pain today represents ACS.  Patient has had multiple similar presentations over the last year with negative cardiac workups.  She has risk factors including obesity, hypertension, hyperlipidemia.  However, her presentation today is very low suspicion for ACS.  Patient reports sensation of sore throat and her throat closing.  But her oropharyngeal exam is unremarkable.  She has no evidence of stridor, upper airway edema, or anaphylaxis.  Will obtain COVID and rapid strep  Independently Interpreted Test(s):   I have ordered and independently interpreted EKG Reading(s) - see prior notes  Clinical Tests:   Medical Tests: Ordered and Reviewed  Sepsis Perfusion Assessment: "I attest a sepsis perfusion exam was performed within 6 hours of sepsis, severe sepsis, or septic shock presentation, following fluid resuscitation."  ED Management:  I do not believe that repeat troponin or D-dimer clinically indicated in this patient with chronic intermittent chest pain with repeated negative testing.  Patient already has an " outpatient referral to Cardiology as an outpatient due to multiple risk factors for development of coronary artery disease.  Considered pericarditis given her pain is worse when she lies supine.  However, patient has already been prescribed indomethacin after her most recent visit on 07/25/2023.  Given her chronic NSAID use and history of severe reflux, I counseled to continue taking her Prilosec and to take Pepcid 20 mg b.i.d. until she finishes her indomethacin as well.    Additional MDM:   Heart Score:    History:          Slightly suspicious.  ECG:             Normal  Age:               Less than 45 years  Risk factors: >= 3 risk factors or history of atherosclerotic disease  Troponin:       Less than or equal to normal limit  Final Score: 2            ED Course as of 07/30/23 0549   Sun Jul 30, 2023   0131 Rapid strep is negative.  COVID is negative. Strict return precautions and anticipatory guidance given.   [DS]      ED Course User Index  [DS] Mario Hernandez MD                 Clinical Impression:   Final diagnoses:  [R07.9] Chest pain  [R09.89] Globus sensation (Primary)        ED Disposition Condition    Discharge Stable          ED Prescriptions    None       Follow-up Information    None          Mario Hernandez MD  07/30/23 0549       Mario Hernandez MD  08/09/23 1011

## 2023-07-30 NOTE — ED NOTES
Macie Guzman, a 33 y.o. female presents to the ED w/ complaint of midsternal CP, cough, and feeling of throat closing up x2 days. Denies fever, chills, or sore throat    Triage note:  Chief Complaint   Patient presents with    Chest Pain     Pt c/o pressured 9/10 L anterior CP x 2 weeks and SOB x 1 day.     Review of patient's allergies indicates:  No Known Allergies  Past Medical History:   Diagnosis Date    Hypertension     Hypertension affecting pregnancy in second trimester 5/1/2020    Patient identifiers for Macie Guzman checked and correct.    LOC: The patient is awake, alert and aware of environment with an appropriate affect, the patient is oriented x 4 and speaking appropriately.  APPEARANCE: Patient resting comfortably and in no acute distress, patient is clean and well groomed, patient's clothing is properly fastened.  SKIN: The skin is warm and dry, color consistent with ethnicity, patient has normal skin turgor and moist mucus membranes, skin intact, no breakdown or bruising noted.  MUSCULOSKELETAL: Patient moving all extremities well, no obvious swelling or deformities noted.  RESPIRATORY: Airway is open and patent, respirations are spontaneous and even, patient has a normal effort and rate. +cough  CARDIAC: Patient has a normal rate and rhythm, no periphreal edema noted, capillary refill < 3 seconds. Normal +2 pedal pulses present. +cp  ABDOMEN: Soft and non tender to palpation, no distention noted. Patient denies any nausea, vomiting, diarrhea, or constipation.   NEUROLOGIC: Eyes open spontaneously, PERRL, behavior appropriate to situation, follows commands, facial expression symmetrical, bilateral hand grasp equal and even, purposeful motor response noted, normal sensation in all extremities.

## 2023-07-30 NOTE — DISCHARGE INSTRUCTIONS
Take over-the-counter famotidine 20 mg twice a day while you are taking the indomethacin     Take Tylenol 1000 mg every 6 hours as needed for pain     The cardiology clinic will contact you to arrange follow-up

## 2023-08-17 ENCOUNTER — NURSE TRIAGE (OUTPATIENT)
Dept: ADMINISTRATIVE | Facility: CLINIC | Age: 33
End: 2023-08-17
Payer: MEDICAID

## 2023-08-17 NOTE — TELEPHONE ENCOUNTER
Pt with upper abd pain, states above her belly button.  Care advice states to go to ED now.  Patient verbally understands, all questions answered, advised to call back for any worsening symptoms or further needs.     Reason for Disposition   [1] SEVERE pain (e.g., excruciating) AND [2] present > 1 hour    Additional Information   Negative: SEVERE difficulty breathing (e.g., struggling for each breath, speaks in single words)   Negative: Shock suspected (e.g., cold/pale/clammy skin, too weak to stand, low BP, rapid pulse)   Negative: Difficult to awaken or acting confused (e.g., disoriented, slurred speech)   Negative: Passed out (i.e., lost consciousness, collapsed and was not responding)   Negative: Visible sweat on face or sweat dripping down face   Negative: Sounds like a life-threatening emergency to the triager    Protocols used: Abdominal Pain - Upper-A-AH

## 2023-09-18 ENCOUNTER — PATIENT MESSAGE (OUTPATIENT)
Dept: PRIMARY CARE CLINIC | Facility: CLINIC | Age: 33
End: 2023-09-18
Payer: MEDICAID

## 2023-10-18 ENCOUNTER — PATIENT MESSAGE (OUTPATIENT)
Dept: CARDIOLOGY | Facility: CLINIC | Age: 33
End: 2023-10-18
Payer: MEDICAID

## 2023-11-22 ENCOUNTER — TELEPHONE (OUTPATIENT)
Dept: CARDIOLOGY | Facility: CLINIC | Age: 33
End: 2023-11-22
Payer: MEDICAID

## 2023-12-01 ENCOUNTER — HOSPITAL ENCOUNTER (EMERGENCY)
Facility: HOSPITAL | Age: 33
Discharge: HOME | End: 2023-12-01
Attending: STUDENT IN AN ORGANIZED HEALTH CARE EDUCATION/TRAINING PROGRAM
Payer: MEDICAID

## 2023-12-01 VITALS
BODY MASS INDEX: 41.57 KG/M2 | SYSTOLIC BLOOD PRESSURE: 126 MMHG | WEIGHT: 220 LBS | OXYGEN SATURATION: 98 % | RESPIRATION RATE: 16 BRPM | HEART RATE: 70 BPM | TEMPERATURE: 98 F | DIASTOLIC BLOOD PRESSURE: 88 MMHG

## 2023-12-01 DIAGNOSIS — R07.9 CHEST PAIN: ICD-10-CM

## 2023-12-01 LAB
ANION GAP SERPL CALC-SCNC: 11 MMOL/L (ref 8–16)
BASOPHILS # BLD AUTO: 0.06 K/UL (ref 0–0.2)
BASOPHILS NFR BLD: 0.6 % (ref 0–1.9)
BUN SERPL-MCNC: 12 MG/DL (ref 6–20)
CALCIUM SERPL-MCNC: 9.1 MG/DL (ref 8.7–10.5)
CHLORIDE SERPL-SCNC: 103 MMOL/L (ref 95–110)
CO2 SERPL-SCNC: 25 MMOL/L (ref 23–29)
CREAT SERPL-MCNC: 0.7 MG/DL (ref 0.5–1.4)
DIFFERENTIAL METHOD: NORMAL
EOSINOPHIL # BLD AUTO: 0.2 K/UL (ref 0–0.5)
EOSINOPHIL NFR BLD: 1.8 % (ref 0–8)
ERYTHROCYTE [DISTWIDTH] IN BLOOD BY AUTOMATED COUNT: 13 % (ref 11.5–14.5)
EST. GFR  (NO RACE VARIABLE): >60 ML/MIN/1.73 M^2
GLUCOSE SERPL-MCNC: 88 MG/DL (ref 70–110)
HCT VFR BLD AUTO: 37.3 % (ref 37–48.5)
HGB BLD-MCNC: 12.5 G/DL (ref 12–16)
IMM GRANULOCYTES # BLD AUTO: 0.03 K/UL (ref 0–0.04)
IMM GRANULOCYTES NFR BLD AUTO: 0.3 % (ref 0–0.5)
LIPASE SERPL-CCNC: 15 U/L (ref 4–60)
LYMPHOCYTES # BLD AUTO: 2.1 K/UL (ref 1–4.8)
LYMPHOCYTES NFR BLD: 22.3 % (ref 18–48)
MCH RBC QN AUTO: 28 PG (ref 27–31)
MCHC RBC AUTO-ENTMCNC: 33.5 G/DL (ref 32–36)
MCV RBC AUTO: 83 FL (ref 82–98)
MONOCYTES # BLD AUTO: 0.7 K/UL (ref 0.3–1)
MONOCYTES NFR BLD: 7.2 % (ref 4–15)
NEUTROPHILS # BLD AUTO: 6.4 K/UL (ref 1.8–7.7)
NEUTROPHILS NFR BLD: 67.8 % (ref 38–73)
NRBC BLD-RTO: 0 /100 WBC
PLATELET # BLD AUTO: 304 K/UL (ref 150–450)
PMV BLD AUTO: 10.3 FL (ref 9.2–12.9)
POTASSIUM SERPL-SCNC: 3.7 MMOL/L (ref 3.5–5.1)
RBC # BLD AUTO: 4.47 M/UL (ref 4–5.4)
SODIUM SERPL-SCNC: 139 MMOL/L (ref 136–145)
TROPONIN I SERPL DL<=0.01 NG/ML-MCNC: <0.006 NG/ML (ref 0–0.03)
WBC # BLD AUTO: 9.45 K/UL (ref 3.9–12.7)

## 2023-12-01 PROCEDURE — 84484 ASSAY OF TROPONIN QUANT: CPT | Performed by: STUDENT IN AN ORGANIZED HEALTH CARE EDUCATION/TRAINING PROGRAM

## 2023-12-01 PROCEDURE — 25000003 PHARM REV CODE 250: Performed by: STUDENT IN AN ORGANIZED HEALTH CARE EDUCATION/TRAINING PROGRAM

## 2023-12-01 PROCEDURE — 93010 ELECTROCARDIOGRAM REPORT: CPT | Mod: ,,, | Performed by: INTERNAL MEDICINE

## 2023-12-01 PROCEDURE — 93010 EKG 12-LEAD: ICD-10-PCS | Mod: ,,, | Performed by: INTERNAL MEDICINE

## 2023-12-01 PROCEDURE — 83690 ASSAY OF LIPASE: CPT | Performed by: STUDENT IN AN ORGANIZED HEALTH CARE EDUCATION/TRAINING PROGRAM

## 2023-12-01 PROCEDURE — 80048 BASIC METABOLIC PNL TOTAL CA: CPT | Performed by: STUDENT IN AN ORGANIZED HEALTH CARE EDUCATION/TRAINING PROGRAM

## 2023-12-01 PROCEDURE — 85025 COMPLETE CBC W/AUTO DIFF WBC: CPT | Performed by: STUDENT IN AN ORGANIZED HEALTH CARE EDUCATION/TRAINING PROGRAM

## 2023-12-01 PROCEDURE — 93005 ELECTROCARDIOGRAM TRACING: CPT

## 2023-12-01 PROCEDURE — 99285 EMERGENCY DEPT VISIT HI MDM: CPT | Mod: 25

## 2023-12-01 RX ORDER — SUCRALFATE 1 G/10ML
1 SUSPENSION ORAL 4 TIMES DAILY
Qty: 414 ML | Refills: 0 | Status: SHIPPED | OUTPATIENT
Start: 2023-12-01

## 2023-12-01 RX ORDER — GUAIFENESIN 100 MG/5ML
200 SOLUTION ORAL ONCE
Status: COMPLETED | OUTPATIENT
Start: 2023-12-01 | End: 2023-12-01

## 2023-12-01 RX ORDER — ONDANSETRON 4 MG/1
4 TABLET, ORALLY DISINTEGRATING ORAL
Status: COMPLETED | OUTPATIENT
Start: 2023-12-01 | End: 2023-12-01

## 2023-12-01 RX ORDER — SUCRALFATE 1 G/10ML
1 SUSPENSION ORAL
Status: COMPLETED | OUTPATIENT
Start: 2023-12-01 | End: 2023-12-01

## 2023-12-01 RX ADMIN — GUAIFENESIN 200 MG: 200 SOLUTION ORAL at 05:12

## 2023-12-01 RX ADMIN — SUCRALFATE 1 G: 1 SUSPENSION ORAL at 04:12

## 2023-12-01 RX ADMIN — ONDANSETRON 4 MG: 4 TABLET, ORALLY DISINTEGRATING ORAL at 04:12

## 2023-12-01 NOTE — ED PROVIDER NOTES
Encounter Date: 12/1/2023       History     Chief Complaint   Patient presents with    Chest Pain     Pt c/o 6/10 L anterior CP with radiation to her neck x 2 days.     HPI    34 yo F PMH GERD, HTN who presents to the ER for evaluation of chest pain.  Patient notes substernal chest pain for a few days. No inciting events.  Previous similar ED visits for CP.  Denies any dyspnea, N/V/D, abd pain, trauma, syncope, rash, or any headache.        Review of patient's allergies indicates:  No Known Allergies  Past Medical History:   Diagnosis Date    GERD (gastroesophageal reflux disease)     Hypertension     Hypertension affecting pregnancy in second trimester 05/01/2020     Past Surgical History:   Procedure Laterality Date    POSTPARTUM LIGATION OF FALLOPIAN TUBE Bilateral 9/9/2020    Procedure: LIGATION, FALLOPIAN TUBE, POSTPARTUM;  Surgeon: KODI Talley MD;  Location: [facility] L&D;  Service: OB/GYN;  Laterality: Bilateral;     Family History   Problem Relation Age of Onset    Colon cancer Neg Hx     Eclampsia Neg Hx     Breast cancer Neg Hx     Ovarian cancer Neg Hx     Cancer Neg Hx      Social History     Tobacco Use    Smoking status: Never    Smokeless tobacco: Never   Substance Use Topics    Alcohol use: No    Drug use: No     Review of Systems   Constitutional:  Negative for fever.   Cardiovascular:  Positive for chest pain.   Gastrointestinal:  Negative for nausea.   Musculoskeletal:  Negative for back pain.   Skin:  Negative for rash.   Neurological:  Negative for weakness.       Physical Exam     Initial Vitals [12/01/23 0255]   BP Pulse Resp Temp SpO2   (!) 164/101 92 16 99.1 °F (37.3 °C) 97 %      MAP       --         Physical Exam    Nursing note and vitals reviewed.  Constitutional: She appears well-developed and well-nourished.   HENT:   Head: Normocephalic and atraumatic.   Eyes: EOM are normal.   Neck: Neck supple.   Normal range of motion.  Cardiovascular:  Normal rate and regular rhythm.            Pulmonary/Chest: Breath sounds normal. No respiratory distress.   Abdominal: Abdomen is soft. There is no abdominal tenderness.   Musculoskeletal:         General: No edema. Normal range of motion.      Cervical back: Normal range of motion and neck supple.     Neurological: She is alert and oriented to person, place, and time.   Skin: Skin is warm and dry.   Psychiatric: She has a normal mood and affect. Thought content normal.         ED Course   Procedures  Labs Reviewed   CBC W/ AUTO DIFFERENTIAL   TROPONIN I   LIPASE   BASIC METABOLIC PANEL        ECG Results              EKG 12-lead (Final result)  Result time 12/01/23 15:50:28      Final result by Interface, Lab In [facility] (12/01/23 15:50:28)                   Narrative:    Test Reason : R07.9,    Vent. Rate : 082 BPM     Atrial Rate : 082 BPM     P-R Int : 174 ms          QRS Dur : 084 ms      QT Int : 384 ms       P-R-T Axes : 029 026 011 degrees     QTc Int : 448 ms    Normal sinus rhythm  Normal ECG  When compared with ECG of 29-JUL-2023 23:08,  No significant change was found  Confirmed by Jorge Deshpande MD ([id number]) on 12/1/2023 3:50:21 PM    Referred By: [email or web address]   SELF           Confirmed By:Jorge Deshpande MD                                  Imaging Results              X-Ray Chest PA And Lateral (Final result)  Result time 12/01/23 05:43:22      Final result by Tisha Santacruz MD (12/01/23 05:43:22)                   Impression:      No radiographic evidence of acute intra-thoracic process.      Electronically signed by: Tisha Santacruz MD  Date:    12/01/2023  Time:    05:43               Narrative:    EXAMINATION:  XR CHEST PA AND LATERAL    CLINICAL HISTORY:  Chest pain, unspecified    TECHNIQUE:  PA and lateral views of the chest were performed.    COMPARISON:  07/25/2023    FINDINGS:  The cardiomediastinal silhouette is within normal limits. The visualized airway is unremarkable.  The lungs appear symmetrically aerated without definite focal  alveolar consolidation. No large pleural effusion or pneumothorax is appreciated.Visualized osseous structures are intact.                                       Medications   sucralfate 100 mg/mL suspension 1 g (1 g Oral Given 12/1/23 0407)   ondansetron disintegrating tablet 4 mg (4 mg Oral Given 12/1/23 0407)   guaiFENesin 100 mg/5 ml syrup 200 mg (200 mg Oral Given 12/1/23 0523)     Medical Decision Making  32 y/o F here with CP.  VSS in ED, normal exam.  EKG no STEMI.  Normal CBC, CMP, and trop.  CXR normal.  No emergent findings today, patient well appearing, reassurance provided, DC home, f/u with PCP, script for carafate given, all questions answered, strict RTER precautions given.    Amount and/or Complexity of Data Reviewed  Labs: ordered. Decision-making details documented in ED Course.  Radiology: ordered and independent interpretation performed. Decision-making details documented in ED Course.  ECG/medicine tests: ordered and independent interpretation performed. Decision-making details documented in ED Course.    Risk  OTC drugs.  Prescription drug management.                                  EKG:  Rate 82  NSR  No STEMI      Clinical Impression:  Final diagnoses:  [R07.9] Chest pain          ED Disposition Condition    Discharge Stable          ED Prescriptions       Medication Sig Dispense Start Date End Date Auth. Provider    sucralfate (CARAFATE) 100 mg/mL suspension Take 10 mLs (1 g total) by mouth 4 (four) times daily. 414 mL 12/1/2023 -- Mario Qureshi MD          Follow-up Information    None          Mario Qureshi MD  12/01/23 8357

## 2023-12-01 NOTE — ED TRIAGE NOTES
Pt presents to the ED with complaints of continuous stabbing chest pain radiating to both shoulders, mild SOB on exertion, and nausea/vomiting x2 days. Patient reports taking tylenol at home with no relief. Pt denies HA, diarrhea, fever/chills.

## 2023-12-01 NOTE — DISCHARGE INSTRUCTIONS
Follow-up with your doctor, return for any new or worsening symptoms. You can take over the counter decongestants for sinus issues. Sucralfate for stomach issues.

## 2024-01-19 ENCOUNTER — HOSPITAL ENCOUNTER (EMERGENCY)
Facility: HOSPITAL | Age: 34
Discharge: HOME | End: 2024-01-19
Attending: STUDENT IN AN ORGANIZED HEALTH CARE EDUCATION/TRAINING PROGRAM
Payer: MEDICAID

## 2024-01-19 VITALS
TEMPERATURE: 98 F | OXYGEN SATURATION: 100 % | HEART RATE: 75 BPM | BODY MASS INDEX: 39.68 KG/M2 | SYSTOLIC BLOOD PRESSURE: 134 MMHG | WEIGHT: 210 LBS | RESPIRATION RATE: 18 BRPM | DIASTOLIC BLOOD PRESSURE: 88 MMHG

## 2024-01-19 DIAGNOSIS — K59.00 CONSTIPATION, UNSPECIFIED CONSTIPATION TYPE: Primary | ICD-10-CM

## 2024-01-19 DIAGNOSIS — R31.29 MICROSCOPIC HEMATURIA: ICD-10-CM

## 2024-01-19 LAB
ALBUMIN SERPL BCP-MCNC: 3.8 G/DL (ref 3.5–5.2)
ALP SERPL-CCNC: 90 U/L (ref 55–135)
ALT SERPL W/O P-5'-P-CCNC: 8 U/L (ref 10–44)
ANION GAP SERPL CALC-SCNC: 10 MMOL/L (ref 8–16)
AST SERPL-CCNC: 14 U/L (ref 10–40)
B-HCG UR QL: NEGATIVE
BACTERIA #/AREA URNS AUTO: ABNORMAL /HPF
BASOPHILS # BLD AUTO: 0.04 K/UL (ref 0–0.2)
BASOPHILS NFR BLD: 0.5 % (ref 0–1.9)
BILIRUB SERPL-MCNC: 0.5 MG/DL (ref 0.1–1)
BILIRUB UR QL STRIP: NEGATIVE
BUN SERPL-MCNC: 7 MG/DL (ref 6–20)
CALCIUM SERPL-MCNC: 9.3 MG/DL (ref 8.7–10.5)
CHLORIDE SERPL-SCNC: 104 MMOL/L (ref 95–110)
CLARITY UR REFRACT.AUTO: CLEAR
CO2 SERPL-SCNC: 24 MMOL/L (ref 23–29)
COLOR UR AUTO: YELLOW
CREAT SERPL-MCNC: 0.7 MG/DL (ref 0.5–1.4)
CTP QC/QA: YES
DIFFERENTIAL METHOD BLD: ABNORMAL
EOSINOPHIL # BLD AUTO: 0.2 K/UL (ref 0–0.5)
EOSINOPHIL NFR BLD: 2.6 % (ref 0–8)
ERYTHROCYTE [DISTWIDTH] IN BLOOD BY AUTOMATED COUNT: 13.2 % (ref 11.5–14.5)
EST. GFR  (NO RACE VARIABLE): >60 ML/MIN/1.73 M^2
GLUCOSE SERPL-MCNC: 83 MG/DL (ref 70–110)
GLUCOSE UR QL STRIP: NEGATIVE
HCT VFR BLD AUTO: 43.6 % (ref 37–48.5)
HGB BLD-MCNC: 13.1 G/DL (ref 12–16)
HGB UR QL STRIP: ABNORMAL
IMM GRANULOCYTES # BLD AUTO: 0.02 K/UL (ref 0–0.04)
IMM GRANULOCYTES NFR BLD AUTO: 0.3 % (ref 0–0.5)
KETONES UR QL STRIP: NEGATIVE
LEUKOCYTE ESTERASE UR QL STRIP: ABNORMAL
LIPASE SERPL-CCNC: 13 U/L (ref 4–60)
LYMPHOCYTES # BLD AUTO: 2 K/UL (ref 1–4.8)
LYMPHOCYTES NFR BLD: 27.7 % (ref 18–48)
MCH RBC QN AUTO: 28.5 PG (ref 27–31)
MCHC RBC AUTO-ENTMCNC: 30 G/DL (ref 32–36)
MCV RBC AUTO: 95 FL (ref 82–98)
MICROSCOPIC COMMENT: ABNORMAL
MONOCYTES # BLD AUTO: 0.5 K/UL (ref 0.3–1)
MONOCYTES NFR BLD: 6.4 % (ref 4–15)
NEUTROPHILS # BLD AUTO: 4.6 K/UL (ref 1.8–7.7)
NEUTROPHILS NFR BLD: 62.5 % (ref 38–73)
NITRITE UR QL STRIP: NEGATIVE
NRBC BLD-RTO: 0 /100 WBC
PH UR STRIP: 6 [PH] (ref 5–8)
PLATELET # BLD AUTO: 283 K/UL (ref 150–450)
PMV BLD AUTO: 11.1 FL (ref 9.2–12.9)
POTASSIUM SERPL-SCNC: 3.6 MMOL/L (ref 3.5–5.1)
PROT SERPL-MCNC: 7.3 G/DL (ref 6–8.4)
PROT UR QL STRIP: NEGATIVE
RBC # BLD AUTO: 4.59 M/UL (ref 4–5.4)
RBC #/AREA URNS AUTO: 15 /HPF (ref 0–4)
SODIUM SERPL-SCNC: 138 MMOL/L (ref 136–145)
SP GR UR STRIP: 1.02 (ref 1–1.03)
SQUAMOUS #/AREA URNS AUTO: 3 /HPF
URN SPEC COLLECT METH UR: ABNORMAL
WBC # BLD AUTO: 7.32 K/UL (ref 3.9–12.7)
WBC #/AREA URNS AUTO: 1 /HPF (ref 0–5)

## 2024-01-19 PROCEDURE — 81025 URINE PREGNANCY TEST: CPT | Performed by: STUDENT IN AN ORGANIZED HEALTH CARE EDUCATION/TRAINING PROGRAM

## 2024-01-19 PROCEDURE — 83690 ASSAY OF LIPASE: CPT

## 2024-01-19 PROCEDURE — 80053 COMPREHEN METABOLIC PANEL: CPT

## 2024-01-19 PROCEDURE — 96374 THER/PROPH/DIAG INJ IV PUSH: CPT

## 2024-01-19 PROCEDURE — 85025 COMPLETE CBC W/AUTO DIFF WBC: CPT

## 2024-01-19 PROCEDURE — 63600175 PHARM REV CODE 636 W HCPCS

## 2024-01-19 PROCEDURE — 81001 URINALYSIS AUTO W/SCOPE: CPT | Performed by: STUDENT IN AN ORGANIZED HEALTH CARE EDUCATION/TRAINING PROGRAM

## 2024-01-19 PROCEDURE — 99285 EMERGENCY DEPT VISIT HI MDM: CPT | Mod: 25

## 2024-01-19 RX ORDER — KETOROLAC TROMETHAMINE 30 MG/ML
10 INJECTION, SOLUTION INTRAMUSCULAR; INTRAVENOUS
Status: COMPLETED | OUTPATIENT
Start: 2024-01-19 | End: 2024-01-19

## 2024-01-19 RX ADMIN — KETOROLAC TROMETHAMINE 10 MG: 30 INJECTION, SOLUTION INTRAMUSCULAR; INTRAVENOUS at 07:01

## 2024-01-19 NOTE — ED NOTES
LOC: The patient is awake, alert and aware of environment with an appropriate affect, the patient is oriented x 3 and speaking appropriately.   APPEARANCE: Patient appears comfortable and in no acute distress, patient is clean and well groomed.  SKIN: The skin is warm and dry, color consistent with ethnicity, patient has normal skin turgor and moist mucus membranes, skin intact, no breakdown or bruising noted.   MUSCULOSKELETAL: Patient moving all extremities spontaneously, no swelling noted.  RESPIRATORY: Airway is open and patent, respirations are spontaneous, patient has a normal effort and rate, no accessory muscle.  CARDIAC: Pt placed on cardiac monitor. Patient has a normal rate and regular rhythm, no edema noted, capillary refill < 3 seconds.   GASTRO: Soft and tender to palpation, no distention noted. Abdominal pain to RLQ going into right flank.  : Pt denies any pain or frequency with urination.  NEURO: Pt opens eyes spontaneously, behavior appropriate to situation, follows commands, facial expression symmetrical, bilateral hand grasp equal and even, purposeful motor response noted, normal sensation in all extremities when touched with a finger.

## 2024-01-19 NOTE — ED PROVIDER NOTES
Encounter Date: 1/19/2024       History     Chief Complaint   Patient presents with    Abdominal Pain    Constipation     LBM 2-3 weeks ago     LIVE Guzman is a 33 y.o. female w/ a PMHx of left MCA stroke, HTN, preeclampsia, postpartum ligation of fallopian tube bilaterally (2020).  Patient presents to the ED today w/complaints of constipation and abdominal pain.  Regarding her constipation, patient states that her last bowel was aprox. 2-3 weeks ago.  Prior to this, stools were of normal caliber, no melena or hematochezia reported.  Regarding patient's abdominal pain, she states that this has also been present for the past few weeks.  She describes this pain as constant starting from her right flank wrapping around to her groin.  Patient also states that she has some RUQ tenderness.  Patient denies f/c, HA, CP, SOB, cough/congestion, dysuria, hematuria, hematochezia, melena, decreased appetite, or difficulty tolerating p.o. intake, pelvic pain, vaginal bleeding.      Review of patient's allergies indicates:  No Known Allergies  Past Medical History:   Diagnosis Date    GERD (gastroesophageal reflux disease)     Hypertension     Hypertension affecting pregnancy in second trimester 05/01/2020     Past Surgical History:   Procedure Laterality Date    POSTPARTUM LIGATION OF FALLOPIAN TUBE Bilateral 9/9/2020    Procedure: LIGATION, FALLOPIAN TUBE, POSTPARTUM;  Surgeon: KODI Talley MD;  Location: [facility] L&D;  Service: OB/GYN;  Laterality: Bilateral;     Family History   Problem Relation Age of Onset    Colon cancer Neg Hx     Eclampsia Neg Hx     Breast cancer Neg Hx     Ovarian cancer Neg Hx     Cancer Neg Hx      Social History     Tobacco Use    Smoking status: Never    Smokeless tobacco: Never   Substance Use Topics    Alcohol use: No    Drug use: No     Review of Systems    Physical Exam     Initial Vitals [01/19/24 1524]   BP Pulse Resp Temp SpO2   (!) 147/85 80 16 98 °F (36.7 °C) 98 %      MAP        --         Physical Exam    Nursing note and vitals reviewed.  Constitutional: She appears well-developed and well-nourished. No distress.   HENT:   Head: Normocephalic and atraumatic.   Nose: Nose normal.   Eyes: Conjunctivae and EOM are normal.   Neck: Neck supple.   Normal range of motion.  Cardiovascular:  Normal rate, regular rhythm, normal heart sounds and intact distal pulses.           Pulmonary/Chest: Breath sounds normal. No respiratory distress.   Abdominal: Abdomen is soft. Bowel sounds are normal. She exhibits no distension. There is abdominal tenderness (Tender to palpation predominantly in right flank and right upper/lower quadrants w/ some suprapubic tenderness.  CVA tenderness to percussion.).   Musculoskeletal:         General: No edema. Normal range of motion.      Cervical back: Normal range of motion and neck supple.     Neurological: She is alert and oriented to person, place, and time. No sensory deficit.   Skin: Skin is warm and dry. Capillary refill takes less than 2 seconds.   Psychiatric: She has a normal mood and affect. Her behavior is normal. Judgment and thought content normal.         ED Course   Procedures  Labs Reviewed   URINALYSIS, REFLEX TO URINE CULTURE - Abnormal; Notable for the following components:       Result Value    Occult Blood UA 1+ (*)     Leukocytes, UA Trace (*)     All other components within normal limits    Narrative:     Specimen Source->Urine   CBC W/ AUTO DIFFERENTIAL - Abnormal; Notable for the following components:    MCHC 30.0 (*)     All other components within normal limits   COMPREHENSIVE METABOLIC PANEL - Abnormal; Notable for the following components:    ALT 8 (*)     All other components within normal limits   URINALYSIS MICROSCOPIC - Abnormal; Notable for the following components:    RBC, UA 15 (*)     All other components within normal limits    Narrative:     Specimen Source->Urine   LIPASE   HIV 1 / 2 ANTIBODY   HEPATITIS C ANTIBODY   POCT URINE  PREGNANCY          Imaging Results              CT Abdomen Pelvis  Without Contrast (Final result)  Result time 01/19/24 18:29:53      Final result by Maury Monteiro MD (01/19/24 18:29:53)                   Impression:      1. No findings to suggest obstructive uropathy.  2. Please see above for several additional findings.      Electronically signed by: Maury Monteiro MD  Date:    01/19/2024  Time:    18:29               Narrative:    EXAMINATION:  CT ABDOMEN PELVIS WITHOUT CONTRAST    CLINICAL HISTORY:  Flank pain, kidney stone suspected;Bowel obstruction suspected;    TECHNIQUE:  Low dose axial images, sagittal and coronal reformations were obtained from the lung bases to the pubic symphysis.  Oral contrast was not administered.    COMPARISON:  11/21/2022    FINDINGS:  images of the lower thorax are remarkable for bilateral dependent atelectasis, minimal.    The liver, spleen, pancreas, gallbladder and adrenal glands have a grossly unremarkable noncontrast appearance. There is no biliary dilation or ascites.  No significant abdominal lymphadenopathy.    The kidneys have a grossly unremarkable noncontrast appearance without hydronephrosis or nephrolithiasis. The bilateral ureters are unable to be followed in their entirety to the urinary bladder, no definite calculi seen or secondary findings to suggest obstructive uropathy. The urinary bladder is decompressed. The uterus and adnexa are unremarkable.    There is moderate stool in the right colon.  The terminal ileum and appendix are unremarkable. The small bowel is grossly unremarkable. There are a few scattered shotty periaortic, pericaval, and mesenteric lymph nodes.  No focal organized pelvic fluid collection.    There are degenerative changes of the spine.  No significant inguinal lymphadenopathy.                                       Medications   ketorolac injection 9.999 mg (9.999 mg Intravenous Given 1/19/24 1923)     Medical Decision Making  Patient  is a 33-year-old female who presents to the ED w/ complaints of constipation and abdominal pain for aprox. the last 2-3 weeks w/o recent changes in stool habits prior to constipation, dysuria, hematuria, N/V, changes in appetite or p.o. intolerance, or pelvic pain. On initial evaluation, patient is in mild distress 2/2 pain and VSS.  Toradol given for pain.  Patient does have abdominal tenderness to palpation along right flank, and right upper/lower quadrants.  Minimal tenderness in other quadrants.  At this time DDx includes but is not limited to functional constipation v. bowel obstruction v. UTI/pyelonephritis v. cholecystitis v. nephrolithiasis v. pancreatitis.  CBC w/o evidence of leukocytosis, reducing concern for significant acute infectious process.  CMP WNL, reducing concern for PALOMA, nephrolithiasis or other obstructive renal pathology.  Lipase WNL, reducing concern for pancreatitis.  Urinalysis does reveal 1+ occult blood w/ trace leukocytes, possibly indicating cystitis.  UPT negative consistent w/known tubal ligation, significantly reducing concerns for any pathology related to pregnancy.CT abdomen pelvis shows no definitive evidence of liver, spleen, pancreas, gallbladder pathology.  No biliary dilation noted reducing concern for cholecystitis.  Kidneys w/o hydronephrosis or nephrolithiasis.  No ureter dilation noted.  CT did visualize stool burden in the right colon, but no significant evidence of obstruction.  Given the findings detailed above, I suspect that patient's symptoms are related to functional constipation.  On re-evaluation, patient is pain is under control.  Patient deemed safe for discharge at this time and was given advice regarding treatment for constipation including the use of fiber supplements, suppositories, enemas, and other over-the-counter treatment options.  Strict return precautions given.  Patient expressed understanding and agreement w/ this plan.     Please see HPI, physical  exam, ED course for additional details.    Amount and/or Complexity of Data Reviewed  Labs: ordered. Decision-making details documented in ED Course.  Radiology: ordered.    Risk  Prescription drug management.               ED Course as of 01/19/24 2341 Fri Jan 19, 2024 1810 Preg Test, Ur: Negative [AC]   1829 WBC: 7.32 [AC]   1829 Hemoglobin: 13.1 [AC]      ED Course User Index  [AC] Abundio Dhillon DO                             Clinical Impression:  Final diagnoses:  [K59.00] Constipation, unspecified constipation type (Primary)  [R31.29] Microscopic hematuria          ED Disposition Condition    Discharge           ED Prescriptions    None       Follow-up Information    None          Williams Mccarty MD  Resident  01/19/24 2349

## 2024-01-19 NOTE — ED TRIAGE NOTES
Patient comes into the emergency department by POV with complaints of abdominal pain. Patient states that she's been constipated for 3 weeks, with increasing pain in RLQ going to right flank; reports nausea but no vomiting, still able to have a regular appetite.

## 2024-01-20 NOTE — DISCHARGE INSTRUCTIONS
You were evaluated in the emergency department today for constipation.  Although there were no findings of concern to necessitate admission to the hospital or warrant immediate surgical intervention, disease exists on a spectrum and your disease process may progress.  If this is the case, please watch your symptoms and return to the emergency department if you feel worse and are unable to discuss care with your primary care doctor in follow up in the next several days.  Specific information regarding your complaint has been provided.  Thank you for choosing [facility]!    You were seen in the ER and found to have constipation.  This means your stools are very thick and you need to pass your bowels.  You should take over the counter laxatives or medications that you have been prescribed.  Sometimes, enema may be necessary.  Keep a high fiber diet and drink plenty of water if you are able to do so.  If you go several days and do not pass your bowels, if you have abdominal distention or if you feel like you cannot pass gas or start vomiting, then please return to the ED for re-evaluation.

## 2024-08-12 ENCOUNTER — HOSPITAL ENCOUNTER (EMERGENCY)
Facility: HOSPITAL | Age: 34
Discharge: HOME | End: 2024-08-12
Attending: EMERGENCY MEDICINE
Payer: MEDICAID

## 2024-08-12 VITALS
TEMPERATURE: 98 F | RESPIRATION RATE: 18 BRPM | WEIGHT: 210 LBS | DIASTOLIC BLOOD PRESSURE: 101 MMHG | HEART RATE: 80 BPM | SYSTOLIC BLOOD PRESSURE: 168 MMHG | OXYGEN SATURATION: 100 % | HEIGHT: 60 IN | BODY MASS INDEX: 41.23 KG/M2

## 2024-08-12 DIAGNOSIS — M54.9 BACK PAIN, UNSPECIFIED BACK LOCATION, UNSPECIFIED BACK PAIN LATERALITY, UNSPECIFIED CHRONICITY: Primary | ICD-10-CM

## 2024-08-12 LAB
B-HCG UR QL: NEGATIVE
BACTERIA #/AREA URNS AUTO: ABNORMAL /HPF
BILIRUB UR QL STRIP: ABNORMAL
CLARITY UR REFRACT.AUTO: CLEAR
COLOR UR AUTO: YELLOW
CTP QC/QA: YES
GLUCOSE UR QL STRIP: NEGATIVE
HCV AB SERPL QL IA: NORMAL
HGB UR QL STRIP: ABNORMAL
HIV 1+2 AB+HIV1 P24 AG SERPL QL IA: NORMAL
HYALINE CASTS UR QL AUTO: 0 /LPF
KETONES UR QL STRIP: NEGATIVE
LEUKOCYTE ESTERASE UR QL STRIP: NEGATIVE
MICROSCOPIC COMMENT: ABNORMAL
NITRITE UR QL STRIP: POSITIVE
PH UR STRIP: 7 [PH] (ref 5–8)
PROT UR QL STRIP: ABNORMAL
RBC #/AREA URNS AUTO: 15 /HPF (ref 0–4)
SP GR UR STRIP: >1.03 (ref 1–1.03)
SQUAMOUS #/AREA URNS AUTO: 6 /HPF
URN SPEC COLLECT METH UR: ABNORMAL
WBC #/AREA URNS AUTO: 2 /HPF (ref 0–5)

## 2024-08-12 PROCEDURE — 86803 HEPATITIS C AB TEST: CPT | Performed by: PHYSICIAN ASSISTANT

## 2024-08-12 PROCEDURE — 99284 EMERGENCY DEPT VISIT MOD MDM: CPT | Mod: 25

## 2024-08-12 PROCEDURE — 96374 THER/PROPH/DIAG INJ IV PUSH: CPT

## 2024-08-12 PROCEDURE — 81001 URINALYSIS AUTO W/SCOPE: CPT

## 2024-08-12 PROCEDURE — 81025 URINE PREGNANCY TEST: CPT

## 2024-08-12 PROCEDURE — 63600175 PHARM REV CODE 636 W HCPCS

## 2024-08-12 PROCEDURE — 87389 HIV-1 AG W/HIV-1&-2 AB AG IA: CPT | Performed by: PHYSICIAN ASSISTANT

## 2024-08-12 RX ORDER — MORPHINE SULFATE 2 MG/ML
6 INJECTION, SOLUTION INTRAMUSCULAR; INTRAVENOUS
Status: COMPLETED | OUTPATIENT
Start: 2024-08-12 | End: 2024-08-12

## 2024-08-12 RX ADMIN — MORPHINE SULFATE 6 MG: 2 INJECTION, SOLUTION INTRAMUSCULAR; INTRAVENOUS at 03:08

## 2024-08-12 NOTE — ED PROVIDER NOTES
Encounter Date: 8/12/2024       History     Chief Complaint   Patient presents with    Abdominal Pain    Back Pain     Pt complaining of pain that starts in her right lower abdomen and radiates to her back and down her right leg x2 days. Pt denies N/V/diarrhea     Patient is a 34-year-old female presenting with lower back pain with pain radiating down both legs.  Patient has a past medical history of hypertension diagnosed at her primary care doctor.  Patient is not under going treatment for this condition at this time.  Patient states that she is having pain which she indicates with her hand that starts at the sacrum and travels down both legs past the knee.  Patient states that the pain is positional.  Patient states that she has no history of collagen defects including Jesse-Danlos.  Patient denies chest pain and smoking.  Patient denies abdominal pain paresthesias loss of consciousness slurred speech.  Patient took 300 mg of ibuprofen prior to arrival.  Patient states that this did not really help her pain.  Patient denies nausea vomiting diarrhea.  Patient denies bowel or bladder changes.  Patient denies IV drug use.  Patient works on her feet as a  but does not do lifting at work.    Patient denies unexplained weight loss is less than 50 has no history of cancer and does not use steroids.        Review of patient's allergies indicates:  No Known Allergies  Past Medical History:   Diagnosis Date    GERD (gastroesophageal reflux disease)     Hypertension     Hypertension affecting pregnancy in second trimester 05/01/2020     Past Surgical History:   Procedure Laterality Date    POSTPARTUM LIGATION OF FALLOPIAN TUBE Bilateral 9/9/2020    Procedure: LIGATION, FALLOPIAN TUBE, POSTPARTUM;  Surgeon: KODI Talley MD;  Location: [facility] L&D;  Service: OB/GYN;  Laterality: Bilateral;     Family History   Problem Relation Name Age of Onset    Colon cancer Neg Hx      Eclampsia Neg Hx      Breast cancer Neg  Hx      Ovarian cancer Neg Hx      Cancer Neg Hx       Social History     Tobacco Use    Smoking status: Never    Smokeless tobacco: Never   Substance Use Topics    Alcohol use: No    Drug use: No     Review of Systems    Physical Exam     Initial Vitals [08/12/24 0115]   BP Pulse Resp Temp SpO2   (!) 152/85 96 18 98.7 °F (37.1 °C) 96 %      MAP       --         Physical Exam    Constitutional: She appears well-developed and well-nourished.   HENT:   Head: Normocephalic and atraumatic.   Neck: Neck supple. No thyromegaly present. No tracheal deviation present. No JVD present.   Spinous processes percussed from C2-L5.  No focal tenderness of the vertebral processes.  Remainder of spine exam was initially limited by pain.  Patient was given 4 of morphine and exam was repeated.  Patient was completely neurologically intact at time of discharge.  Patient's straight leg test was negative.  Patient was able to move neck in all 3 abscesses without complaint and able to bend over at waist and touch toes prior to discharge.  Patient able to ambulate without assistance.  Patient feels steady on her feet.   Normal range of motion.  Cardiovascular:  Normal rate, regular rhythm, normal heart sounds and intact distal pulses.     Exam reveals no gallop and no friction rub.       No murmur heard.  Pulmonary/Chest: Breath sounds normal. No stridor. No respiratory distress. She has no wheezes. She has no rhonchi. She has no rales. She exhibits no tenderness.   Abdominal: Abdomen is soft. She exhibits no distension. There is no abdominal tenderness. There is no rebound.   Musculoskeletal:         General: No tenderness or edema. Normal range of motion.      Cervical back: Normal range of motion and neck supple.     Lymphadenopathy:     She has no cervical adenopathy.   Neurological: She is alert and oriented to person, place, and time. She has normal strength and normal reflexes. She displays normal reflexes. No cranial nerve deficit  or sensory deficit. GCS score is 15. GCS eye subscore is 4. GCS verbal subscore is 5. GCS motor subscore is 6.   Skin: Skin is warm and dry.   Psychiatric: She has a normal mood and affect. Her behavior is normal. Judgment and thought content normal.       ED Course   Procedures  Labs Reviewed   HIV 1 / 2 ANTIBODY   HEPATITIS C ANTIBODY          Imaging Results    None          Medications - No data to display  Medical Decision Making  Differential diagnosis includes not limited to muscle sprain strain of paraspinal muscles, herniated disc, compression fracture.    Life-threatening diagnosis is considered and excluded include epidural abscess new onset multiple sclerosis trauma metastasis.  Epidural abscess is unlikely any well if a pain patient without IV drug use.  New onset AMS is unlikely in the context of a normal neurologic exam.  Bony metastasis unlikely in the setting of no history of cancer and no weight loss and no night sweats.  Additionally spinal x-rays were normal.  If back pain persists as an outpatient, further testing may be needed to exclude.    Amount and/or Complexity of Data Reviewed  External Data Reviewed: notes.     Details:   Note reviewed from [facility] Primary care from 01/26/2024 presenting to discuss her blood pressure.  Because she feels that her blood pressure is too high and is having headaches.  Patient has not been on blood pressure medication.      Note reviewed from [facility] or patient presented to OB gyn on 12/28/2023 in this appointment she had vulvar bumps caused by her soap.  No acute findings relevant to today's complaint.  Labs: ordered. Decision-making details documented in ED Course.  Radiology: ordered and independent interpretation performed. Decision-making details documented in ED Course.  ECG/medicine tests:      Details: Considered ordering ECG however patient has no known cardiac conditions he is not complaining of chest pain shortness of breath dyspnea on exertion or  palpitations.  Patient has not had a syncopal or near syncopal event.  Patient has no history of sudden cardiac death in parents.    Risk  OTC drugs.  Prescription drug management.  Parenteral controlled substances.  Risk Details: Patient high-risk given that we had used.  Total controlled substances in order to complete full neurologic exam               ED Course as of 08/12/24 2053   Mon Aug 12, 2024   0432 X-ray independently interpreted with no fractures. [KW]   0432 Blood, UA(!): Trace  Please follow up with her primary care physician regarding trace blood in the urine.  Repeat study should be performed by your outpatient PCP to ensure resolution. [KW]      ED Course User Index  [KW] Son King MD                           Clinical Impression:  Back pain   Hypertension          Son King MD  Resident  08/12/24 2111

## 2024-08-12 NOTE — ED TRIAGE NOTES
Macie Guzman, a 34 y.o. female presents to the ED w/ complaint of RLQ abdominal pain that radiates to her back and down her right thigh x2 days. Denies N/V/D, states that the pain has gotten increasingly worse.     Triage note:  Chief Complaint   Patient presents with    Abdominal Pain    Back Pain     Pt complaining of pain that starts in her right lower abdomen and radiates to her back and down her right leg x2 days. Pt denies N/V/diarrhea     Review of patient's allergies indicates:  No Known Allergies  Past Medical History:   Diagnosis Date    GERD (gastroesophageal reflux disease)     Hypertension     Hypertension affecting pregnancy in second trimester 05/01/2020

## 2024-08-12 NOTE — ED NOTES
Patient identifiers for Macie Guzman checked and correct.  LOC: Patient is awake, alert, and aware of environment with an appropriate affect. Patient is oriented x 3 and speaking appropriately.  APPEARANCE: Patient resting comfortably and in no acute distress. Patient is clean and well groomed, patient's clothing is properly fastened.  SKIN: The skin is warm and dry. Patient has normal skin turgor and moist mucus membrances. Skin is intact; no bruising or breakdown noted.  MUSKULOSKELETAL: Patient is moving all extremities well, no obvious deformities noted. Pulses intact. +tingling to bilateral lower extremities.   RESPIRATORY: Airway is open and patent. Respirations are spontaneous and non-labored with normal effort and rate.  CARDIAC: Patient has a normal rate and rhythm. No peripheral edema noted. Capillary refill < 3 seconds.  ABDOMEN: No distention noted. Bowel sounds active in all 4 quadrants. Soft and non-tender upon palpation. Endorses RLQ abdominal pain that radiates to the lower back and down to her right thigh.   NEUROLOGICAL: PERRL. Facial expression is symmetrical. Hand grasps are equal bilaterally. Normal sensation in all extremities when touched with finger.  Allergies reported: Review of patient's allergies indicates:  No Known Allergies

## 2024-08-12 NOTE — Clinical Note
"Macie Crawford" Megan was seen and treated in our emergency department on 8/12/2024.  She may return to work on 08/13/2024.       If you have any questions or concerns, please don't hesitate to call.      Son King MD"

## 2024-08-17 RX ORDER — CEPHALEXIN 500 MG/1
500 CAPSULE ORAL 4 TIMES DAILY
Qty: 40 CAPSULE | Refills: 0 | Status: SHIPPED | OUTPATIENT
Start: 2024-08-17 | End: 2024-08-27

## 2024-08-17 NOTE — PROVIDER PROGRESS NOTES - EMERGENCY DEPT.
Encounter Date: 8/12/2024    ED Physician Progress Notes          Delayed note:   8/17 at 6:00 p.m. in reviewing patient's urinalysis the patient was noted to have slight hematuria and nitrite positive urine concerning for UTI.    I called the patient back at home and she states she continues to have some lower back pain, no dysuria or hematuria and denies any nausea vomiting fevers or chills    Patient was provided an electronic prescription for Keflex as an outpatient to her preferred pharmacy    The patient states that she is still having pain, I asked the patient if pain does not improve with antibiotics to return to an emergency department that is convenient for her as we consider pyelonephritis or ureterolithiasis.    The patient states that she was aware of her urine results and will be returning if needed

## 2024-09-19 ENCOUNTER — PATIENT MESSAGE (OUTPATIENT)
Dept: PRIMARY CARE CLINIC | Facility: CLINIC | Age: 34
End: 2024-09-19
Payer: MEDICAID

## 2024-09-26 ENCOUNTER — ON-DEMAND VIRTUAL (OUTPATIENT)
Dept: URGENT CARE | Facility: CLINIC | Age: 34
End: 2024-09-26
Payer: MEDICAID

## 2024-09-26 ENCOUNTER — PATIENT MESSAGE (OUTPATIENT)
Dept: URGENT CARE | Facility: CLINIC | Age: 34
End: 2024-09-26

## 2024-09-26 DIAGNOSIS — A74.9 CHLAMYDIA: Primary | ICD-10-CM

## 2024-09-26 RX ORDER — DOXYCYCLINE 100 MG/1
100 CAPSULE ORAL 2 TIMES DAILY
Qty: 14 CAPSULE | Refills: 0 | Status: SHIPPED | OUTPATIENT
Start: 2024-09-26 | End: 2024-10-03

## 2024-09-26 NOTE — PROGRESS NOTES
Subjective:      Patient ID: Macie Guzman is a 34 y.o. female.    Vitals:  vitals were not taken for this visit.     Chief Complaint: chlamydia      Visit Type: TELE AUDIOVISUAL    Present with the patient at the time of consultation: TELEMED PRESENT WITH PATIENT: None, at home    Past Medical History:   Diagnosis Date    GERD (gastroesophageal reflux disease)     Hypertension     Hypertension affecting pregnancy in second trimester 05/01/2020     Past Surgical History:   Procedure Laterality Date    POSTPARTUM LIGATION OF FALLOPIAN TUBE Bilateral 9/9/2020    Procedure: LIGATION, FALLOPIAN TUBE, POSTPARTUM;  Surgeon: KODI Talley MD;  Location: [facility];  Service: OB/GYN;  Laterality: Bilateral;     Review of patient's allergies indicates:  No Known Allergies  Current Outpatient Medications on File Prior to Visit   Medication Sig Dispense Refill    [DISCONTINUED] acyclovir (ZOVIRAX) 800 MG Tab Take 1 tablet (800 mg total) by mouth 5 (five) times daily. for 7 days 35 tablet 0    [DISCONTINUED] amLODIPine (NORVASC) 2.5 MG tablet Take 2 tablets (5 mg total) by mouth once daily. 30 tablet 1    [DISCONTINUED] aspirin 81 MG Chew Take 1 tablet (81 mg total) by mouth once daily. 30 tablet 0    [DISCONTINUED] atorvastatin (LIPITOR) 40 MG tablet TAKE 1 TABLET(40 MG) BY MOUTH EVERY DAY (Patient not taking: No sig reported) 30 tablet 0    [DISCONTINUED] baclofen (LIORESAL) 10 MG tablet Take 1 tablet (10 mg total) by mouth 3 (three) times daily. for 10 days 30 tablet 0    [DISCONTINUED] benzonatate (TESSALON PERLES) 100 MG capsule Take 2 capsules (200 mg total) by mouth 3 (three) times daily as needed for Cough. 60 capsule 0    [DISCONTINUED] famotidine (PEPCID) 20 MG tablet Take 1 tablet (20 mg total) by mouth 2 (two) times daily. 60 tablet 1    [DISCONTINUED] fluconazole (DIFLUCAN) 150 MG Tab Take 1 tab if experiencing itching/discharge, then a 2nd tablet in 3 days if symptoms have not resolved. 2 tablet 0     [DISCONTINUED] indomethacin (INDOCIN) 50 MG capsule Take 1 capsule (50 mg total) by mouth 3 (three) times daily as needed (pain). 20 capsule 0    [DISCONTINUED] naproxen (NAPROSYN) 500 MG tablet Take 1 tablet (500 mg total) by mouth 2 (two) times daily as needed (pain). 20 tablet 0    [DISCONTINUED] omeprazole (PRILOSEC) 40 MG capsule Take 1 capsule (40 mg total) by mouth once daily. 60 capsule 1    [DISCONTINUED] ondansetron (ZOFRAN-ODT) 4 MG TbDL Take 1 tablet (4 mg total) by mouth 3 (three) times daily. 10 tablet 0    [DISCONTINUED] ondansetron (ZOFRAN-ODT) 4 MG TbDL Take 1-2 tablets (4-8 mg total) by mouth every 8 (eight) hours as needed (Nausea/Vomiting). 20 tablet 0    [DISCONTINUED] sucralfate (CARAFATE) 100 mg/mL suspension Take 10 mLs (1 g total) by mouth 4 (four) times daily. 414 mL 0    [DISCONTINUED] tamsulosin (FLOMAX) 0.4 mg Cap Take 1 capsule (0.4 mg total) by mouth once daily. 10 capsule 0     No current facility-administered medications on file prior to visit.     Family History   Problem Relation Name Age of Onset    Colon cancer Neg Hx      Eclampsia Neg Hx      Breast cancer Neg Hx      Ovarian cancer Neg Hx      Cancer Neg Hx         Medications Ordered                [id number] [address] [address]    Telephone: [phone]   Fax: [phone]   Hours: Not open 24 hours                         E-Prescribed (1 of 1)              doxycycline (VIBRAMYCIN) 100 MG Cap    Sig: Take 1 capsule (100 mg total) by mouth 2 (two) times daily. for 7 days       Start: 9/26/24     Quantity: 14 capsule Refills: 0                           Ohs Peq Odvv Intake    9/26/2024 12:23 PM CDT - Filed by Patient   What is your current physical address in the event of a medical emergency?    Are you able to take your vital signs? No   Please attach any relevant images or files          Seen in ED in  August. Yesterday was called and told she was positive for chlamydia and needs treatment. Patient denies any active symptoms at this time.        Gastrointestinal:  Negative for abdominal pain.   Genitourinary:  Negative for dysuria, hematuria, vaginal pain, vaginal discharge, vaginal bleeding, vaginal odor, genital sore and pelvic pain.   Musculoskeletal:  Negative for back pain.   Allergic/Immunologic: Negative for itching.        Objective:   The physical exam was conducted virtually.  Physical Exam   Constitutional: She is oriented to person, place, and time. She does not appear ill. No distress.   HENT:   Head: Normocephalic and atraumatic.   Nose: Nose normal.   Eyes: Extraocular movement intact   Pulmonary/Chest: Effort normal.   Abdominal: Normal appearance.   Musculoskeletal: Normal range of motion.         General: Normal range of motion.   Neurological: no focal deficit. She is alert and oriented to person, place, and time.   Psychiatric: Her behavior is normal. Mood normal.   Vitals reviewed.      Assessment:     1. Chlamydia        Plan:     Follow-up as discussed.    Patient encouraged to monitor symptoms closely and instructed to follow-up for new or worsening symptoms. Further, in-person, evaluation may be necessary for continued treatment. Please follow up with your primary care doctor or specialist as needed. Verbally discussed plan. Patient confirms understanding and is in agreement with treatment and plan.     You must understand that you've received a [facility] Care evaluation only and that you may be released before all your medical problems are known or treated. You, the patient, will arrange for follow up care as instructed.    Chlamydia  -     doxycycline (VIBRAMYCIN) 100 MG Cap; Take 1 capsule (100 mg total) by mouth 2 (two) times daily. for 7 days  Dispense: 14 capsule; Refill: 0      Patient Instructions   Patient Education       Chlamydia Discharge Instructions   About this topic    Chlamydia is an infection you can catch during sex. This means it is a sexually-transmitted disease or STD. It is caused by a germ. This infection easily passes from person to person during vaginal, anal, or oral sex. It can also pass to your baby during pregnancy.  Chlamydia may have no signs. If you have signs, they may happen 1 to 3 weeks after contact. You may have signs like:  Burning or pain when passing urine  Pain during sex  Itching or burning in or around your genitals  Discharge from your vagina or penis  Bleeding between menstrual periods  Chlamydia can be treated with antibiotics. It is important that you take all of the antibiotics the right way and finish your treatment     What care is needed at home?   Ask your doctor what you need to do when you go home. Make sure you ask questions if you do not understand what the doctor says. This way you will know what you need to do.  Tell your sex partner(s) or those whom you had sex with in the past 3 to 6 months to get tested. They need to be treated as well.  Avoid sharing sex toys. If you share toys, clean them and cover them with a condom before you use.  What follow-up care is needed?   Your doctor may ask you to make visits to the office to check on your progress. Be sure to keep these visits.  Your doctor may ask you to get tested again in 4 weeks to make sure the infection is gone.  What drugs may be needed?   Your doctor will order drugs to fight an infection. Take all drugs as ordered by your doctor. Tell your doctor if you have an allergy or any reaction to the drug.  Be sure to take all of the drug. It is important that you finish the treatment.  Do not share your drugs with anyone.  Will physical activity be limited?   Physical activity may not be limited. Do not have sex until you have finished the treatment and the doctor has told you it is safe to do so.  What problems could happen?   If infection is not treated, it can:  Spread into the  uterus  Cause swelling of the testes  Cause a condition called pelvic inflammatory disease  Cause infertility  Chlamydia during pregnancy may cause:  Miscarriage   baby  Eye infection in your baby  Your baby may develop serious problems  What can be done to prevent this health problem?   The only sure way to keep from getting or passing on a sexually-transmitted infection is to not have sexual contact with any person. This infection may be spread even if you do not have any signs of illness.  Avoid contact with any sex partner known to have an infection.  If you have sex, use latex condoms each time to lower spread of infection.  If you are pregnant, get tested and get prompt treatment for Chlamydia infections. This will help avoid passing it to your baby.  Avoid multiple sex partners. Be in a long-term relationship with only one person who has been tested and is known to have no infection.  Get a regular check-up for STDs.  When do I need to call the doctor?   Signs of infection. These include a fever of 100.4°F (38°C) or higher, chills, very bad sore throat, pain with passing urine, blood in urine, mouth sores, a wound that will not heal, or anal itching or pain.  Signs come back  Soreness or bleeding in genitals  Bleeding between menstrual periods  Pain during sex  Teach Back: Helping You Understand   The Teach Back Method helps you understand the information we are giving you. After you talk with the staff, tell them in your own words what you learned. This helps to make sure the staff has described each thing clearly. It also helps to explain things that may have been confusing. Before going home, make sure you can do these:  I can tell you about my condition.  I can tell you what I can do to help avoid passing the infection to others.  I can tell you what I will do if I have the same signs again, soreness or bleeding in my genitals, or pain during sex.  Where can I learn more?   Centers for Disease  Control and Prevention  https://www.cdc.gov/std/chlamydia/stdfact-chlamydia-detailed.htm   Kids Health  https://kidshealth.org/en/parents/chlamydia.html   NHS Choices  https://www.nhs.uk/conditions/chlamydia/   Last Reviewed Date   2021-03-18  Consumer Information Use and Disclaimer   This information is not specific medical advice and does not replace information you receive from your health care provider. This is only a brief summary of general information. It does NOT include all information about conditions, illnesses, injuries, tests, procedures, treatments, therapies, discharge instructions or life-style choices that may apply to you. You must talk with your health care provider for complete information about your health and treatment options. This information should not be used to decide whether or not to accept your health care providers advice, instructions or recommendations. Only your health care provider has the knowledge and training to provide advice that is right for you.  Copyright   Copyright © 2021 [employer] and its affiliates and/or licensors. All rights reserved.

## 2024-09-26 NOTE — PATIENT INSTRUCTIONS
Patient Education       Chlamydia Discharge Instructions   About this topic   Chlamydia is an infection you can catch during sex. This means it is a sexually-transmitted disease or STD. It is caused by a germ. This infection easily passes from person to person during vaginal, anal, or oral sex. It can also pass to your baby during pregnancy.  Chlamydia may have no signs. If you have signs, they may happen 1 to 3 weeks after contact. You may have signs like:  Burning or pain when passing urine  Pain during sex  Itching or burning in or around your genitals  Discharge from your vagina or penis  Bleeding between menstrual periods  Chlamydia can be treated with antibiotics. It is important that you take all of the antibiotics the right way and finish your treatment     What care is needed at home?   Ask your doctor what you need to do when you go home. Make sure you ask questions if you do not understand what the doctor says. This way you will know what you need to do.  Tell your sex partner(s) or those whom you had sex with in the past 3 to 6 months to get tested. They need to be treated as well.  Avoid sharing sex toys. If you share toys, clean them and cover them with a condom before you use.  What follow-up care is needed?   Your doctor may ask you to make visits to the office to check on your progress. Be sure to keep these visits.  Your doctor may ask you to get tested again in 4 weeks to make sure the infection is gone.  What drugs may be needed?   Your doctor will order drugs to fight an infection. Take all drugs as ordered by your doctor. Tell your doctor if you have an allergy or any reaction to the drug.  Be sure to take all of the drug. It is important that you finish the treatment.  Do not share your drugs with anyone.  Will physical activity be limited?   Physical activity may not be limited. Do not have sex until you have finished the treatment and the doctor has told you it is safe to do so.  What  problems could happen?   If infection is not treated, it can:  Spread into the uterus  Cause swelling of the testes  Cause a condition called pelvic inflammatory disease  Cause infertility  Chlamydia during pregnancy may cause:  Miscarriage   baby  Eye infection in your baby  Your baby may develop serious problems  What can be done to prevent this health problem?   The only sure way to keep from getting or passing on a sexually-transmitted infection is to not have sexual contact with any person. This infection may be spread even if you do not have any signs of illness.  Avoid contact with any sex partner known to have an infection.  If you have sex, use latex condoms each time to lower spread of infection.  If you are pregnant, get tested and get prompt treatment for Chlamydia infections. This will help avoid passing it to your baby.  Avoid multiple sex partners. Be in a long-term relationship with only one person who has been tested and is known to have no infection.  Get a regular check-up for STDs.  When do I need to call the doctor?   Signs of infection. These include a fever of 100.4°F (38°C) or higher, chills, very bad sore throat, pain with passing urine, blood in urine, mouth sores, a wound that will not heal, or anal itching or pain.  Signs come back  Soreness or bleeding in genitals  Bleeding between menstrual periods  Pain during sex  Teach Back: Helping You Understand   The Teach Back Method helps you understand the information we are giving you. After you talk with the staff, tell them in your own words what you learned. This helps to make sure the staff has described each thing clearly. It also helps to explain things that may have been confusing. Before going home, make sure you can do these:  I can tell you about my condition.  I can tell you what I can do to help avoid passing the infection to others.  I can tell you what I will do if I have the same signs again, soreness or bleeding in my  genitals, or pain during sex.  Where can I learn more?   Centers for Disease Control and Prevention  https://www.cdc.gov/std/chlamydia/stdfact-chlamydia-detailed.htm   Kids Health  https://kidshealth.org/en/parents/chlamydia.html   NHS Choices  https://www.nhs.uk/conditions/chlamydia/   Last Reviewed Date   2021-03-18  Consumer Information Use and Disclaimer   This information is not specific medical advice and does not replace information you receive from your health care provider. This is only a brief summary of general information. It does NOT include all information about conditions, illnesses, injuries, tests, procedures, treatments, therapies, discharge instructions or life-style choices that may apply to you. You must talk with your health care provider for complete information about your health and treatment options. This information should not be used to decide whether or not to accept your health care providers advice, instructions or recommendations. Only your health care provider has the knowledge and training to provide advice that is right for you.  Copyright   Copyright © 2021 [employer] and its affiliates and/or licensors. All rights reserved.

## 2024-10-28 ENCOUNTER — OFFICE VISIT (OUTPATIENT)
Dept: OBSTETRICS AND GYNECOLOGY | Facility: CLINIC | Age: 34
End: 2024-10-28
Payer: MEDICAID

## 2024-10-28 ENCOUNTER — HOSPITAL ENCOUNTER (EMERGENCY)
Facility: OTHER | Age: 34
Discharge: HOME | End: 2024-10-28
Attending: EMERGENCY MEDICINE
Payer: MEDICAID

## 2024-10-28 VITALS
OXYGEN SATURATION: 99 % | HEART RATE: 70 BPM | TEMPERATURE: 99 F | RESPIRATION RATE: 16 BRPM | DIASTOLIC BLOOD PRESSURE: 107 MMHG | SYSTOLIC BLOOD PRESSURE: 167 MMHG

## 2024-10-28 VITALS — HEIGHT: 60 IN | BODY MASS INDEX: 44.8 KG/M2 | WEIGHT: 228.19 LBS

## 2024-10-28 DIAGNOSIS — R03.0 ELEVATED BLOOD PRESSURE READING: Primary | ICD-10-CM

## 2024-10-28 DIAGNOSIS — N76.0 ACUTE VAGINITIS: Primary | ICD-10-CM

## 2024-10-28 DIAGNOSIS — Z20.2 POTENTIAL EXPOSURE TO STD: ICD-10-CM

## 2024-10-28 DIAGNOSIS — M54.50 ACUTE RIGHT-SIDED LOW BACK PAIN WITHOUT SCIATICA: ICD-10-CM

## 2024-10-28 DIAGNOSIS — R10.2 SUPRAPUBIC ABDOMINAL PAIN: ICD-10-CM

## 2024-10-28 LAB
B-HCG UR QL: NEGATIVE
BILIRUB UR QL STRIP: NEGATIVE
CLARITY UR: CLEAR
COLOR UR: YELLOW
CTP QC/QA: YES
GLUCOSE UR QL STRIP: NEGATIVE
HGB UR QL STRIP: ABNORMAL
KETONES UR QL STRIP: NEGATIVE
LEUKOCYTE ESTERASE UR QL STRIP: NEGATIVE
MICROSCOPIC COMMENT: ABNORMAL
NITRITE UR QL STRIP: NEGATIVE
PH UR STRIP: 7 [PH] (ref 5–8)
PROT UR QL STRIP: NEGATIVE
RBC #/AREA URNS HPF: 6 /HPF (ref 0–4)
SP GR UR STRIP: 1.02 (ref 1–1.03)
SQUAMOUS #/AREA URNS HPF: 1 /HPF
URN SPEC COLLECT METH UR: ABNORMAL
UROBILINOGEN UR STRIP-ACNC: NEGATIVE EU/DL
WBC #/AREA URNS HPF: 0 /HPF (ref 0–5)

## 2024-10-28 PROCEDURE — 99212 OFFICE O/P EST SF 10 MIN: CPT | Mod: PBBFAC | Performed by: FAMILY MEDICINE

## 2024-10-28 PROCEDURE — 99999 PR PBB SHADOW E&M-EST. PATIENT-LVL II: CPT | Mod: PBBFAC,,, | Performed by: FAMILY MEDICINE

## 2024-10-28 PROCEDURE — 25000003 PHARM REV CODE 250

## 2024-10-28 PROCEDURE — 99204 OFFICE O/P NEW MOD 45 MIN: CPT | Mod: S$PBB,,, | Performed by: FAMILY MEDICINE

## 2024-10-28 PROCEDURE — 1160F RVW MEDS BY RX/DR IN RCRD: CPT | Mod: CPTII,,, | Performed by: FAMILY MEDICINE

## 2024-10-28 PROCEDURE — 81025 URINE PREGNANCY TEST: CPT

## 2024-10-28 PROCEDURE — 3008F BODY MASS INDEX DOCD: CPT | Mod: CPTII,,, | Performed by: FAMILY MEDICINE

## 2024-10-28 PROCEDURE — 0352U VAGINOSIS SCREEN BY DNA PROBE: CPT | Performed by: FAMILY MEDICINE

## 2024-10-28 PROCEDURE — 99284 EMERGENCY DEPT VISIT MOD MDM: CPT | Mod: 27

## 2024-10-28 PROCEDURE — 81000 URINALYSIS NONAUTO W/SCOPE: CPT

## 2024-10-28 PROCEDURE — 87491 CHLMYD TRACH DNA AMP PROBE: CPT | Performed by: FAMILY MEDICINE

## 2024-10-28 PROCEDURE — 87591 N.GONORRHOEAE DNA AMP PROB: CPT | Performed by: FAMILY MEDICINE

## 2024-10-28 PROCEDURE — 1159F MED LIST DOCD IN RCRD: CPT | Mod: CPTII,,, | Performed by: FAMILY MEDICINE

## 2024-10-28 RX ORDER — AMLODIPINE BESYLATE 10 MG/1
10 TABLET ORAL DAILY
Qty: 60 TABLET | Refills: 0 | Status: SHIPPED | OUTPATIENT
Start: 2024-10-28 | End: 2024-12-27

## 2024-10-28 RX ORDER — AMLODIPINE BESYLATE 5 MG/1
10 TABLET ORAL
Status: COMPLETED | OUTPATIENT
Start: 2024-10-28 | End: 2024-10-28

## 2024-10-28 RX ORDER — METHOCARBAMOL 500 MG/1
500 TABLET, FILM COATED ORAL
Status: COMPLETED | OUTPATIENT
Start: 2024-10-28 | End: 2024-10-28

## 2024-10-28 RX ORDER — NAPROXEN 500 MG/1
500 TABLET ORAL
Status: COMPLETED | OUTPATIENT
Start: 2024-10-28 | End: 2024-10-28

## 2024-10-28 RX ORDER — NAPROXEN 500 MG/1
500 TABLET ORAL 2 TIMES DAILY WITH MEALS
Qty: 20 TABLET | Refills: 0 | Status: SHIPPED | OUTPATIENT
Start: 2024-10-28

## 2024-10-28 RX ORDER — METHOCARBAMOL 500 MG/1
500 TABLET, FILM COATED ORAL 4 TIMES DAILY
Qty: 21 TABLET | Refills: 0 | Status: SHIPPED | OUTPATIENT
Start: 2024-10-28 | End: 2024-11-02

## 2024-10-28 RX ORDER — METRONIDAZOLE 500 MG/1
500 TABLET ORAL EVERY 12 HOURS
Qty: 14 TABLET | Refills: 0 | Status: SHIPPED | OUTPATIENT
Start: 2024-10-28 | End: 2024-11-04

## 2024-10-28 RX ADMIN — AMLODIPINE BESYLATE 10 MG: 5 TABLET ORAL at 11:10

## 2024-10-28 RX ADMIN — METHOCARBAMOL 500 MG: 500 TABLET ORAL at 11:10

## 2024-10-28 RX ADMIN — NAPROXEN 500 MG: 500 TABLET ORAL at 11:10

## 2024-10-29 LAB
BACTERIAL VAGINOSIS DNA: DETECTED
C TRACH DNA SPEC QL NAA+PROBE: NOT DETECTED
CANDIDA GLABRATA/KRUSEI: NOT DETECTED
CANDIDA RRNA VAG QL PROBE: NOT DETECTED
N GONORRHOEA DNA SPEC QL NAA+PROBE: NOT DETECTED
TRICHOMONAS VAGINALIS: NOT DETECTED

## 2025-04-24 ENCOUNTER — HOSPITAL ENCOUNTER (EMERGENCY)
Facility: HOSPITAL | Age: 35
Discharge: HOME | End: 2025-04-24
Attending: STUDENT IN AN ORGANIZED HEALTH CARE EDUCATION/TRAINING PROGRAM
Payer: MEDICAID

## 2025-04-24 VITALS
RESPIRATION RATE: 16 BRPM | BODY MASS INDEX: 41.54 KG/M2 | OXYGEN SATURATION: 99 % | HEIGHT: 61 IN | TEMPERATURE: 99 F | HEART RATE: 86 BPM | SYSTOLIC BLOOD PRESSURE: 163 MMHG | WEIGHT: 220 LBS | DIASTOLIC BLOOD PRESSURE: 97 MMHG

## 2025-04-24 DIAGNOSIS — R10.9 ABDOMINAL PAIN, UNSPECIFIED ABDOMINAL LOCATION: Primary | ICD-10-CM

## 2025-04-24 LAB
B-HCG UR QL: NEGATIVE
BILIRUB UR QL STRIP.AUTO: NEGATIVE
CLARITY UR: CLEAR
COLOR UR AUTO: YELLOW
CTP QC/QA: YES
GLUCOSE UR QL STRIP: NEGATIVE
HGB UR QL STRIP: ABNORMAL
HOLD SPECIMEN: NORMAL
KETONES UR QL STRIP: NEGATIVE
LEUKOCYTE ESTERASE UR QL STRIP: NEGATIVE
NITRITE UR QL STRIP: NEGATIVE
PH UR STRIP: 7 [PH]
PROT UR QL STRIP: NEGATIVE
SP GR UR STRIP: 1.01
UROBILINOGEN UR STRIP-ACNC: NEGATIVE EU/DL

## 2025-04-24 PROCEDURE — 99282 EMERGENCY DEPT VISIT SF MDM: CPT

## 2025-04-24 PROCEDURE — 81003 URINALYSIS AUTO W/O SCOPE: CPT | Performed by: STUDENT IN AN ORGANIZED HEALTH CARE EDUCATION/TRAINING PROGRAM

## 2025-04-24 PROCEDURE — 81025 URINE PREGNANCY TEST: CPT | Performed by: STUDENT IN AN ORGANIZED HEALTH CARE EDUCATION/TRAINING PROGRAM

## 2025-04-24 RX ORDER — ONDANSETRON HYDROCHLORIDE 2 MG/ML
4 INJECTION, SOLUTION INTRAVENOUS
Status: DISCONTINUED | OUTPATIENT
Start: 2025-04-24 | End: 2025-04-24 | Stop reason: HOSPADM

## 2025-04-24 RX ORDER — MORPHINE SULFATE 4 MG/ML
4 INJECTION, SOLUTION INTRAMUSCULAR; INTRAVENOUS
Refills: 0 | Status: DISCONTINUED | OUTPATIENT
Start: 2025-04-24 | End: 2025-04-24 | Stop reason: HOSPADM

## 2025-04-24 NOTE — ED NOTES
Patient states lower abd pain that radiates in to back x 3 days, reports left shoulder pain x 3 days, reports vomiting

## 2025-04-24 NOTE — ED PROVIDER NOTES
Encounter Date: 4/24/2025       History     Chief Complaint   Patient presents with    Abdominal Pain     Pt c/o RLQ and right flank pain x 2 days.  (+) vomiting.    Pt states she had (+) UPT x 3 at home.  LMP was in February.      HPI  The patient is a 34-year-old female with history of hypertension, GERD, preeclampsia presents for abdominal pain.  The patient states that she is having lower abdominal pain mostly in his suprapubic region but also right lower quadrant.  She at sometimes it radiates to her right flank.  No current radiation.  She states her pain is mostly in the lower middle abdomen.  No dysuria or hematuria.  She denies vaginal discharge.  Prior history of chlamydia within the past year but states that she completed treatment.  She reports associated vomiting for the last 3 days.  She states that she having difficulty tolerating p.o. intake.  Associated with nausea.  Emesis has been nonbloody nonbilious.  No constipation or diarrhea.  No fevers or chills.  No chest pain or shortness of breath.    Of note, patient reported that she had 3 questionable positive urine pregnancy test over the last 3 days.  She states that at home she had 2 pregnancy test that she was concerned might be faintly positive.  She went to urgent care yesterday and had a repeat test done which she was told was negative.    Review of patient's allergies indicates:  No Known Allergies  Past Medical History:   Diagnosis Date    GERD (gastroesophageal reflux disease)     Hypertension     Hypertension affecting pregnancy in second trimester 05/01/2020     Past Surgical History:   Procedure Laterality Date    POSTPARTUM LIGATION OF FALLOPIAN TUBE Bilateral 9/9/2020    Procedure: LIGATION, FALLOPIAN TUBE, POSTPARTUM;  Surgeon: KODI Talley MD;  Location: [facility] L&D;  Service: OB/GYN;  Laterality: Bilateral;     Family History   Problem Relation Name Age of Onset    Colon cancer Neg Hx      Eclampsia Neg Hx      Breast cancer Neg  Hx      Ovarian cancer Neg Hx      Cancer Neg Hx       Social History[1]  Review of Systems  A full ROS was obtained, see HPI for pertinent positives.     Physical Exam     Initial Vitals [04/24/25 0912]   BP Pulse Resp Temp SpO2   (!) 163/97 86 16 98.9 °F (37.2 °C) 99 %      MAP       --         Physical Exam  Constitutional: No acute distress, well-appearing, nontoxic  Respiratory: Non-labored  Cardiovascular: Well perfused, normal rate, regular rhythm  Gastrointestinal: Soft, nondistended, tender to palpation in the epigastrium without guarding as well a the suprapubic region without guarding  Genitourinary: No CVA tenderness  Integumentary: Warm and dry  Musculoskeletal: No deformity  Neurological: Awake and alert  Psychiatric: Cooperative     ED Course   Procedures  Labs Reviewed   URINALYSIS, REFLEX TO URINE CULTURE - Abnormal       Result Value    Color, UA Yellow      Appearance, UA Clear      pH, UA 7.0      Spec Grav UA 1.015      Protein, UA Negative      Glucose, UA Negative      Ketones, UA Negative      Bilirubin, UA Negative      Blood, UA Trace (*)     Nitrites, UA Negative      Urobilinogen, UA Negative      Leukocyte Esterase, UA Negative     WET PREP, GENITAL   GREY TOP URINE HOLD   CBC W/ AUTO DIFFERENTIAL    Narrative:     The following orders were created for panel order CBC auto differential.  Procedure                               Abnormality         Status                     ---------                               -----------         ------                     CBC with Differential[[id number]]                                                        Please view results for these tests on the individual orders.   COMPREHENSIVE METABOLIC PANEL   LIPASE   CBC WITH DIFFERENTIAL   C. TRACHOMATIS/N. GONORRHOEAE BY AMP DNA   POCT URINE PREGNANCY    POC Preg Test, Ur Negative       Acceptable Yes            Imaging Results    None          Medications   ondansetron injection 4 mg (has  no administration in time range)   sodium chloride 0.9% bolus 1,000 mL 1,000 mL (has no administration in time range)   morphine injection 4 mg (has no administration in time range)     Medical Decision Making  The patient is a 34-year-old female here with lower abdominal pain and nausea vomiting.  She has no CVA tenderness or urinary symptoms but has been reporting flank discomfort and suprapubic pain.  Low suspicion for pyelo but will check urinalysis.  She is tender mostly in his suprapubic region so symptoms could be related to cystitis.  Will also perform pelvic exam a rule out PID.  She has also been having nausea and vomiting and has epigastric tenderness on exam so will assess for pancreatitis.  I considered ectopic pregnancy but the patient had x 2 negative urine pregnancy tests here in the ED. Do not suspect ovarian torsion as the patient is comfortable on exam.  Labs, imaging and symptomatic treatment ordered.  Will hydrate with IV fluids.  Anticipate discharge home if workup reassuring.    1101 - I was notified by nursing staff that the patient eloped prior to any further treatment.  Attempts were made to contact the patient via the contact number in her chart however patient did not answer in the phone was no longer in service.    Amount and/or Complexity of Data Reviewed  Labs: ordered. Decision-making details documented in ED Course.  Radiology: ordered.    Risk  Prescription drug management.               ED Course as of 04/24/25 1101   Thu Apr 24, 2025   1016 hCG Qualitative, Urine: Negative [NN]   1034 Two separate UPT checked here and both are negative [NN]   1049 Blood, UA(!): Trace [NN]      ED Course User Index  [NN] Lizbeth Fan MD                           Clinical Impression:  Final diagnoses:  [R10.9] Abdominal pain, unspecified abdominal location (Primary)                   [1]   Social History  Tobacco Use    Smoking status: Never    Smokeless tobacco: Never   Substance Use Topics     Alcohol use: No    Drug use: No        Lizbeth Fan MD  04/24/25 0069

## 2025-04-24 NOTE — ED NOTES
Patient identifiers verified and correct for  MS Guzman  C/C:  Lower abd pain SEE NN  APPEARANCE: awake and alert in NAD. PAIN  10/10  SKIN: warm, dry and intact. No breakdown or bruising.  MUSCULOSKELETAL: Patient moving all extremities spontaneously, no obvious swelling or deformities noted. Ambulates independently.  RESPIRATORY: Denies shortness of breath.Respirations unlabored.   CARDIAC: Denies CP, 2+ distal pulses; no peripheral edema  ABDOMEN: ABdomen round, reports pain to lower abdomen, back pain and vomiting   : voids spontaneously, denies difficulty  Neurologic: AAO x 4; follows commands equal strength in all extremities; denies numbness/tingling. Denies dizziness  Denies new wekaness, left shoulder pain

## 2025-04-24 NOTE — ED NOTES
Patient not in room upon entry for blood draw and pelvic exam setup. Patient's cellular device contacted x2 with no response. MD Mita aware. Will try additional attempt to get in touch with patient.

## 2025-05-13 ENCOUNTER — HOSPITAL ENCOUNTER (EMERGENCY)
Facility: OTHER | Age: 35
Discharge: HOME | End: 2025-05-13
Attending: STUDENT IN AN ORGANIZED HEALTH CARE EDUCATION/TRAINING PROGRAM
Payer: MEDICAID

## 2025-05-13 VITALS
SYSTOLIC BLOOD PRESSURE: 136 MMHG | HEART RATE: 76 BPM | RESPIRATION RATE: 18 BRPM | DIASTOLIC BLOOD PRESSURE: 82 MMHG | OXYGEN SATURATION: 96 % | TEMPERATURE: 99 F

## 2025-05-13 DIAGNOSIS — E87.6 HYPOKALEMIA: Primary | ICD-10-CM

## 2025-05-13 DIAGNOSIS — R10.13 EPIGASTRIC PAIN: ICD-10-CM

## 2025-05-13 LAB
ABSOLUTE EOSINOPHIL (OHS): 0.06 K/UL
ABSOLUTE MONOCYTE (OHS): 0.53 K/UL (ref 0.3–1)
ABSOLUTE NEUTROPHIL COUNT (OHS): 3.66 K/UL (ref 1.8–7.7)
ALBUMIN SERPL BCP-MCNC: 3.6 G/DL (ref 3.5–5.2)
ALP SERPL-CCNC: 87 UNIT/L (ref 40–150)
ALT SERPL W/O P-5'-P-CCNC: 11 UNIT/L (ref 10–44)
ANION GAP (OHS): 10 MMOL/L (ref 8–16)
AST SERPL-CCNC: 15 UNIT/L (ref 11–45)
B-HCG UR QL: NEGATIVE
BASOPHILS # BLD AUTO: 0.03 K/UL
BASOPHILS NFR BLD AUTO: 0.5 %
BILIRUB SERPL-MCNC: 0.3 MG/DL (ref 0.1–1)
BILIRUB UR QL STRIP.AUTO: NEGATIVE
BUN SERPL-MCNC: 10 MG/DL (ref 6–20)
CALCIUM SERPL-MCNC: 8.7 MG/DL (ref 8.7–10.5)
CHLORIDE SERPL-SCNC: 103 MMOL/L (ref 95–110)
CLARITY UR: CLEAR
CO2 SERPL-SCNC: 25 MMOL/L (ref 23–29)
COLOR UR AUTO: YELLOW
CREAT SERPL-MCNC: 0.7 MG/DL (ref 0.5–1.4)
CTP QC/QA: YES
ERYTHROCYTE [DISTWIDTH] IN BLOOD BY AUTOMATED COUNT: 12.3 % (ref 11.5–14.5)
GFR SERPLBLD CREATININE-BSD FMLA CKD-EPI: >60 ML/MIN/1.73/M2
GLUCOSE SERPL-MCNC: 99 MG/DL (ref 70–110)
GLUCOSE UR QL STRIP: NEGATIVE
HCT VFR BLD AUTO: 38.8 % (ref 37–48.5)
HCV AB SERPL QL IA: NEGATIVE
HGB BLD-MCNC: 13 GM/DL (ref 12–16)
HGB UR QL STRIP: ABNORMAL
HIV 1+2 AB+HIV1 P24 AG SERPL QL IA: NEGATIVE
HOLD SPECIMEN: NORMAL
HOLD SPECIMEN: NORMAL
IMM GRANULOCYTES # BLD AUTO: 0.01 K/UL (ref 0–0.04)
IMM GRANULOCYTES NFR BLD AUTO: 0.2 % (ref 0–0.5)
KETONES UR QL STRIP: NEGATIVE
LEUKOCYTE ESTERASE UR QL STRIP: NEGATIVE
LIPASE SERPL-CCNC: 26 U/L (ref 4–60)
LYMPHOCYTES # BLD AUTO: 1.43 K/UL (ref 1–4.8)
MCH RBC QN AUTO: 29.1 PG (ref 27–31)
MCHC RBC AUTO-ENTMCNC: 33.5 G/DL (ref 32–36)
MCV RBC AUTO: 87 FL (ref 82–98)
MICROSCOPIC COMMENT: NORMAL
NITRITE UR QL STRIP: NEGATIVE
NUCLEATED RBC (/100WBC) (OHS): 0 /100 WBC
PH UR STRIP: 7 [PH]
PLATELET # BLD AUTO: 248 K/UL (ref 150–450)
PMV BLD AUTO: 9.6 FL (ref 9.2–12.9)
POTASSIUM SERPL-SCNC: 3.1 MMOL/L (ref 3.5–5.1)
PROT SERPL-MCNC: 7.3 GM/DL (ref 6–8.4)
PROT UR QL STRIP: NEGATIVE
RBC # BLD AUTO: 4.46 M/UL (ref 4–5.4)
RBC #/AREA URNS AUTO: 2 /HPF (ref 0–4)
RELATIVE EOSINOPHIL (OHS): 1 %
RELATIVE LYMPHOCYTE (OHS): 25 % (ref 18–48)
RELATIVE MONOCYTE (OHS): 9.3 % (ref 4–15)
RELATIVE NEUTROPHIL (OHS): 64 % (ref 38–73)
SODIUM SERPL-SCNC: 138 MMOL/L (ref 136–145)
SP GR UR STRIP: 1.01
SQUAMOUS #/AREA URNS AUTO: 1 /HPF
UROBILINOGEN UR STRIP-ACNC: NEGATIVE EU/DL
WBC # BLD AUTO: 5.72 K/UL (ref 3.9–12.7)
WBC #/AREA URNS AUTO: <1 /HPF (ref 0–5)

## 2025-05-13 PROCEDURE — 86803 HEPATITIS C AB TEST: CPT

## 2025-05-13 PROCEDURE — 63600175 PHARM REV CODE 636 W HCPCS: Performed by: STUDENT IN AN ORGANIZED HEALTH CARE EDUCATION/TRAINING PROGRAM

## 2025-05-13 PROCEDURE — 25500020 PHARM REV CODE 255: Performed by: STUDENT IN AN ORGANIZED HEALTH CARE EDUCATION/TRAINING PROGRAM

## 2025-05-13 PROCEDURE — 99285 EMERGENCY DEPT VISIT HI MDM: CPT | Mod: 25

## 2025-05-13 PROCEDURE — 25000003 PHARM REV CODE 250: Performed by: STUDENT IN AN ORGANIZED HEALTH CARE EDUCATION/TRAINING PROGRAM

## 2025-05-13 PROCEDURE — 96374 THER/PROPH/DIAG INJ IV PUSH: CPT

## 2025-05-13 PROCEDURE — 81001 URINALYSIS AUTO W/SCOPE: CPT | Performed by: STUDENT IN AN ORGANIZED HEALTH CARE EDUCATION/TRAINING PROGRAM

## 2025-05-13 PROCEDURE — 85025 COMPLETE CBC W/AUTO DIFF WBC: CPT | Performed by: STUDENT IN AN ORGANIZED HEALTH CARE EDUCATION/TRAINING PROGRAM

## 2025-05-13 PROCEDURE — 83690 ASSAY OF LIPASE: CPT | Performed by: STUDENT IN AN ORGANIZED HEALTH CARE EDUCATION/TRAINING PROGRAM

## 2025-05-13 PROCEDURE — 25000003 PHARM REV CODE 250: Performed by: EMERGENCY MEDICINE

## 2025-05-13 PROCEDURE — 81025 URINE PREGNANCY TEST: CPT

## 2025-05-13 PROCEDURE — 87389 HIV-1 AG W/HIV-1&-2 AB AG IA: CPT

## 2025-05-13 PROCEDURE — 82040 ASSAY OF SERUM ALBUMIN: CPT | Performed by: STUDENT IN AN ORGANIZED HEALTH CARE EDUCATION/TRAINING PROGRAM

## 2025-05-13 PROCEDURE — 96375 TX/PRO/DX INJ NEW DRUG ADDON: CPT

## 2025-05-13 RX ORDER — ONDANSETRON 4 MG/1
4 TABLET, ORALLY DISINTEGRATING ORAL EVERY 6 HOURS PRN
Qty: 15 TABLET | Refills: 0 | Status: SHIPPED | OUTPATIENT
Start: 2025-05-13

## 2025-05-13 RX ORDER — POTASSIUM CHLORIDE 750 MG/1
10 TABLET, EXTENDED RELEASE ORAL DAILY
Qty: 30 TABLET | Refills: 0 | Status: SHIPPED | OUTPATIENT
Start: 2025-05-13

## 2025-05-13 RX ORDER — LIDOCAINE HYDROCHLORIDE 20 MG/ML
15 SOLUTION OROPHARYNGEAL ONCE
Status: COMPLETED | OUTPATIENT
Start: 2025-05-13 | End: 2025-05-13

## 2025-05-13 RX ORDER — ONDANSETRON HYDROCHLORIDE 2 MG/ML
4 INJECTION, SOLUTION INTRAVENOUS
Status: COMPLETED | OUTPATIENT
Start: 2025-05-13 | End: 2025-05-13

## 2025-05-13 RX ORDER — ALUMINUM HYDROXIDE, MAGNESIUM HYDROXIDE, AND SIMETHICONE 1200; 120; 1200 MG/30ML; MG/30ML; MG/30ML
30 SUSPENSION ORAL ONCE
Status: COMPLETED | OUTPATIENT
Start: 2025-05-13 | End: 2025-05-13

## 2025-05-13 RX ORDER — MORPHINE SULFATE 4 MG/ML
4 INJECTION, SOLUTION INTRAMUSCULAR; INTRAVENOUS
Refills: 0 | Status: COMPLETED | OUTPATIENT
Start: 2025-05-13 | End: 2025-05-13

## 2025-05-13 RX ORDER — FAMOTIDINE 10 MG/ML
40 INJECTION, SOLUTION INTRAVENOUS
Status: COMPLETED | OUTPATIENT
Start: 2025-05-13 | End: 2025-05-13

## 2025-05-13 RX ORDER — POTASSIUM CHLORIDE 20 MEQ/1
20 TABLET, EXTENDED RELEASE ORAL
Status: COMPLETED | OUTPATIENT
Start: 2025-05-13 | End: 2025-05-13

## 2025-05-13 RX ADMIN — IOHEXOL 90 ML: 350 INJECTION, SOLUTION INTRAVENOUS at 02:05

## 2025-05-13 RX ADMIN — LIDOCAINE HYDROCHLORIDE 15 ML: 20 SOLUTION ORAL at 01:05

## 2025-05-13 RX ADMIN — ALUMINUM HYDROXIDE, MAGNESIUM HYDROXIDE, AND DIMETHICONE 30 ML: 200; 20; 200 SUSPENSION ORAL at 01:05

## 2025-05-13 RX ADMIN — POTASSIUM CHLORIDE 20 MEQ: 1500 TABLET, EXTENDED RELEASE ORAL at 04:05

## 2025-05-13 RX ADMIN — MORPHINE SULFATE 4 MG: 4 INJECTION INTRAVENOUS at 11:05

## 2025-05-13 RX ADMIN — FAMOTIDINE 40 MG: 10 INJECTION, SOLUTION INTRAVENOUS at 12:05

## 2025-05-13 RX ADMIN — ONDANSETRON 4 MG: 2 INJECTION INTRAMUSCULAR; INTRAVENOUS at 11:05

## 2025-05-13 NOTE — ED NOTES
LOC: The patient is awake, alert, and oriented to self, place, time, and situation. Pt is calm and cooperative. Affect is appropriate.  Speech is appropriate and clear.     APPEARANCE: Patient resting on stretcher; appears very uncomfortable.  Patient is clean and well groomed.    SKIN: The skin is warm but diaphoretic; color consistent with ethnicity.  Patient has normal skin turgor and moist mucus membranes.  Skin intact; no breakdown or bruising noted.     MUSCULOSKELETAL: Patient moving upper and lower extremities without difficulty; denies pain in the extremities. Denies weakness.     RESPIRATORY: Airway is open and patent. Respirations spontaneous, even, easy, and non-labored.  Patient has a normal effort and rate.  No accessory muscle use noted. Denies cough.     CARDIAC: Pt is urgently hypertensive and tachycardic with a HR in the 100's. No peripheral edema noted. No complaints of chest pain.     ABDOMEN: Soft but tender to palpation in the mid abdomen.  No distention noted. Pt reporting mid abdominal pain that radiates into the back; denies nausea, vomiting, diarrhea, or constipation. Pt denies urinary symptoms.    NEUROLOGIC: Eyes open spontaneously.  Behavior appropriate to situation.  Follows commands; facial expression symmetrical.  Purposeful motor response noted; normal sensation in all extremities. Pt denies headache; denies lightheadedness or dizziness; denies visual disturbances; denies loss of balance; denies unilateral weakness.

## 2025-05-13 NOTE — ED PROVIDER NOTES
"Encounter Date: 5/13/2025    SCRIBE #1 NOTE: I, Vane Chacon, am scribing for, and in the presence of,  Isaiah Guzman MD. I have scribed the following portions of the note - Other sections scribed: HPI, PE.       History     Chief Complaint   Patient presents with    Abdominal Pain     Pt complaining of abdominal pain starting in the front of her stomach radiating to the back. Denies  symptoms. Pt tearful and unable to sit still d/t pain. /150 in triage, hasn't taken BP meds for 2 days.      Time seen by provider: 11:29 AM    This is a 34 y.o. female with PMHx of hypertension who presents with complaint of abdominal tenderness. Pt states that the pain began x2 days ago. She describes it as constant and "shooting," and states that it radiates from her epigastric area to her back. She reports associated loss of appetite and chills. She denies any nausea, vomiting, fever, diarrhea, or changes in her bowel patterns. She denies ever having any similar episodes in the past.     This is the extent of the patient's complaints at this time.          The history is provided by the patient. No  was used.     Review of patient's allergies indicates:  No Known Allergies  Past Medical History:   Diagnosis Date    GERD (gastroesophageal reflux disease)     Hypertension     Hypertension affecting pregnancy in second trimester 05/01/2020    Stroke      Past Surgical History:   Procedure Laterality Date    POSTPARTUM LIGATION OF FALLOPIAN TUBE Bilateral 9/9/2020    Procedure: LIGATION, FALLOPIAN TUBE, POSTPARTUM;  Surgeon: KODI Talley MD;  [facility];  Service: OB/GYN;  Laterality: Bilateral;     Family History   Problem Relation Name Age of Onset    Colon cancer Neg Hx      Eclampsia Neg Hx      Breast cancer Neg Hx      Ovarian cancer Neg Hx      Cancer Neg Hx       Social History[1]      Physical Exam     Initial Vitals [05/13/25 1113]   BP Pulse Resp Temp SpO2   (!) 182/150 104 (!) 22 98.6 " °F (37 °C) 100 %      MAP       --         Physical Exam    Nursing note and vitals reviewed.  Constitutional: She appears well-developed and well-nourished. No distress.   HENT:   Head: Normocephalic and atraumatic.   Right Ear: External ear normal.   Left Ear: External ear normal.   Nose: Nose normal. Mouth/Throat: Oropharynx is clear and moist.   Eyes: Conjunctivae and EOM are normal. Pupils are equal, round, and reactive to light.   Neck: Neck supple.   Normal range of motion.  Cardiovascular:  Normal rate, regular rhythm, normal heart sounds and intact distal pulses.           Pulmonary/Chest: Breath sounds normal. No stridor. No respiratory distress. She has no wheezes. She has no rhonchi. She has no rales.   Abdominal: Abdomen is soft. Bowel sounds are normal. There is abdominal tenderness in the epigastric area.   Musculoskeletal:         General: No tenderness or edema. Normal range of motion.      Cervical back: Normal range of motion and neck supple.     Neurological: She is alert and oriented to person, place, and time. She has normal strength. No cranial nerve deficit or sensory deficit.   Skin: Skin is warm and dry. No rash noted.   Psychiatric: She has a normal mood and affect. Thought content normal.         ED Course   Procedures  Labs Reviewed   COMPREHENSIVE METABOLIC PANEL - Abnormal       Result Value    Sodium 138      Potassium 3.1 (*)     Chloride 103      CO2 25      Glucose 99      BUN 10      Creatinine 0.7      Calcium 8.7      Protein Total 7.3      Albumin 3.6      Bilirubin Total 0.3      ALP 87      AST 15      ALT 11      Anion Gap 10      eGFR >60     URINALYSIS, REFLEX TO URINE CULTURE - Abnormal    Color, UA Yellow      Appearance, UA Clear      pH, UA 7.0      Spec Grav UA 1.010      Protein, UA Negative      Glucose, UA Negative      Ketones, UA Negative      Bilirubin, UA Negative      Blood, UA 1+ (*)     Nitrites, UA Negative      Urobilinogen, UA Negative      Leukocyte  Esterase, UA Negative     HEPATITIS C ANTIBODY - Normal    Hep C Ab Interp Negative     HIV 1 / 2 ANTIBODY - Normal    HIV 1/2 Ag/Ab Negative     LIPASE - Normal    Lipase Level 26     CBC WITH DIFFERENTIAL - Normal    WBC 5.72      RBC 4.46      HGB 13.0      HCT 38.8      MCV 87      MCH 29.1      MCHC 33.5      RDW 12.3      Platelet Count 248      MPV 9.6      Nucleated RBC 0      Neut % 64.0      Lymph % 25.0      Mono % 9.3      Eos % 1.0      Basophil % 0.5      Imm Grans % 0.2      Neut # 3.66      Lymph # 1.43      Mono # 0.53      Eos # 0.06      Baso # 0.03      Imm Grans # 0.01     HEP C VIRUS HOLD SPECIMEN    Extra Tube Hold for add-ons.     CBC W/ AUTO DIFFERENTIAL    Narrative:     The following orders were created for panel order CBC W/ AUTO DIFFERENTIAL.  Procedure                               Abnormality         Status                     ---------                               -----------         ------                     CBC with Differential[[id number]]       Normal              Final result                 Please view results for these tests on the individual orders.   GREY TOP URINE HOLD    Extra Tube Hold for add-ons.     URINALYSIS MICROSCOPIC    RBC, UA 2      WBC, UA <1      Squamous Epithelial Cells, UA 1      Microscopic Comment       POCT URINE PREGNANCY    POC Preg Test, Ur Negative       Acceptable Yes            Imaging Results              CT Abdomen Pelvis With IV Contrast NO Oral Contrast (Final result)  Result time 05/13/25 15:57:58      Final result by Roberto Boles Jr., MD (05/13/25 15:57:58)                   Impression:      No acute intra-abdominal process      Electronically signed by: Roberto Rosario Jr  Date:    05/13/2025  Time:    15:57               Narrative:    EXAMINATION:  CT ABDOMEN PELVIS WITH IV CONTRAST    CLINICAL HISTORY:  Epigastric pain;    TECHNIQUE:  Low dose axial images, sagittal and coronal reformations were obtained from the  lung bases to the pubic symphysis following the IV administration of 90 mL of Omnipaque 350    COMPARISON:  01/19/2024    FINDINGS:  Abdomen:    - Lung bases: Clear.    - Liver: Normal.    - Gallbladder and bile ducts: Unremarkable.    - Spleen: Unremarkable.    - Pancreas: Normal.    - Kidneys: No mass or hydronephrosis.    - Adrenals: Unremarkable.    - Retroperitoneum:  No significant adenopathy.    - Vascular: Unremarkable.    - Bowel/mesentery: Unremarkable.    Pelvis:    No pelvic mass, adenopathy, or free fluid.    Bones:  Unremarkable.                                       US Abdomen Limited (Gallbladder) (Final result)  Result time 05/13/25 12:12:23      Final result by Kt Tuttle MD (05/13/25 12:12:23)                   Impression:      No significant sonographic abnormality.      Electronically signed by: Kt Tuttle MD  Date:    05/13/2025  Time:    12:12               Narrative:    EXAMINATION:  US ABDOMEN LIMITED    CLINICAL HISTORY:  Epigastric pain    TECHNIQUE:  Limited ultrasound of the right upper quadrant of the abdomen focused on the biliary system was performed.    COMPARISON:  CT abdomen and pelvis 01/19/2024    FINDINGS:  Gallbladder: No calculi, wall thickening, or pericholecystic fluid.  No sonographic Matthews's sign.    Biliary system: The common duct is not dilated, measuring 2 mm.  No intrahepatic ductal dilatation.    Pancreas: The visualized portions of pancreas appear normal.    Miscellaneous: No upper abdominal ascites and no abnormalities of the visualized liver.  No right hydronephrosis.                                       Medications   morphine injection 4 mg (4 mg Intravenous Given 5/13/25 1148)   ondansetron injection 4 mg (4 mg Intravenous Given 5/13/25 1147)   famotidine (PF) injection 40 mg (40 mg Intravenous Given 5/13/25 1239)   aluminum-magnesium hydroxide-simethicone 200-200-20 mg/5 mL suspension 30 mL (30 mLs Oral Given 5/13/25 1338)     And   LIDOcaine viscous HCl  2% oral solution 15 mL (15 mLs Oral Given 5/13/25 1338)   iohexoL (OMNIPAQUE 350) injection 100 mL (90 mLs Intravenous Given 5/13/25 1405)   potassium chloride SA CR tablet 20 mEq (20 mEq Oral Given 5/13/25 1633)     Medical Decision Making  Presentation consistent with acute epigastric abdominal pain. Abdominal exam without peritoneal signs. No evidence of acute abdomen at this time. Well appearing.   Right upper quadrant ultrasound obtained without evidence of cholelithiasis or cholecystitis.  Patient had ongoing symptoms so CTA of the abdomen was obtained.  This was pending at time of transition of care to oncoming physician.  Given work up have low suspicion for acute hepatobiliary disease (including acute cholecystitis or cholangitis), upper GI bleed, gastric perforation, acute infectious processes (pneumonia, hepatitis, pyelonephritis), atypical appendicitis, vascular catastrophe, bowel obstruction or viscus perforation, or acute coronary syndrome. Presentation not consistent with other acute, emergent causes of abdominal pain at this time. Pain controlled while in department. Patient tolerating PO prior to discharge. Close follow-up with PCP. Return to the ED for reevaluation should symptoms worsen, return, or change in character.      Amount and/or Complexity of Data Reviewed  Labs: ordered. Decision-making details documented in ED Course.  Radiology: ordered.    Risk  OTC drugs.  Prescription drug management.  Diagnosis or treatment significantly limited by social determinants of health.            Scribe Attestation:   Scribe #1: I performed the above scribed service and the documentation accurately describes the services I performed. I attest to the accuracy of the note.        ED Course as of 05/21/25 0657   Tue May 13, 2025   1208 CBC W/ AUTO DIFFERENTIAL  CBC: No significant anemia, platelet disorder, or leukocytosis.   [KB]   1625 CT reassuring, no acute sinister process identified in the abdomen.   Mild hypokalemia, point for repletion, discharge and return to case with the worsening with symptomatic care in the outpatient setting. [TK]      ED Course User Index  [KB] Isaiah Guzman MD  [TK] Salo Dillard MD          Physician Attestation for Scribe: I, Isaiah Guzman MD, reviewed documentation as scribed in my presence, which is both accurate and complete.                   Clinical Impression:  Final diagnoses:  [R10.13] Epigastric pain  [E87.6] Hypokalemia (Primary)          ED Disposition Condition    Discharge Stable          ED Prescriptions       Medication Sig Dispense Start Date End Date Auth. Provider    potassium chloride (KLOR-CON) 10 MEQ TbSR Take 1 tablet (10 mEq total) by mouth once daily. 30 tablet 5/13/2025 -- Salo Dillard MD    ondansetron (ZOFRAN-ODT) 4 MG TbDL Take 1 tablet (4 mg total) by mouth every 6 (six) hours as needed. 15 tablet 5/13/2025 -- Salo Dillard MD          Follow-up Information       Follow up With Specialties Details Why Contact Info    Meredith Escoto NP Internal Medicine Call in 2 days If symptoms worsen, For a follow up visit about today [address] [place] [address]  [phone]                 [1]   Social History  Tobacco Use    Smoking status: Never    Smokeless tobacco: Never   Substance Use Topics    Alcohol use: No    Drug use: No        Isaiah Guzman MD  05/21/25 0662

## 2025-05-13 NOTE — ED NOTES
Pt awake and alert; resting quietly on stretcher.  Pt remains on continuous pulse ox monitoring with non-invasive blood pressure to cycle every 30 minutes.  Pt remains hypertensive though a significant improvement VS stable. Pt continues to rate pain as a 5/10 at this time; no acute distress observed.  Pt denies restroom needs at this time; is able to reposition self on stretcher. Bed locked in lowest position; side rails up and locked x 2; call light, bedside table, and personal belongings within reach. Room assessed for safety measures and cleanliness; no action needed at this time. Plan of care discussed; awaiting on CT results. Pt instructed to alert nurse for assistance and before attempting to get out of bed; verbalizes understanding. Pt denies needs or complaints at this time; monitoring ongoing.

## 2025-05-13 NOTE — Clinical Note
"Macie Crawford" Megan was seen and treated in our emergency department on 5/13/2025.  She may return to work on 05/15/2025.       If you have any questions or concerns, please don't hesitate to call.      Isaiah Guzman MD"

## 2025-05-13 NOTE — DISCHARGE INSTRUCTIONS
Mrs. Guzman,    Your CT and ultrasound both look great!  You are a little low on potassium, eat avocados, bananas or cooked tomatoes if you want to replete naturally or take the supplement provided.     Thank you for letting me care for you today! It was nice meeting you, and I hope you feel better soon.   If you would like access to your chart and what was done today please utilize the [facility].   Please come back to [facility] for all of your future medical needs.    Our goal in the emergency department is to always give you outstanding care and exceptional service. You may receive a survey by mail or e-mail in the next week regarding your experience in our ED. We would greatly appreciate you completing and returning the survey. Your feedback provides us with a way to recognize our staff who give very good care and it helps us learn how to improve when your experience was below our aspiration of excellence.     Sincerely,    Salo Dillard MD  Board Certified Emergency Physician

## 2025-05-13 NOTE — ED TRIAGE NOTES
Pt presents to the ER with complaints of mid abdominal pain that radiates around into the back for the past two days that is constant and progressively getting worse. Pt denies N/V/D or constipation; denies urinary symptoms. Pt is urgently hypertensive; has not taken her BP meds for several months.

## 2025-05-13 NOTE — ED NOTES
Pt resting quietly on stretcher; reporting significant relief in pain since receiving medication; currently rating pain as a 5/10. Pt updated on wait time for test results.  Bed locked in lowest position; side rails up and locked x 2; call light, bedside table, and personal belongings within reach; monitoring ongoing.

## 2025-06-02 ENCOUNTER — OFFICE VISIT (OUTPATIENT)
Dept: URGENT CARE | Facility: CLINIC | Age: 35
End: 2025-06-02
Payer: MEDICAID

## 2025-06-02 VITALS
DIASTOLIC BLOOD PRESSURE: 96 MMHG | BODY MASS INDEX: 41.21 KG/M2 | OXYGEN SATURATION: 98 % | WEIGHT: 218.25 LBS | HEIGHT: 61 IN | TEMPERATURE: 99 F | HEART RATE: 92 BPM | SYSTOLIC BLOOD PRESSURE: 144 MMHG | RESPIRATION RATE: 16 BRPM

## 2025-06-02 DIAGNOSIS — B00.9 HSV (HERPES SIMPLEX VIRUS) INFECTION: Primary | ICD-10-CM

## 2025-06-02 DIAGNOSIS — Z20.2 POSSIBLE EXPOSURE TO STD: ICD-10-CM

## 2025-06-02 DIAGNOSIS — K13.79 OTHER LESIONS OF ORAL MUCOSA: ICD-10-CM

## 2025-06-02 LAB
HAV IGM SERPL QL IA: NORMAL
HBV CORE IGM SERPL QL IA: NORMAL
HBV SURFACE AG SERPL QL IA: NORMAL
HCV AB SERPL QL IA: NORMAL
HIV 1+2 AB+HIV1 P24 AG SERPL QL IA: NORMAL
T PALLIDUM IGG+IGM SER QL: NORMAL

## 2025-06-02 PROCEDURE — 87389 HIV-1 AG W/HIV-1&-2 AB AG IA: CPT

## 2025-06-02 PROCEDURE — 80074 ACUTE HEPATITIS PANEL: CPT

## 2025-06-02 PROCEDURE — 99214 OFFICE O/P EST MOD 30 MIN: CPT | Mod: S$GLB,,,

## 2025-06-02 PROCEDURE — 86696 HERPES SIMPLEX TYPE 2 TEST: CPT

## 2025-06-02 PROCEDURE — 86593 SYPHILIS TEST NON-TREP QUANT: CPT

## 2025-06-02 RX ORDER — VALACYCLOVIR HYDROCHLORIDE 1 G/1
1000 TABLET, FILM COATED ORAL EVERY 12 HOURS
Qty: 14 TABLET | Refills: 0 | Status: SHIPPED | OUTPATIENT
Start: 2025-06-02 | End: 2025-06-09

## 2025-06-03 ENCOUNTER — RESULTS FOLLOW-UP (OUTPATIENT)
Dept: URGENT CARE | Facility: CLINIC | Age: 35
End: 2025-06-03

## 2025-06-03 LAB
HSV1 IGG SERPL QL IA: POSITIVE
HSV2 IGG SERPL QL IA: POSITIVE

## 2025-07-08 ENCOUNTER — ON-DEMAND VIRTUAL (OUTPATIENT)
Dept: URGENT CARE | Facility: CLINIC | Age: 35
End: 2025-07-08
Payer: MEDICAID

## 2025-07-08 DIAGNOSIS — R30.0 DYSURIA: Primary | ICD-10-CM

## 2025-07-08 PROCEDURE — 98004 SYNCH AUDIO-VIDEO EST SF 10: CPT | Mod: 95,,, | Performed by: NURSE PRACTITIONER

## 2025-07-08 RX ORDER — FLUCONAZOLE 150 MG/1
150 TABLET ORAL DAILY
Qty: 2 TABLET | Refills: 0 | Status: SHIPPED | OUTPATIENT
Start: 2025-07-08 | End: 2025-07-10

## 2025-07-08 RX ORDER — NITROFURANTOIN 25; 75 MG/1; MG/1
100 CAPSULE ORAL EVERY 12 HOURS
Qty: 14 CAPSULE | Refills: 0 | Status: SHIPPED | OUTPATIENT
Start: 2025-07-08 | End: 2025-07-15

## 2025-07-08 NOTE — PROGRESS NOTES
Subjective:      Patient ID: Macie Guzman is a 35 y.o. female.    Vitals:  vitals were not taken for this visit.     Chief Complaint: Dysuria and Vaginal Discharge      Visit Type: TELE AUDIOVISUAL - This visit was conducted virtually based on  subjective information and limited objective exam    Present with the patient at the time of consultation: TELEMED PRESENT WITH PATIENT: None  LOCATION OF PATIENT [facility]   Two patient identifiers used to verify patient- saying out date of birth and full name.       Past Medical History:   Diagnosis Date    GERD (gastroesophageal reflux disease)     Hypertension     Hypertension affecting pregnancy in second trimester 05/01/2020    Stroke      Past Surgical History:   Procedure Laterality Date    POSTPARTUM LIGATION OF FALLOPIAN TUBE Bilateral 9/9/2020    Procedure: LIGATION, FALLOPIAN TUBE, POSTPARTUM;  Surgeon: KODI Talley MD;  Location: [facility];  Service: OB/GYN;  Laterality: Bilateral;     Review of patient's allergies indicates:  No Known Allergies  Medications Ordered Prior to Encounter[1]  Family History   Problem Relation Name Age of Onset    Colon cancer Neg Hx      Eclampsia Neg Hx      Breast cancer Neg Hx      Ovarian cancer Neg Hx      Cancer Neg Hx         Medications Ordered                [id number] [place] - [address]  AT [facility] [address]   [address] [place] [address]    Telephone: [phone]   Fax: [phone]   Hours: Open 24 hours                         E-Prescribed (2 of 2)              fluconazole (DIFLUCAN) 150 MG Tab    Sig: Take 1 tablet (150 mg total) by mouth once daily. for 2 doses       Start: 7/8/25     Quantity: 2 tablet Refills: 0                         nitrofurantoin, macrocrystal-monohydrate, (MACROBID) 100 MG capsule    Sig: Take 1 capsule (100 mg total) by mouth every 12 (twelve) hours. for 7 days       Start: 7/8/25     Quantity: 14 capsule Refills: 0                            Ohs Peq Odvv Intake    7/8/2025 12:26 PM CDT - Filed by Patient   What is your current physical address in the event of a medical emergency? [address]   Are you able to take your vital signs? No   Please attach any relevant images or files    Is your employer contracted with [facility]? No         34 yo female complains of burning after urination, vulva itching/burning and vaginal discharge. She notes concern for UTI. Denies concern for STI. No hematuria, flank pain or abdominal pain. Denies recent antibiotics. LMP 6/25/25.           Constitution: Negative. Negative for fever.   HENT:  Negative for sore throat.    Eyes: Negative.    Respiratory:  Negative for cough and shortness of breath.    Genitourinary:  Positive for dysuria and vaginal discharge.        Objective:   The physical exam was conducted virtually.    AAO x 3 ; no acute distress noted; appearance normal; mood and behavior normal; thought process normal  Head- normocephalic  Nose- appears normal, no discharge or erythema  Eyes- pupils appear normal in size, no drainage, no erythema  Ears- normal appearing; no discharge, no erythema  Mouth- appears normal  Oropharynx- no erythema, lesions  Lungs- breathing at a normal rate, no acute distress noted  Heart- no reports of tachycardia, palpitations, chest pain  Abdomen- non distended, non tender reported by patient  Skin- warm and dry, no erythema or edema noted by patient or visualized        Assessment:     1. Dysuria        Plan:     Increase hydration  Take 1 diflucan prior to starting antibiotics. After completing 7 day course of macrobid, take last dose of diflucan for yeast. Follow up with primary care in 1 week.    Thank you for choosing [facility]!    Our goal in the [facility] is to always provide outstanding medical care. You may receive a survey by mail or e-mail in the next week regarding your experience today. We would  greatly appreciate you completing and returning the survey. Your feedback provides us with a way to recognize our staff who provide very good care, and it helps us learn how to improve when your experience was below our aspiration of excellence.         We appreciate you trusting us with your medical care. We hope you feel better soon. We will be happy to take care of you for all of your future medical needs.    You must understand that you've received an Urgent Care treatment only and that you may be released before all your medical problems are known or treated. You, the patient, will arrange for follow up care as instructed.    Follow up with your PCP or specialty clinic as directed in the next 1-2 weeks if not improved or as needed.  You can call [phone] to schedule an appointment with the appropriate provider.    If your condition worsens we recommend that you receive another evaluation in person, with your primary care provider, urgent care or at the emergency room immediately or contact your primary medical clinics after hours call service to discuss your concerns.         Dysuria  -     nitrofurantoin, macrocrystal-monohydrate, (MACROBID) 100 MG capsule; Take 1 capsule (100 mg total) by mouth every 12 (twelve) hours. for 7 days  Dispense: 14 capsule; Refill: 0  -     fluconazole (DIFLUCAN) 150 MG Tab; Take 1 tablet (150 mg total) by mouth once daily. for 2 doses  Dispense: 2 tablet; Refill: 0                          [1]   Current Outpatient Medications on File Prior to Visit   Medication Sig Dispense Refill    amLODIPine (NORVASC) 10 MG tablet Take 1 tablet (10 mg total) by mouth once daily. 60 tablet 0    naproxen (NAPROSYN) 500 MG tablet Take 1 tablet (500 mg total) by mouth 2 (two) times daily with meals. (Patient not taking: Reported on 6/2/2025) 20 tablet 0    ondansetron (ZOFRAN-ODT) 4 MG TbDL Take 1 tablet (4 mg total) by mouth every 6 (six) hours as needed. (Patient not taking: Reported on  6/2/2025) 15 tablet 0    potassium chloride (KLOR-CON) 10 MEQ TbSR Take 1 tablet (10 mEq total) by mouth once daily. (Patient not taking: Reported on 6/2/2025) 30 tablet 0    valACYclovir (VALTREX) 1000 MG tablet Take 1 tablet (1,000 mg total) by mouth every 12 (twelve) hours. for 7 days 14 tablet 0     No current facility-administered medications on file prior to visit.

## 2025-07-10 ENCOUNTER — HOSPITAL ENCOUNTER (EMERGENCY)
Facility: HOSPITAL | Age: 35
Discharge: HOME | End: 2025-07-10
Attending: EMERGENCY MEDICINE
Payer: MEDICAID

## 2025-07-10 VITALS
TEMPERATURE: 98 F | DIASTOLIC BLOOD PRESSURE: 91 MMHG | OXYGEN SATURATION: 100 % | HEIGHT: 61 IN | BODY MASS INDEX: 41.54 KG/M2 | HEART RATE: 75 BPM | WEIGHT: 220 LBS | RESPIRATION RATE: 16 BRPM | SYSTOLIC BLOOD PRESSURE: 153 MMHG

## 2025-07-10 DIAGNOSIS — R07.9 CHEST PAIN: ICD-10-CM

## 2025-07-10 DIAGNOSIS — R07.9 CHEST PAIN, UNSPECIFIED TYPE: Primary | ICD-10-CM

## 2025-07-10 LAB
ABSOLUTE EOSINOPHIL (OHS): 0.12 K/UL
ABSOLUTE MONOCYTE (OHS): 0.78 K/UL (ref 0.3–1)
ABSOLUTE NEUTROPHIL COUNT (OHS): 8.65 K/UL (ref 1.8–7.7)
ALBUMIN SERPL BCP-MCNC: 4 G/DL (ref 3.5–5.2)
ALP SERPL-CCNC: 79 UNIT/L (ref 40–150)
ALT SERPL W/O P-5'-P-CCNC: 11 UNIT/L (ref 10–44)
ANION GAP (OHS): 11 MMOL/L (ref 8–16)
AST SERPL-CCNC: 14 UNIT/L (ref 11–45)
B-HCG UR QL: NEGATIVE
BASOPHILS # BLD AUTO: 0.05 K/UL
BASOPHILS NFR BLD AUTO: 0.4 %
BILIRUB SERPL-MCNC: 0.4 MG/DL (ref 0.1–1)
BNP SERPL-MCNC: <10 PG/ML (ref 0–99)
BUN SERPL-MCNC: 10 MG/DL (ref 6–20)
CALCIUM SERPL-MCNC: 8.9 MG/DL (ref 8.7–10.5)
CHLORIDE SERPL-SCNC: 102 MMOL/L (ref 95–110)
CO2 SERPL-SCNC: 24 MMOL/L (ref 23–29)
CREAT SERPL-MCNC: 0.7 MG/DL (ref 0.5–1.4)
CTP QC/QA: YES
ERYTHROCYTE [DISTWIDTH] IN BLOOD BY AUTOMATED COUNT: 13.1 % (ref 11.5–14.5)
GFR SERPLBLD CREATININE-BSD FMLA CKD-EPI: >60 ML/MIN/1.73/M2
GLUCOSE SERPL-MCNC: 91 MG/DL (ref 70–110)
HCT VFR BLD AUTO: 39.3 % (ref 37–48.5)
HGB BLD-MCNC: 12.8 GM/DL (ref 12–16)
IMM GRANULOCYTES # BLD AUTO: 0.03 K/UL (ref 0–0.04)
IMM GRANULOCYTES NFR BLD AUTO: 0.3 % (ref 0–0.5)
LIPASE SERPL-CCNC: 17 U/L (ref 4–60)
LYMPHOCYTES # BLD AUTO: 1.72 K/UL (ref 1–4.8)
MCH RBC QN AUTO: 29 PG (ref 27–31)
MCHC RBC AUTO-ENTMCNC: 32.6 G/DL (ref 32–36)
MCV RBC AUTO: 89 FL (ref 82–98)
NUCLEATED RBC (/100WBC) (OHS): 0 /100 WBC
OHS QRS DURATION: 86 MS
OHS QTC CALCULATION: 459 MS
PLATELET # BLD AUTO: 296 K/UL (ref 150–450)
PMV BLD AUTO: 9.7 FL (ref 9.2–12.9)
POTASSIUM SERPL-SCNC: 3.4 MMOL/L (ref 3.5–5.1)
PROT SERPL-MCNC: 7.8 GM/DL (ref 6–8.4)
RBC # BLD AUTO: 4.42 M/UL (ref 4–5.4)
RELATIVE EOSINOPHIL (OHS): 1.1 %
RELATIVE LYMPHOCYTE (OHS): 15.2 % (ref 18–48)
RELATIVE MONOCYTE (OHS): 6.9 % (ref 4–15)
RELATIVE NEUTROPHIL (OHS): 76.1 % (ref 38–73)
SODIUM SERPL-SCNC: 137 MMOL/L (ref 136–145)
TROPONIN I SERPL HS-MCNC: 5 NG/L
TROPONIN I SERPL HS-MCNC: 6 NG/L
WBC # BLD AUTO: 11.35 K/UL (ref 3.9–12.7)

## 2025-07-10 PROCEDURE — 94761 N-INVAS EAR/PLS OXIMETRY MLT: CPT

## 2025-07-10 PROCEDURE — 93005 ELECTROCARDIOGRAM TRACING: CPT

## 2025-07-10 PROCEDURE — 25000003 PHARM REV CODE 250

## 2025-07-10 PROCEDURE — 85025 COMPLETE CBC W/AUTO DIFF WBC: CPT

## 2025-07-10 PROCEDURE — 25000242 PHARM REV CODE 250 ALT 637 W/ HCPCS

## 2025-07-10 PROCEDURE — 99285 EMERGENCY DEPT VISIT HI MDM: CPT | Mod: 25

## 2025-07-10 PROCEDURE — 84484 ASSAY OF TROPONIN QUANT: CPT

## 2025-07-10 PROCEDURE — 83690 ASSAY OF LIPASE: CPT

## 2025-07-10 PROCEDURE — 93010 ELECTROCARDIOGRAM REPORT: CPT | Mod: ,,, | Performed by: INTERNAL MEDICINE

## 2025-07-10 PROCEDURE — 81025 URINE PREGNANCY TEST: CPT

## 2025-07-10 PROCEDURE — 80053 COMPREHEN METABOLIC PANEL: CPT

## 2025-07-10 PROCEDURE — 83880 ASSAY OF NATRIURETIC PEPTIDE: CPT

## 2025-07-10 RX ORDER — ACETAMINOPHEN 500 MG
1000 TABLET ORAL
Status: COMPLETED | OUTPATIENT
Start: 2025-07-10 | End: 2025-07-10

## 2025-07-10 RX ORDER — NITROGLYCERIN 0.4 MG/1
0.4 TABLET SUBLINGUAL
Status: COMPLETED | OUTPATIENT
Start: 2025-07-10 | End: 2025-07-10

## 2025-07-10 RX ADMIN — NITROGLYCERIN 0.4 MG: 0.4 TABLET, ORALLY DISINTEGRATING SUBLINGUAL at 05:07

## 2025-07-10 RX ADMIN — ACETAMINOPHEN 1000 MG: 500 TABLET ORAL at 05:07

## 2025-07-10 NOTE — ED NOTES
Assumed care of patient at this time. Pt arrive to ED with complaint of chest pain.Pt refuse hospital gown and currently lying in stretcher. Vital signs are stable, provided pt with warm blanket. Pt denied restroom use. No other complaints from pt at this time.    Review of patient's allergies indicates:  No Known Allergies  Past Medical History:   Diagnosis Date    GERD (gastroesophageal reflux disease)     Hypertension     Hypertension affecting pregnancy in second trimester 05/01/2020    Stroke

## 2025-07-10 NOTE — ED NOTES
Bed: CCR 02  Expected date:   Expected time:   Means of arrival:   Comments:  1   Care Management Follow Up    Length of Stay (days): 59    Expected Discharge Date: 09/26/2022     Concerns to be Addressed:       Patient plan of care discussed at interdisciplinary rounds: Yes    Anticipated Discharge Disposition:TCU   Anticipated Discharge Services:    Anticipated Discharge DME:    Patient/Family in Agreement with the Plan: yes    Referrals Placed by CM/SW:  SW sent updated therapy and progress notes [facility] requesting assessment and return call.    Additional Information:  Pt  Has started to participate in therapy and referrals sent to above TCU's with updated notes.    WILEY King  [facility]  Care Transitions  [phone]

## 2025-07-10 NOTE — PROVIDER PROGRESS NOTES - EMERGENCY DEPT.
Encounter Date: 7/10/2025    ED Physician Progress Notes          ED Physician Hand-off Note:    ED Course: I assumed care of patient from off-going ED physician, Dr. Green/Dr. Michele.  Briefly, Patient is a 35-year-old female here for chest discomfort.    Labs unremarkable, initial troponin negative.    EKG reviewed by me, normal sinus rhythm at 87 with normal intervals, normal axis, no acute ischemic changes    At the time of signout plan was pending delta trop.    Disposition:  Discharge    Patient comfortable with plan. Patient counseled regarding exam, results, diagnosis, treatment, and plan.    Impression:     Final diagnoses:  [R07.9] Chest pain  [R07.9] Chest pain, unspecified type (Primary)    ELLIE Sullivan MD  Staff ED Physician  07/10/2025 7:35 AM

## 2025-07-10 NOTE — ED PROVIDER NOTES
Encounter Date: 7/10/2025       History     Chief Complaint   Patient presents with    Chest Pain     Pt states chest pain that started earlier last night, leg pain and inner ear discomfort to her right ear     Patient is a 35-year-old female with past medical history of high retention and history of stroke that presents to emergency department with chest pain.  Patient states that she was at work at rest sitting at her desk when she is began to have sudden onset chest pressure.  State that the pressures constant as not relieve.  Does appreciate radiation to jaw and arms.  Patient has not had symptoms like this before.  States that she does have history of GERD and takes Mylanta, normally improves quickly after taking them Mylanta.  Today there was no improvement.  She took a double dose of her blood pressure medication because her blood pressure was high today.    Denies fever, denies chills, denies phlegm production or cough.  Denies vomiting        Review of patient's allergies indicates:  No Known Allergies  Past Medical History:   Diagnosis Date    GERD (gastroesophageal reflux disease)     Hypertension     Hypertension affecting pregnancy in second trimester 05/01/2020    Stroke      Past Surgical History:   Procedure Laterality Date    POSTPARTUM LIGATION OF FALLOPIAN TUBE Bilateral 9/9/2020    Procedure: LIGATION, FALLOPIAN TUBE, POSTPARTUM;  Surgeon: KODI Talley MD;  Location: [facility] L&D;  Service: OB/GYN;  Laterality: Bilateral;     Family History   Problem Relation Name Age of Onset    Colon cancer Neg Hx      Eclampsia Neg Hx      Breast cancer Neg Hx      Ovarian cancer Neg Hx      Cancer Neg Hx       Social History[1]  Review of Systems  ROS negative except as noted in HPI    Physical Exam     Initial Vitals [07/10/25 0350]   BP Pulse Resp Temp SpO2   (!) 176/100 92 18 97.9 °F (36.6 °C) 100 %      MAP       --         Physical Exam    Nursing note and vitals reviewed.  Constitutional: She appears  well-developed. No distress.   HENT:   Head: Normocephalic and atraumatic.   Eyes: Conjunctivae are normal.   Cardiovascular:  Normal rate.           No murmur heard.  Pulmonary/Chest: Breath sounds normal. No respiratory distress.   Abdominal: Abdomen is soft. She exhibits no distension.   Musculoskeletal:         General: No tenderness or edema. Normal range of motion.     Neurological: She is alert and oriented to person, place, and time. She has normal strength. No cranial nerve deficit.   Skin: Skin is warm. Capillary refill takes less than 2 seconds.   Psychiatric: She has a normal mood and affect.         ED Course   Procedures  Labs Reviewed   COMPREHENSIVE METABOLIC PANEL - Abnormal       Result Value    Sodium 137      Potassium 3.4 (*)     Chloride 102      CO2 24      Glucose 91      BUN 10      Creatinine 0.7      Calcium 8.9      Protein Total 7.8      Albumin 4.0      Bilirubin Total 0.4      ALP 79      AST 14      ALT 11      Anion Gap 11      eGFR >60     CBC WITH DIFFERENTIAL - Abnormal    WBC 11.35      RBC 4.42      HGB 12.8      HCT 39.3      MCV 89      MCH 29.0      MCHC 32.6      RDW 13.1      Platelet Count 296      MPV 9.7      Nucleated RBC 0      Neut % 76.1 (*)     Lymph % 15.2 (*)     Mono % 6.9      Eos % 1.1      Basophil % 0.4      Imm Grans % 0.3      Neut # 8.65 (*)     Lymph # 1.72      Mono # 0.78      Eos # 0.12      Baso # 0.05      Imm Grans # 0.03     TROPONIN I HIGH SENSITIVITY - Normal    Troponin High Sensitive 6     B-TYPE NATRIURETIC PEPTIDE - Normal    BNP <10     LIPASE - Normal    Lipase Level 17     CBC W/ AUTO DIFFERENTIAL    Narrative:     The following orders were created for panel order CBC auto differential.  Procedure                               Abnormality         Status                     ---------                               -----------         ------                     CBC with Differential[[id number]]       Abnormal            Final result                  Please view results for these tests on the individual orders.   TROPONIN I HIGH SENSITIVITY   POCT URINE PREGNANCY    POC Preg Test, Ur Negative       Acceptable Yes            Imaging Results              X-Ray Chest AP Portable (Final result)  Result time 07/10/25 05:20:35      Final result by Cesar Sousa MD (07/10/25 05:20:35)                   Impression:      Mild prominence of interstitial markings in both lungs, which is likely exacerbated secondary a hypoexpansion, though may be seen in setting of interstitial pulmonary edema.  Correlate clinically and consider follow-up films.    Electronically signed by resident: Tanner Sanchez  Date:    07/10/2025  Time:    04:34    Electronically signed by: Cesar Sousa  Date:    07/10/2025  Time:    05:20               Narrative:    EXAMINATION:  XR CHEST AP PORTABLE    CLINICAL HISTORY:  Chest Pain    TECHNIQUE:  Single frontal view of the chest was performed.    COMPARISON:  CXR 12/01/2023    FINDINGS:  The lungs are symmetrically hypoexpanded.  The cardiac silhouette is slightly enlarged, overall unchanged.  Trachea is midline.  Increased interstitial markings in the bilateral lung fields with prominence of the pulmonary vasculature.  No focal confluency consolidation. No pleural effusions.  No pneumothorax..  Linear opacity overlying the left lower thorax likely skin fold.  Osseous structures and soft tissues are otherwise unremarkable.                        Preliminary result by Tanner Sanchez MD (07/10/25 04:37:39)                   Impression:      Mild prominence of interstitial markings bilateral lungs, which is likely exacerbated secondary a hypoexpansion, though may be seen in setting of interstitial edema.    Electronically signed by resident: Tanner Sanchez  Date:    07/10/2025  Time:    04:34                 Narrative:    EXAMINATION:  XR CHEST AP PORTABLE    CLINICAL HISTORY:  Chest Pain;    TECHNIQUE:  Single frontal view of the chest was  performed.    COMPARISON:  CXR 12/01/2023    FINDINGS:  The lungs are symmetrically hypoexpanded.  The cardiac silhouette is slightly enlarged, overall unchanged.  Trachea is midline.  Increased interstitial markings in the bilateral lung fields with prominence of the pulmonary vasculature.  No focal confluency consolidation. No pleural effusions.  No pneumothorax..  Linear opacity overlying the left lower thorax likely skin fold.  Osseous structures and soft tissues are otherwise unremarkable.                                       Medications   acetaminophen tablet 1,000 mg (1,000 mg Oral Given 7/10/25 0506)   nitroGLYCERIN SL tablet 0.4 mg (0.4 mg Sublingual Given 7/10/25 0507)     Medical Decision Making  Patient is a 35-year-old female with past medical history of high retention and history of stroke that presents to emergency department with chest pain.     On initial evaluation patient is, cooperative.  Still reporting chest pressure.  Vitals are significant for hypertension otherwise within normal limits.    Differential for patient's presentation is concerning for possible ACS, pneumonia, no pneumothorax.  Considering other intrathoracic pathology such as pulmonary embolism.  Patient does not have any hypoxia, tachycardia or pleuritic chest pain low risk at this time for PE and able to use PERC rule to rule out a PE.  We will evaluate for other etiologies and treat symptoms.    Patient pending 2nd troponin.  Suspect that she will likely be discharged however vitals pending 2nd troponin.    Amount and/or Complexity of Data Reviewed  Labs: ordered. Decision-making details documented in ED Course.  Radiology: ordered. Decision-making details documented in ED Course.  ECG/medicine tests:  Decision-making details documented in ED Course.    Risk  OTC drugs.  Prescription drug management.               ED Course as of 07/10/25 0709   Thu Jul 10, 2025   0429 X-Ray Chest AP Portable  Independently interpreted by me  demonstrates evidence of large pneumothorax or pulmonary consolidation [TK]   0430 EKG 12-lead  EKG independently interpreted by demonstrates normal sinus rhythm, no ST elevations. [TK]   0548 CBC auto differential(!)  No significant blood line derangement [TK]   0548 Comprehensive metabolic panel(!)  CMP overall reassuring.  No evidence of severe electrolyte dysfunction [TK]      ED Course User Index  [TK] Leny Martinez DO                           Clinical Impression:  Final diagnoses:  [R07.9] Chest pain  [R07.9] Chest pain, unspecified type (Primary)          ED Disposition Condition    Discharge Stable          ED Prescriptions    None       Follow-up Information       Follow up With Specialties Details Why Contact Info    [facility] - Emergency Dept Emergency Medicine  If symptoms worsen [address] [address] [phone]    Meredith Escoto, NP Internal Medicine In 3 weeks  [address] [place] [address]  [phone]                     [1]   Social History  Tobacco Use    Smoking status: Never    Smokeless tobacco: Never   Substance Use Topics    Alcohol use: No    Drug use: No        Leny Martinez DO  Resident  07/10/25 0709

## 2025-07-10 NOTE — DISCHARGE INSTRUCTIONS
Your workup in the emergency department was reassuring and did not demonstrate an emergent cause of chest pain.  Please follow up with your primary care doctor to be evaluated long-term for your symptoms today.

## 2025-07-11 ENCOUNTER — TELEPHONE (OUTPATIENT)
Dept: URGENT CARE | Facility: CLINIC | Age: 35
End: 2025-07-11

## 2025-07-11 ENCOUNTER — PATIENT MESSAGE (OUTPATIENT)
Dept: URGENT CARE | Facility: CLINIC | Age: 35
End: 2025-07-11

## 2025-07-11 ENCOUNTER — ON-DEMAND VIRTUAL (OUTPATIENT)
Dept: URGENT CARE | Facility: CLINIC | Age: 35
End: 2025-07-11
Payer: MEDICAID

## 2025-07-11 DIAGNOSIS — N39.0 URINARY TRACT INFECTION WITHOUT HEMATURIA, SITE UNSPECIFIED: Primary | ICD-10-CM

## 2025-07-11 RX ORDER — DOXYCYCLINE 100 MG/1
100 CAPSULE ORAL 2 TIMES DAILY
Qty: 20 CAPSULE | Refills: 0 | Status: SHIPPED | OUTPATIENT
Start: 2025-07-11 | End: 2025-07-21

## 2025-07-11 NOTE — PATIENT INSTRUCTIONS
PLEASE READ YOUR DISCHARGE INSTRUCTIONS ENTIRELY AS IT CONTAINS IMPORTANT INFORMATION.      Take the antibiotics to completion.     Drink plenty of fluids, wipe front to back, take showers not baths, no scented soaps, wear breathable cotton underwear, urinate after sexual intercourse.     Please go to Urgent Care or the ER for worsening symptoms including fever, worsening flank pain, vomiting, etc.       Please return or see your primary care doctor if you develop new or worsening symptoms.     Please arrange follow up with your primary medical clinic as soon as possible. You must understand that you've received an virtual treatment only and that you may be released before all of your medical problems are known or treated. You, the patient, will arrange for follow up as instructed. If your symptoms worsen or fail to improve you should go to the Emergency Room.  WE CANNOT RULE OUT ALL POSSIBLE CAUSES OF YOUR SYMPTOMS IN THE VIRTUAL SETTING PLEASE GO TO THE ER IF YOU FEELS YOUR CONDITION IS WORSENING OR YOU WOULD LIKE EMERGENT EVALUATION.     Thank you for choosing [facility]!    Our goal in the [facility] to always provide outstanding medical care. You may receive a survey by mail or e-mail in the next week regarding your experience today. We would greatly appreciate you completing and returning the survey. Your feedback provides us with a way to recognize our staff who provide very good care, and it helps us learn how to improve when your experience was below our aspiration of excellence.         We appreciate you trusting us with your medical care. We hope you feel better soon. We will be happy to take care of you for all of your future medical needs.    You must understand that you've received Virtual  treatment only and that you may be released before all your medical problems are known or treated. You, the patient, will arrange for follow up care as instructed.    Follow up with your PCP or  specialty clinic as directed in the next 1-2 weeks if not improved or as needed.  You can call [phone] to schedule an appointment with the appropriate provider.    If your condition worsens we recommend that you receive another evaluation in person, with your primary care provider, urgent care or at the emergency room immediately or contact your primary medical clinics after hours call service to discuss your concerns.

## 2025-07-11 NOTE — TELEPHONE ENCOUNTER
Telephone call to patient, following up recent VPN appointment. Offered assistance in scheduling LAB appt. No answer, fast busy signal. I will send portal message.

## 2025-07-11 NOTE — PROGRESS NOTES
Subjective:      Patient ID: Macie Guzman is a 35 y.o. female.    Vitals:  vitals were not taken for this visit.     Chief Complaint: Dysuria      Visit Type: TELE AUDIOVISUAL    Patient Location: Home     Present with the patient at the time of consultation: TELEMED PRESENT WITH PATIENT: None    Past Medical History:   Diagnosis Date    GERD (gastroesophageal reflux disease)     Hypertension     Hypertension affecting pregnancy in second trimester 05/01/2020    Stroke      Past Surgical History:   Procedure Laterality Date    POSTPARTUM LIGATION OF FALLOPIAN TUBE Bilateral 9/9/2020    Procedure: LIGATION, FALLOPIAN TUBE, POSTPARTUM;  Surgeon: KODI Talley MD;  Location: [facility] L&D;  Service: OB/GYN;  Laterality: Bilateral;     Review of patient's allergies indicates:  No Known Allergies  Medications Ordered Prior to Encounter[1]  Family History   Problem Relation Name Age of Onset    Colon cancer Neg Hx      Eclampsia Neg Hx      Breast cancer Neg Hx      Ovarian cancer Neg Hx      Cancer Neg Hx         Medications Ordered                [id number] - [place] - [facility] [address]  AT [facility] [address]   [address] [place] [address]    Telephone: [phone]   Fax: [phone]   Hours: Open 24 hours                         E-Prescribed (1 of 1)              doxycycline (VIBRAMYCIN) 100 MG Cap    Sig: Take 1 capsule (100 mg total) by mouth 2 (two) times daily. for 10 days       Start: 7/11/25     Quantity: 20 capsule Refills: 0                           Ohs Peq Odvv Intake    7/11/2025  6:33 AM CDT - Filed by Patient   What is your current physical address in the event of a medical emergency? [address]   Are you able to take your vital signs? No   Please attach any relevant images or files    Is your employer contracted with [facility]? No         Patient previously given macrobid for a UTI and states it is not working. States it has never worked for  her. Took Doxy in the past which has worked the best. Continues with burning on urination    Dysuria   Associated symptoms include frequency and urgency. Pertinent negatives include no flank pain or hematuria.       Genitourinary:  Positive for dysuria, frequency and urgency. Negative for urine decreased, flank pain, bladder incontinence, bed wetting, hematuria and history of kidney stones.        Objective:   The physical exam was conducted virtually.  Physical Exam   Constitutional:      Comments:Physical exam done virtually     Abdominal: Normal appearance.   Neurological: She is alert.       Assessment:     1. Urinary tract infection without hematuria, site unspecified        Plan:       Urinary tract infection without hematuria, site unspecified  -     Urinalysis, Reflex to Urine Culture Urine, Clean Catch    Other orders  -     doxycycline (VIBRAMYCIN) 100 MG Cap; Take 1 capsule (100 mg total) by mouth 2 (two) times daily. for 10 days  Dispense: 20 capsule; Refill: 0                          [1]   Current Outpatient Medications on File Prior to Visit   Medication Sig Dispense Refill    amLODIPine (NORVASC) 10 MG tablet Take 1 tablet (10 mg total) by mouth once daily. 60 tablet 0    [] fluconazole (DIFLUCAN) 150 MG Tab Take 1 tablet (150 mg total) by mouth once daily. for 2 doses 2 tablet 0    naproxen (NAPROSYN) 500 MG tablet Take 1 tablet (500 mg total) by mouth 2 (two) times daily with meals. (Patient not taking: Reported on 2025) 20 tablet 0    nitrofurantoin, macrocrystal-monohydrate, (MACROBID) 100 MG capsule Take 1 capsule (100 mg total) by mouth every 12 (twelve) hours. for 7 days 14 capsule 0    ondansetron (ZOFRAN-ODT) 4 MG TbDL Take 1 tablet (4 mg total) by mouth every 6 (six) hours as needed. (Patient not taking: Reported on 2025) 15 tablet 0    potassium chloride (KLOR-CON) 10 MEQ TbSR Take 1 tablet (10 mEq total) by mouth once daily. (Patient not taking: Reported on 2025) 30  tablet 0    valACYclovir (VALTREX) 1000 MG tablet Take 1 tablet (1,000 mg total) by mouth every 12 (twelve) hours. for 7 days 14 tablet 0     No current facility-administered medications on file prior to visit.

## 2025-07-22 ENCOUNTER — LAB VISIT (OUTPATIENT)
Dept: LAB | Facility: HOSPITAL | Age: 35
End: 2025-07-22
Payer: MEDICAID

## 2025-07-22 ENCOUNTER — OFFICE VISIT (OUTPATIENT)
Dept: OBSTETRICS AND GYNECOLOGY | Facility: CLINIC | Age: 35
End: 2025-07-22
Payer: MEDICAID

## 2025-07-22 VITALS
DIASTOLIC BLOOD PRESSURE: 103 MMHG | BODY MASS INDEX: 44.42 KG/M2 | HEIGHT: 61 IN | SYSTOLIC BLOOD PRESSURE: 134 MMHG | WEIGHT: 235.25 LBS

## 2025-07-22 DIAGNOSIS — N89.8 VAGINAL DISCHARGE: Primary | ICD-10-CM

## 2025-07-22 DIAGNOSIS — N89.8 VAGINAL DISCHARGE: ICD-10-CM

## 2025-07-22 DIAGNOSIS — N92.0 MENORRHAGIA WITH REGULAR CYCLE: ICD-10-CM

## 2025-07-22 PROCEDURE — 3008F BODY MASS INDEX DOCD: CPT | Mod: CPTII,,, | Performed by: STUDENT IN AN ORGANIZED HEALTH CARE EDUCATION/TRAINING PROGRAM

## 2025-07-22 PROCEDURE — 99213 OFFICE O/P EST LOW 20 MIN: CPT | Mod: PBBFAC | Performed by: STUDENT IN AN ORGANIZED HEALTH CARE EDUCATION/TRAINING PROGRAM

## 2025-07-22 PROCEDURE — 36415 COLL VENOUS BLD VENIPUNCTURE: CPT

## 2025-07-22 PROCEDURE — 99999 PR PBB SHADOW E&M-EST. PATIENT-LVL III: CPT | Mod: PBBFAC,,, | Performed by: STUDENT IN AN ORGANIZED HEALTH CARE EDUCATION/TRAINING PROGRAM

## 2025-07-22 PROCEDURE — 87389 HIV-1 AG W/HIV-1&-2 AB AG IA: CPT

## 2025-07-22 PROCEDURE — 80074 ACUTE HEPATITIS PANEL: CPT

## 2025-07-22 PROCEDURE — 3075F SYST BP GE 130 - 139MM HG: CPT | Mod: CPTII,,, | Performed by: STUDENT IN AN ORGANIZED HEALTH CARE EDUCATION/TRAINING PROGRAM

## 2025-07-22 PROCEDURE — 99214 OFFICE O/P EST MOD 30 MIN: CPT | Mod: S$PBB,,, | Performed by: STUDENT IN AN ORGANIZED HEALTH CARE EDUCATION/TRAINING PROGRAM

## 2025-07-22 PROCEDURE — 3080F DIAST BP >= 90 MM HG: CPT | Mod: CPTII,,, | Performed by: STUDENT IN AN ORGANIZED HEALTH CARE EDUCATION/TRAINING PROGRAM

## 2025-07-22 PROCEDURE — 1159F MED LIST DOCD IN RCRD: CPT | Mod: CPTII,,, | Performed by: STUDENT IN AN ORGANIZED HEALTH CARE EDUCATION/TRAINING PROGRAM

## 2025-07-22 PROCEDURE — 86593 SYPHILIS TEST NON-TREP QUANT: CPT

## 2025-07-22 NOTE — PROGRESS NOTES
Gynecology    SUBJECTIVE:     Chief Complaint: STD CHECK       History of Present Illness:  Macie Guzman is a 34 yo  here today with complaints of abnormal vaginal discharge. She reports having a thick yellow discharge for the past two weeks. She received antibiotics for possible UTI, but states this has not improved symptoms.  She is not currently sexually active. She was last sexually active in May. Uses tubal ligation for contraception.  She reports regular menstrual cycles but since tubal ligation in  she has had heavy menses lasting 14 days.    Review of Systems:  Review of Systems   Constitutional:  Negative for chills and fever.   Respiratory:  Negative for shortness of breath.    Cardiovascular:  Negative for chest pain.   Gastrointestinal:  Negative for abdominal pain, nausea and vomiting.   Endocrine: Negative for hot flashes.   Genitourinary:  Positive for menorrhagia and vaginal discharge. Negative for menstrual problem.   Integumentary:  Negative for breast mass, nipple discharge and breast skin changes.   Neurological:  Negative for headaches.   Hematological:  Does not bruise/bleed easily.   Psychiatric/Behavioral:  Negative for depression.    Breast: Negative for mass, mastodynia, nipple discharge and skin changes       OBJECTIVE:     Physical Exam:  Physical Exam  Constitutional:       Appearance: Normal appearance.   HENT:      Head: Normocephalic and atraumatic.   Eyes:      Conjunctiva/sclera: Conjunctivae normal.      Pupils: Pupils are equal, round, and reactive to light.   Cardiovascular:      Rate and Rhythm: Normal rate and regular rhythm.   Pulmonary:      Effort: Pulmonary effort is normal. No respiratory distress.   Abdominal:      General: Abdomen is flat. There is no distension.      Palpations: Abdomen is soft.      Tenderness: There is no abdominal tenderness.   Genitourinary:     General: Normal vulva.      Vagina: Vaginal discharge (moderate yellow discharge) present.  No tenderness or bleeding.      Cervix: No cervical motion tenderness or friability.   Musculoskeletal:         General: Normal range of motion.      Cervical back: Normal range of motion.   Neurological:      Mental Status: She is alert.   Psychiatric:         Mood and Affect: Mood normal.         Thought Content: Thought content normal.         ASSESSMENT:       ICD-10-CM ICD-9-CM    1. Vaginal discharge  N89.8 623.5 HIV 1/2 Ag/Ab (4th Gen)      Hepatitis Panel, Acute      Treponema Pallidium Antibodies IgG, IgM      C. trachomatis/N. gonorrhoeae by AMP DNA      Vaginosis Screen by DNA Probe      2. Menorrhagia with regular cycle  N92.0 626.2 Device Authorization Order             Plan:      Macie was seen today for std check.    Diagnoses and all orders for this visit:    Vaginal discharge  -     HIV 1/2 Ag/Ab (4th Gen); Future  -     Hepatitis Panel, Acute; Future  -     Treponema Pallidium Antibodies IgG, IgM; Future  -     C. trachomatis/N. gonorrhoeae by AMP DNA  -     Vaginosis Screen by DNA Probe    Menorrhagia with regular cycle  -     Device Authorization Order        Orders Placed This Encounter   Procedures    HIV 1/2 Ag/Ab (4th Gen)    Hepatitis Panel, Acute    Treponema Pallidium Antibodies IgG, IgM    C. trachomatis/N. gonorrhoeae by AMP DNA    Vaginosis Screen by DNA Probe    Device Authorization Order       Follow up for IUD placement.    Ariela Sharma

## 2025-07-29 DIAGNOSIS — A59.09 TRICHOMONAL CERVICITIS: Primary | ICD-10-CM

## 2025-07-29 RX ORDER — METRONIDAZOLE 500 MG/1
500 TABLET ORAL EVERY 12 HOURS
Qty: 14 TABLET | Refills: 0 | Status: SHIPPED | OUTPATIENT
Start: 2025-07-29 | End: 2025-08-05

## 2025-08-07 ENCOUNTER — PATIENT MESSAGE (OUTPATIENT)
Dept: PRIMARY CARE CLINIC | Facility: CLINIC | Age: 35
End: 2025-08-07
Payer: MEDICAID

## 2025-08-14 ENCOUNTER — PROCEDURE VISIT (OUTPATIENT)
Dept: OBSTETRICS AND GYNECOLOGY | Facility: CLINIC | Age: 35
End: 2025-08-14
Payer: MEDICAID

## 2025-08-14 VITALS — BODY MASS INDEX: 44.2 KG/M2 | WEIGHT: 234.13 LBS | HEIGHT: 61 IN

## 2025-08-14 DIAGNOSIS — Z30.430 ENCOUNTER FOR INSERTION OF MIRENA IUD: Primary | ICD-10-CM

## 2025-08-14 DIAGNOSIS — N92.0 MENORRHAGIA WITH REGULAR CYCLE: ICD-10-CM

## 2025-08-14 LAB
B-HCG UR QL: NEGATIVE
CTP QC/QA: YES

## 2025-08-14 PROCEDURE — 99499 UNLISTED E&M SERVICE: CPT | Mod: S$PBB,,, | Performed by: STUDENT IN AN ORGANIZED HEALTH CARE EDUCATION/TRAINING PROGRAM

## 2025-08-14 PROCEDURE — 99999PBSHW PR PBB SHADOW TECHNICAL ONLY FILED TO HB: Mod: PBBFAC,,,

## 2025-08-14 PROCEDURE — 58300 INSERT INTRAUTERINE DEVICE: CPT | Mod: PBBFAC | Performed by: STUDENT IN AN ORGANIZED HEALTH CARE EDUCATION/TRAINING PROGRAM

## 2025-08-14 PROCEDURE — 58300 INSERT INTRAUTERINE DEVICE: CPT | Mod: S$PBB,,, | Performed by: STUDENT IN AN ORGANIZED HEALTH CARE EDUCATION/TRAINING PROGRAM

## 2025-08-14 PROCEDURE — 99999PBSHW POCT URINE PREGNANCY: Mod: PBBFAC,,,

## 2025-08-14 PROCEDURE — 81025 URINE PREGNANCY TEST: CPT | Mod: PBBFAC | Performed by: STUDENT IN AN ORGANIZED HEALTH CARE EDUCATION/TRAINING PROGRAM

## 2025-08-14 RX ADMIN — LEVONORGESTREL 1 INTRA UTERINE DEVICE: 52 INTRAUTERINE DEVICE INTRAUTERINE at 01:08
